# Patient Record
Sex: FEMALE | Race: BLACK OR AFRICAN AMERICAN | Employment: OTHER | URBAN - METROPOLITAN AREA
[De-identification: names, ages, dates, MRNs, and addresses within clinical notes are randomized per-mention and may not be internally consistent; named-entity substitution may affect disease eponyms.]

---

## 2019-04-19 ENCOUNTER — APPOINTMENT (OUTPATIENT)
Dept: GENERAL RADIOLOGY | Age: 69
DRG: 385 | End: 2019-04-19
Payer: MEDICARE

## 2019-04-19 ENCOUNTER — HOSPITAL ENCOUNTER (INPATIENT)
Age: 69
LOS: 6 days | Discharge: HOME | DRG: 385 | End: 2019-04-25
Attending: EMERGENCY MEDICINE | Admitting: FAMILY MEDICINE
Payer: MEDICARE

## 2019-04-19 ENCOUNTER — APPOINTMENT (OUTPATIENT)
Dept: CT IMAGING | Facility: CLINIC | Age: 69
DRG: 385 | End: 2019-04-19
Payer: MEDICARE

## 2019-04-19 DIAGNOSIS — R10.84 GENERALIZED ABDOMINAL PAIN: ICD-10-CM

## 2019-04-19 DIAGNOSIS — K56.609 SBO (SMALL BOWEL OBSTRUCTION) (HCC): Primary | ICD-10-CM

## 2019-04-19 LAB
-: ABNORMAL
-: NORMAL
ABSOLUTE EOS #: 0.4 K/UL (ref 0–0.4)
ABSOLUTE IMMATURE GRANULOCYTE: ABNORMAL K/UL (ref 0–0.3)
ABSOLUTE LYMPH #: 1.7 K/UL (ref 1–4.8)
ABSOLUTE MONO #: 0.5 K/UL (ref 0.1–1.2)
ALBUMIN SERPL-MCNC: 3.5 G/DL (ref 3.5–5.2)
ALBUMIN SERPL-MCNC: 3.9 G/DL (ref 3.5–5.2)
ALBUMIN/GLOBULIN RATIO: 0.9 (ref 1–2.5)
ALBUMIN/GLOBULIN RATIO: ABNORMAL (ref 1–2.5)
ALP BLD-CCNC: 150 U/L (ref 35–104)
ALP BLD-CCNC: 159 U/L (ref 35–104)
ALT SERPL-CCNC: 10 U/L (ref 5–33)
ALT SERPL-CCNC: 9 U/L (ref 5–33)
AMORPHOUS: ABNORMAL
ANION GAP SERPL CALCULATED.3IONS-SCNC: 11 MMOL/L (ref 9–17)
AST SERPL-CCNC: 13 U/L
AST SERPL-CCNC: 15 U/L
BACTERIA: ABNORMAL
BASOPHILS # BLD: 1 % (ref 0–2)
BASOPHILS ABSOLUTE: 0.1 K/UL (ref 0–0.2)
BILIRUB SERPL-MCNC: 0.2 MG/DL (ref 0.3–1.2)
BILIRUB SERPL-MCNC: 0.22 MG/DL (ref 0.3–1.2)
BILIRUBIN DIRECT: <0.08 MG/DL
BILIRUBIN URINE: NEGATIVE
BILIRUBIN, INDIRECT: ABNORMAL MG/DL (ref 0–1)
BUN BLDV-MCNC: 38 MG/DL (ref 8–23)
BUN/CREAT BLD: ABNORMAL (ref 9–20)
CALCIUM SERPL-MCNC: 10.1 MG/DL (ref 8.6–10.4)
CASTS UA: ABNORMAL /LPF (ref 0–2)
CHLORIDE BLD-SCNC: 103 MMOL/L (ref 98–107)
CO2: 21 MMOL/L (ref 20–31)
COLOR: YELLOW
COMMENT UA: ABNORMAL
CREAT SERPL-MCNC: 2.1 MG/DL (ref 0.5–0.9)
CRYSTALS, UA: ABNORMAL /HPF
DIFFERENTIAL TYPE: ABNORMAL
EOSINOPHILS RELATIVE PERCENT: 6 % (ref 1–4)
EPITHELIAL CELLS UA: ABNORMAL /HPF (ref 0–5)
GFR AFRICAN AMERICAN: 28 ML/MIN
GFR NON-AFRICAN AMERICAN: 23 ML/MIN
GFR SERPL CREATININE-BSD FRML MDRD: ABNORMAL ML/MIN/{1.73_M2}
GFR SERPL CREATININE-BSD FRML MDRD: ABNORMAL ML/MIN/{1.73_M2}
GLOBULIN: ABNORMAL G/DL (ref 1.5–3.8)
GLUCOSE BLD-MCNC: 172 MG/DL (ref 70–99)
GLUCOSE URINE: NEGATIVE
HCT VFR BLD CALC: 35.4 % (ref 36–46)
HEMOGLOBIN: 11 G/DL (ref 12–16)
IMMATURE GRANULOCYTES: ABNORMAL %
KETONES, URINE: NEGATIVE
LACTIC ACID: 0.6 MMOL/L (ref 0.5–2.2)
LEUKOCYTE ESTERASE, URINE: ABNORMAL
LIPASE: 9 U/L (ref 13–60)
LYMPHOCYTES # BLD: 26 % (ref 24–44)
MCH RBC QN AUTO: 22.5 PG (ref 26–34)
MCHC RBC AUTO-ENTMCNC: 31 G/DL (ref 31–37)
MCV RBC AUTO: 72.5 FL (ref 80–100)
MONOCYTES # BLD: 8 % (ref 2–11)
MUCUS: ABNORMAL
MYOGLOBIN: 68 NG/ML (ref 25–58)
NITRITE, URINE: NEGATIVE
NRBC AUTOMATED: ABNORMAL PER 100 WBC
OTHER OBSERVATIONS UA: ABNORMAL
PDW BLD-RTO: 15.7 % (ref 12.5–15.4)
PH UA: 5 (ref 5–8)
PLATELET # BLD: 182 K/UL (ref 140–450)
PLATELET ESTIMATE: ABNORMAL
PMV BLD AUTO: 10.5 FL (ref 6–12)
POTASSIUM SERPL-SCNC: 4.9 MMOL/L (ref 3.7–5.3)
PROTEIN UA: ABNORMAL
RBC # BLD: 4.88 M/UL (ref 4–5.2)
RBC # BLD: ABNORMAL 10*6/UL
RBC UA: ABNORMAL /HPF (ref 0–2)
REASON FOR REJECTION: NORMAL
RENAL EPITHELIAL, UA: ABNORMAL /HPF
SEG NEUTROPHILS: 59 % (ref 36–66)
SEGMENTED NEUTROPHILS ABSOLUTE COUNT: 3.9 K/UL (ref 1.8–7.7)
SODIUM BLD-SCNC: 135 MMOL/L (ref 135–144)
SPECIFIC GRAVITY UA: 1.02 (ref 1–1.03)
TOTAL PROTEIN: 7.7 G/DL (ref 6.4–8.3)
TOTAL PROTEIN: 8.1 G/DL (ref 6.4–8.3)
TRICHOMONAS: ABNORMAL
TROPONIN INTERP: ABNORMAL
TROPONIN T: ABNORMAL NG/ML
TROPONIN, HIGH SENSITIVITY: 10 NG/L (ref 0–14)
TURBIDITY: CLEAR
URINE HGB: NEGATIVE
UROBILINOGEN, URINE: NORMAL
WBC # BLD: 6.6 K/UL (ref 3.5–11)
WBC # BLD: ABNORMAL 10*3/UL
WBC UA: ABNORMAL /HPF (ref 0–5)
YEAST: ABNORMAL
ZZ NTE CLEAN UP: ORDERED TEST: NORMAL
ZZ NTE WITH NAME CLEAN UP: SPECIMEN SOURCE: NORMAL

## 2019-04-19 PROCEDURE — 36415 COLL VENOUS BLD VENIPUNCTURE: CPT

## 2019-04-19 PROCEDURE — 80053 COMPREHEN METABOLIC PANEL: CPT

## 2019-04-19 PROCEDURE — 83874 ASSAY OF MYOGLOBIN: CPT

## 2019-04-19 PROCEDURE — 6360000002 HC RX W HCPCS: Performed by: EMERGENCY MEDICINE

## 2019-04-19 PROCEDURE — 83690 ASSAY OF LIPASE: CPT

## 2019-04-19 PROCEDURE — 96374 THER/PROPH/DIAG INJ IV PUSH: CPT

## 2019-04-19 PROCEDURE — 74176 CT ABD & PELVIS W/O CONTRAST: CPT

## 2019-04-19 PROCEDURE — 93005 ELECTROCARDIOGRAM TRACING: CPT

## 2019-04-19 PROCEDURE — 1200000000 HC SEMI PRIVATE

## 2019-04-19 PROCEDURE — 99285 EMERGENCY DEPT VISIT HI MDM: CPT

## 2019-04-19 PROCEDURE — 83605 ASSAY OF LACTIC ACID: CPT

## 2019-04-19 PROCEDURE — 74018 RADEX ABDOMEN 1 VIEW: CPT

## 2019-04-19 PROCEDURE — 2580000003 HC RX 258: Performed by: FAMILY MEDICINE

## 2019-04-19 PROCEDURE — 81001 URINALYSIS AUTO W/SCOPE: CPT

## 2019-04-19 PROCEDURE — 96375 TX/PRO/DX INJ NEW DRUG ADDON: CPT

## 2019-04-19 PROCEDURE — 84484 ASSAY OF TROPONIN QUANT: CPT

## 2019-04-19 PROCEDURE — 85025 COMPLETE CBC W/AUTO DIFF WBC: CPT

## 2019-04-19 PROCEDURE — 80076 HEPATIC FUNCTION PANEL: CPT

## 2019-04-19 PROCEDURE — 6360000002 HC RX W HCPCS: Performed by: FAMILY MEDICINE

## 2019-04-19 PROCEDURE — 96376 TX/PRO/DX INJ SAME DRUG ADON: CPT

## 2019-04-19 RX ORDER — FENTANYL CITRATE 50 UG/ML
25 INJECTION, SOLUTION INTRAMUSCULAR; INTRAVENOUS ONCE
Status: COMPLETED | OUTPATIENT
Start: 2019-04-19 | End: 2019-04-19

## 2019-04-19 RX ORDER — HEPARIN SODIUM 5000 [USP'U]/ML
5000 INJECTION, SOLUTION INTRAVENOUS; SUBCUTANEOUS 2 TIMES DAILY
Status: DISCONTINUED | OUTPATIENT
Start: 2019-04-20 | End: 2019-04-26 | Stop reason: HOSPADM

## 2019-04-19 RX ORDER — TACROLIMUS 1 MG/1
2 CAPSULE ORAL 2 TIMES DAILY
Status: ON HOLD | COMMUNITY
End: 2019-04-25 | Stop reason: HOSPADM

## 2019-04-19 RX ORDER — CONJUGATED ESTROGENS 0.62 MG/G
0.5 CREAM VAGINAL PRN
COMMUNITY

## 2019-04-19 RX ORDER — DOCUSATE SODIUM 100 MG/1
100 CAPSULE, LIQUID FILLED ORAL PRN
Status: ON HOLD | COMMUNITY
End: 2019-08-01 | Stop reason: ALTCHOICE

## 2019-04-19 RX ORDER — ONDANSETRON 2 MG/ML
4 INJECTION INTRAMUSCULAR; INTRAVENOUS ONCE
Status: DISCONTINUED | OUTPATIENT
Start: 2019-04-19 | End: 2019-04-26 | Stop reason: HOSPADM

## 2019-04-19 RX ORDER — NICOTINE 21 MG/24HR
1 PATCH, TRANSDERMAL 24 HOURS TRANSDERMAL DAILY PRN
Status: DISCONTINUED | OUTPATIENT
Start: 2019-04-19 | End: 2019-04-19

## 2019-04-19 RX ORDER — ACETAMINOPHEN 500 MG
1000 TABLET ORAL NIGHTLY PRN
Status: ON HOLD | COMMUNITY
End: 2019-04-25 | Stop reason: HOSPADM

## 2019-04-19 RX ORDER — ASPIRIN 81 MG/1
81 TABLET, CHEWABLE ORAL DAILY
Status: ON HOLD | COMMUNITY
End: 2019-04-25 | Stop reason: HOSPADM

## 2019-04-19 RX ORDER — ONDANSETRON 2 MG/ML
4 INJECTION INTRAMUSCULAR; INTRAVENOUS ONCE
Status: COMPLETED | OUTPATIENT
Start: 2019-04-19 | End: 2019-04-19

## 2019-04-19 RX ORDER — MAGNESIUM SULFATE 1 G/100ML
1 INJECTION INTRAVENOUS PRN
Status: DISCONTINUED | OUTPATIENT
Start: 2019-04-19 | End: 2019-04-19

## 2019-04-19 RX ORDER — SODIUM CHLORIDE 0.9 % (FLUSH) 0.9 %
10 SYRINGE (ML) INJECTION PRN
Status: DISCONTINUED | OUTPATIENT
Start: 2019-04-19 | End: 2019-04-26 | Stop reason: HOSPADM

## 2019-04-19 RX ORDER — SODIUM CHLORIDE 0.9 % (FLUSH) 0.9 %
10 SYRINGE (ML) INJECTION EVERY 12 HOURS SCHEDULED
Status: DISCONTINUED | OUTPATIENT
Start: 2019-04-19 | End: 2019-04-26 | Stop reason: HOSPADM

## 2019-04-19 RX ORDER — ONDANSETRON 2 MG/ML
4 INJECTION INTRAMUSCULAR; INTRAVENOUS EVERY 6 HOURS PRN
Status: DISCONTINUED | OUTPATIENT
Start: 2019-04-19 | End: 2019-04-19

## 2019-04-19 RX ORDER — METOPROLOL SUCCINATE 25 MG/1
25 TABLET, EXTENDED RELEASE ORAL NIGHTLY
COMMUNITY
Start: 2018-08-30

## 2019-04-19 RX ORDER — URSODIOL 300 MG/1
600 CAPSULE ORAL 2 TIMES DAILY
COMMUNITY

## 2019-04-19 RX ORDER — ACETAMINOPHEN 325 MG/1
650 TABLET ORAL EVERY 4 HOURS PRN
Status: DISCONTINUED | OUTPATIENT
Start: 2019-04-19 | End: 2019-04-26 | Stop reason: HOSPADM

## 2019-04-19 RX ORDER — TACROLIMUS 1 MG/1
1 CAPSULE ORAL 2 TIMES DAILY
Status: DISCONTINUED | OUTPATIENT
Start: 2019-04-19 | End: 2019-04-22

## 2019-04-19 RX ORDER — SODIUM CHLORIDE 9 MG/ML
INJECTION, SOLUTION INTRAVENOUS CONTINUOUS
Status: DISCONTINUED | OUTPATIENT
Start: 2019-04-19 | End: 2019-04-21

## 2019-04-19 RX ORDER — ONDANSETRON 4 MG/1
4 TABLET, ORALLY DISINTEGRATING ORAL EVERY 6 HOURS PRN
Status: DISCONTINUED | OUTPATIENT
Start: 2019-04-19 | End: 2019-04-26 | Stop reason: HOSPADM

## 2019-04-19 RX ORDER — FERROUS SULFATE 325(65) MG
325 TABLET ORAL 2 TIMES DAILY
Status: ON HOLD | COMMUNITY
End: 2019-08-01 | Stop reason: ALTCHOICE

## 2019-04-19 RX ORDER — ONDANSETRON 2 MG/ML
4 INJECTION INTRAMUSCULAR; INTRAVENOUS EVERY 6 HOURS PRN
Status: DISCONTINUED | OUTPATIENT
Start: 2019-04-19 | End: 2019-04-26 | Stop reason: HOSPADM

## 2019-04-19 RX ADMIN — FENTANYL CITRATE 25 MCG: 50 INJECTION, SOLUTION INTRAMUSCULAR; INTRAVENOUS at 08:36

## 2019-04-19 RX ADMIN — ONDANSETRON 4 MG: 2 INJECTION INTRAMUSCULAR; INTRAVENOUS at 06:36

## 2019-04-19 RX ADMIN — SODIUM CHLORIDE: 9 INJECTION, SOLUTION INTRAVENOUS at 19:19

## 2019-04-19 RX ADMIN — FENTANYL CITRATE 25 MCG: 50 INJECTION, SOLUTION INTRAMUSCULAR; INTRAVENOUS at 06:36

## 2019-04-19 RX ADMIN — FENTANYL CITRATE 25 MCG: 50 INJECTION, SOLUTION INTRAMUSCULAR; INTRAVENOUS at 07:19

## 2019-04-19 RX ADMIN — TACROLIMUS 1 MG: 1 CAPSULE ORAL at 20:12

## 2019-04-19 RX ADMIN — SODIUM CHLORIDE: 9 INJECTION, SOLUTION INTRAVENOUS at 12:26

## 2019-04-19 RX ADMIN — HYDROMORPHONE HYDROCHLORIDE 0.5 MG: 1 INJECTION, SOLUTION INTRAMUSCULAR; INTRAVENOUS; SUBCUTANEOUS at 12:26

## 2019-04-19 RX ADMIN — HYDROMORPHONE HYDROCHLORIDE 0.5 MG: 1 INJECTION, SOLUTION INTRAMUSCULAR; INTRAVENOUS; SUBCUTANEOUS at 17:54

## 2019-04-19 RX ADMIN — ONDANSETRON 4 MG: 2 INJECTION INTRAMUSCULAR; INTRAVENOUS at 15:57

## 2019-04-19 SDOH — HEALTH STABILITY: MENTAL HEALTH: HOW OFTEN DO YOU HAVE A DRINK CONTAINING ALCOHOL?: NEVER

## 2019-04-19 ASSESSMENT — PAIN DESCRIPTION - PAIN TYPE
TYPE: ACUTE PAIN

## 2019-04-19 ASSESSMENT — PAIN DESCRIPTION - FREQUENCY
FREQUENCY: INTERMITTENT
FREQUENCY: INTERMITTENT
FREQUENCY: CONTINUOUS
FREQUENCY: CONTINUOUS

## 2019-04-19 ASSESSMENT — PAIN SCALES - GENERAL
PAINLEVEL_OUTOF10: 10
PAINLEVEL_OUTOF10: 6
PAINLEVEL_OUTOF10: 10
PAINLEVEL_OUTOF10: 6
PAINLEVEL_OUTOF10: 8
PAINLEVEL_OUTOF10: 8
PAINLEVEL_OUTOF10: 3
PAINLEVEL_OUTOF10: 8
PAINLEVEL_OUTOF10: 10

## 2019-04-19 ASSESSMENT — PAIN DESCRIPTION - PROGRESSION
CLINICAL_PROGRESSION: NOT CHANGED
CLINICAL_PROGRESSION: RAPIDLY IMPROVING
CLINICAL_PROGRESSION: GRADUALLY IMPROVING
CLINICAL_PROGRESSION: NOT CHANGED
CLINICAL_PROGRESSION: NOT CHANGED

## 2019-04-19 ASSESSMENT — PAIN DESCRIPTION - ORIENTATION
ORIENTATION: LEFT;LOWER

## 2019-04-19 ASSESSMENT — PAIN - FUNCTIONAL ASSESSMENT
PAIN_FUNCTIONAL_ASSESSMENT: ACTIVITIES ARE NOT PREVENTED

## 2019-04-19 ASSESSMENT — PAIN DESCRIPTION - ONSET
ONSET: ON-GOING

## 2019-04-19 ASSESSMENT — PAIN DESCRIPTION - LOCATION
LOCATION: ABDOMEN

## 2019-04-19 ASSESSMENT — PAIN DESCRIPTION - DESCRIPTORS
DESCRIPTORS: CRAMPING
DESCRIPTORS: TIGHTNESS;CRAMPING
DESCRIPTORS: CRAMPING
DESCRIPTORS: CRAMPING;TIGHTNESS

## 2019-04-19 NOTE — PROGRESS NOTES
Pt admitted to room 2011 from [address] . Oriented to room, call light and bed mechanics. Side rails up x2. Call light within reach. Dr. Andres Blake to be contacted for further orders.

## 2019-04-19 NOTE — CARE COORDINATION
Case Management Initial Discharge Plan  Mercedes [place],         Readmission Risk              Risk of Unplanned Readmission:        12             Met with:patient to discuss discharge plans. Information verified: address, contacts, phone number, , insurance Yes  PCP: Luis Dan MD  Date of last visit: 2019    Insurance Provider: Medicare/ JOURDAN    Discharge Planning  Current Residence:  Private home  Living Arrangements:  Spouse/Significant Other, Family Members(three grandchildren )   Home has 2 stories/bedroom and bathroom on second floor and half bath on first floor. 1 outside step  Support Systems:  Family Members  Current Services PTA:  none Agency: none  Patient able to perform ADL's:Independent  DME in home:  Benja Vladislav, cane  DME used to aid ambulation prior to admission:   Occasional use of cane  DME used during admission:  TBD    Potential Assistance Needed:  N/A    Pharmacy: [facility] on [address] Medications:  No  Does patient want to participate in local refill/ meds to beds program?  No    Patient agreeable to home care: No  [place] of choice provided:  n/a      Type of Home Care Services:  None  Patient expects to be discharged to:  home     Prior SNF/Rehab Placement and Facility: none  Agreeable to SNF/Rehab: No  [place] of choice provided: n/a   Evaluation: n/a    Expected Discharge date:  19  Follow Up Appointment: Best Day/ Time: Monday AM    Transportation provider:   Transportation arrangements needed for discharge: No    Discharge Plan:   Met with patient to review plan of care. Pt lives with her  and 3 grand children ages 10, 6 and 15; they grandparents have custody of the grandchildren. Pt has history of ulcerative colitis and her GI DrDeann Retired and she needs recommendation for a new GI and would appreciate help setting up an appt. Pt hopes to go home with no services.     Plan  F/U with surgical consult and

## 2019-04-19 NOTE — H&P
Matty Chen MD, [facility]  History and Physical    Patient:  Carol Tran  MRN: [id number]    CHIEF COMPLAINT:  ' I had severe pain in my lower abdomen '    History Obtained From:  patient, non-family caregiver - ER physician, nursing, electronic medical record  PCP: Nazario Reza MD    HISTORY OF PRESENT ILLNESS:   The patient is a 77 y.o. female who presents with progressive lower abdominal pain for the last day. Pt says became severe and unbearable for her. Pt says pain was initially cramping but later became severe. C/o vomiting since last night about 5 to 6 times. C/o nausea that has been ongoing    Pt says she has had diarrhea for the last 3 weeks or so. Says was having 3-4 BMs most days. She was havig small amounts of BMs each time. There was cramping associated w that in addition. Pt says in addition she had fresh blood at times. Pt says she had been started on iron in the past and noted her BMs to be dark and black. Pt has undergone transplant of her liver. 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 Says she needed a liver transplant again in 2011. CA liver was found at that time and she underwent chemo for 8 months after that. Pt denies any fever, rash, joint swelling, orthopnea or dyspnea recently    Other ROS neg      Past Medical History:        Diagnosis Date    Liver transplant recipient [facility]) 2004 and 2010    x2     [facility]     Tachycardia     Ulcerative colitis ([facility]        Past Surgical History:        Procedure Laterality Date    CHOLECYSTECTOMY  1987    HERNIA REPAIR      Hiatal and Incisional     LIVER TRANSPLANT  2004,2010    x2    TOTAL KNEE ARTHROPLASTY Left 2018       Medications Prior to Admission:    Prior to Admission medications    Medication Sig Start Date End Date Taking?  Authorizing Provider   aspirin 81 MG chewable tablet Take 81 mg by mouth daily   Yes Historical Provider, MD   docusate sodium (COLACE) 100 MG capsule Take 100 mg by mouth as needed   Yes Historical Provider, MD   ursodiol (ACTIGALL) 300 MG capsule Take 600 mg by mouth 2 times daily   Yes Historical Provider, MD   metoprolol succinate (TOPROL XL) 25 MG extended release tablet Take 25 mg by mouth daily 8/30/18  Yes Historical Provider, MD   tacrolimus (PROGRAF) 1 MG capsule Take 2 mg by mouth 2 times daily    Yes Historical Provider, MD   conjugated estrogens (PREMARIN) 0.625 MG/GM vaginal cream Place 0.5 g vaginally as needed   Yes Historical Provider, MD   acetaminophen (TYLENOL) 500 MG tablet Take 500 mg by mouth as needed   Yes Historical Provider, MD   Triprolidine-Pseudoephedrine (ACTANOL PO) Take 600 mg by mouth 2 times daily   Yes Historical Provider, MD   Cholecalciferol-Vitamin C (VITAMIN D3-VITAMIN C) 1000-500 UNIT-MG CAPS Take 1 tablet by mouth 2 times daily   Yes Historical Provider, MD   ferrous sulfate 325 (65 Fe) MG tablet Take 325 mg by mouth daily (with breakfast)   Yes Historical Provider, MD   Multiple Vitamins-Iron (ONE DAILY MULTIVITAMIN/IRON PO) Take 1 tablet by mouth daily   Yes Historical Provider, MD       Allergies:  Codeine; Compazine [prochlorperazine]; Morphine; and Moxifloxacin    Social History:   TOBACCO:   reports that she has never smoked. She has never used smokeless tobacco.  ETOH:   reports that she does not drink alcohol. OCCUPATION:  Retired from [address] [place] [address] [employer] History:   History reviewed. No pertinent family history. REVIEW OF SYSTEMS:  Negative except for as above. Physical Exam:    Vitals: /86   Pulse 91   Temp 97.3 °F (36.3 °C) (Oral)   Resp 16   Ht 5' 7\" (1.702 m)   Wt 183 lb (83 kg)   SpO2 98%   BMI 28.66 kg/m²   General appearance: alert, appears stated age and cooperative. NG tube  Skin: Skin color, texture, turgor normal. No rashes or lesions  HEENT: Head: Normocephalic, no lesions, without obvious abnormality.   Neck: no adenopathy, no carotid bruit, no JVD, supple, symmetrical, trachea midline and thyroid not tube tip and side hole terminate in the body of the stomach. CT ABDOMEN PELVIS WO CONTRAST [[id number]] Collected: 04/19/19 0656   Updated: 04/19/19 1025    Narrative:     EXAMINATION:  CT OF THE ABDOMEN AND PELVIS WITHOUT CONTRAST 4/19/2019 6:38 am    TECHNIQUE:  CT of the abdomen and pelvis was performed without the administration of  intravenous contrast. Multiplanar reformatted images are provided for review. Dose modulation, iterative reconstruction, and/or weight based adjustment of  the mA/kV was utilized to reduce the radiation dose to as low as reasonably  achievable. COMPARISON:  None    HISTORY:  ORDERING SYSTEM PROVIDED HISTORY: ABDOMINAL PAIN  TECHNOLOGIST PROVIDED HISTORY:  Ordering Physician Provided Reason for Exam: Atypical Epigastric pain this  early morning  Acuity: Acute  Type of Exam: Initial    FINDINGS:  Lower Chest: Dependent lung changes. Organs: Evaluation of the solid organs is limited without intravenous  contrast. Postsurgical changes within the upper abdomen from history of liver  transplant.  No liver lesion.  Mild pneumobilia.  Cholecystectomy. Pancreatic atrophy.  No definite pancreatic lesion.  No splenomegaly.  No  adrenal lesion.  No hydronephrosis.  Bilateral renal atrophy.  No renal  calculus.  Multiple left renal cysts. GI/Bowel: Small bowel anastomosis.  Mildly dilated loop of small bowel  proximal to the anastomosis.  No acute inflammatory process. Pelvis: No free fluid.  No bladder calculus. Peritoneum/Retroperitoneum: Atherosclerotic calcification of the abdominal  aorta without aneurysmal dilatation.  No adenopathy. Bones/Soft Tissues: Postsurgical changes along the lower anterior abdominal  wall.  No acute abnormality.  Diffuse osteopenia.  Lumbar spine degenerative  changes. Impression:     Mildly dilated loop of small bowel proximal to the anastomosis.  Findings may  be on the basis of delayed transit versus mild partial small bowel  obstruction.  No high-grade obstruction. Microscopic Urinalysis [[id number]] (Abnormal) Collected: 04/19/19 0739   Updated: 04/19/19 0804     -         WBC, UA 2 TO 5 /HPF    RBC, UA 0 TO 2 /HPF    Casts UA NOT REPORTED /LPF    Crystals UA NOT REPORTED /HPF    Epithelial Cells UA 10 TO 20 /HPF    Renal Epithelial, Urine NOT REPORTED /HPF    Bacteria, UA FEWAbnormal     Mucus, UA NOT REPORTED    Trichomonas, UA NOT REPORTED    Amorphous, UA NOT REPORTED    Other Observations UA NOT REPORTED    Yeast, UA NOT REPORTED   Urinalysis [[id number]] (Abnormal) Collected: 04/19/19 0739   Updated: 04/19/19 0804    Specimen Type: Urine (20)     Color, UA YELLOW    Turbidity UA CLEAR    Glucose, Ur NEGATIVE    Bilirubin Urine NEGATIVE    Ketones, Urine NEGATIVE    Specific [place], UA 1.025    Urine Hgb NEGATIVE    pH, UA 5.0    Protein, UA 1+Abnormal     Urobilinogen, Urine Normal    Nitrite, Urine NEGATIVE    Leukocyte Esterase, Urine TRACEAbnormal     Urinalysis Comments NOT REPORTED   Comprehensive Metabolic Panel [[id number]] (Abnormal) Collected: 04/19/19 0710   Updated: 04/19/19 0731     Glucose 172High  mg/dL    BUN 38High  mg/dL    CREATININE 2. 10High  mg/dL    Bun/Cre Ratio NOT REPORTED    Calcium 10.1 mg/dL    Sodium 135 mmol/L    Potassium 4.9 mmol/L    Chloride 103 mmol/L    CO2 21 mmol/L    Anion Gap 11 mmol/L    Alkaline Phosphatase 159High  U/L    ALT 9 U/L    AST 13 U/L    Total Bilirubin 0.20Low  mg/dL    Total Protein 8.1 g/dL    Alb 3.9 g/dL    Albumin/Globulin Ratio 0.9Low     GFR Non- 23Low  mL/min    GFR  28Low  mL/min    GFR Comment         Comment: Average GFR for 79or more years old:    65 mL/min/1.73sq m   Chronic Kidney Disease:    <60 mL/min/1.73sq m   Kidney failure:    <15 mL/min/1.73sq m               eGFR calculated using average adult body mass.  Additional eGFR calculator available at:         [email or web address]              GFR Staging NOT REPORTED   Lipase [[id number]] (Abnormal) Collected: 04/19/19 0710   Updated: 04/19/19 0731     Lipase 9Low  U/L   PREVIOUS SPECIMEN [[id number]] Collected: 04/19/19 0710   Updated: 04/19/19 0715    SPECIMEN REJECTION [[id number]] Collected: 04/19/19 0521   Updated: 04/19/19 0708     Specimen Source . BLOOD    Ordered Test CP LIP    Reason for Rejection Unable to perform testing: Specimen hemolyzed. - NOT REPORTED   TROP/MYOGLOBIN [[id number]] (Abnormal) Collected: 04/19/19 0621   Updated: 04/19/19 5920    Specimen Source: Blood     Troponin, High Sensitivity 10 ng/L    Comment:        High Sensitivity Troponin values cannot be compared with other Troponin methodologies.         Patients with high levels of Biotin oral intake (i.e >5mg/day) may have falsely decreased   Troponin levels. Samples collected within 8 hours of biotin intake may require additional   information for diagnosis. Troponin T NOT REPORTED ng/mL    Troponin Interp NOT REPORTED    Myoglobin 68High  ng/mL   CBC Auto Differential [[id number]] (Abnormal) Collected: 04/19/19 0621   Updated: 04/19/19 0645    Specimen Source: Blood     WBC 6.6 k/uL    RBC 4.88 m/uL    Hemoglobin 11.0Low  g/dL    Hematocrit 35.4Low  %    MCV 72.5Low  fL    MCH 22.5Low  pg    MCHC 31.0 g/dL    RDW 15.7High  %    Platelets 909 k/uL         Assessment and Plan   1. Ac SBO  2. Prior multiple abd surgeries  3. Likely adhesions  4. NG  5. NPO  6. IV fluids  7. Is/ Os  8. ?Ischemic colitis w recent diarrhea, hematochezia and now obstruction  9. Check Lactic acid  10. If has BM, will check FOBT, WBCs, Stool Cx  11. PPI   12. Surgery consulted already  13. H/o Liver transplant x2  14. On Tacrolimus only  15. Consulted pharmacy for IV dosing  [facility]. [facility]  17. Transplant dept recommends SQ half dose and recheck levels  18. Incentive Spirometry  19. HTN  20. Pain control  21. Dilaudid prn  22. Anti emetics prn  23. Heparin  24. CKD3  25.  Consult Nephrology also  26. ?GI consult  27.  Cont close monitoring    Patient Active Problem List   Diagnosis Code    [facility] K56.609       Electronically signed by Natacha Sanford MD on 4/19/2019 at 2:47 PM

## 2019-04-19 NOTE — CONSULTS
General Surgery Consult      Pt Name: Jennifer Means  MRN: [id number]  [place]: 1950  Date of evaluation: 4/19/2019  Primary Care Physician: Camryn Houston MD   Patient evaluated at the request of  Dr. Jen Randall  Reason for evaluation: Abdominal pain. Possible small bowel obstruction    SUBJECTIVE:   History of Chief Complaint:    Jennifer Means is a 77 y.o. female who presents with left-sided abdominal pain for the past couple days. The patient has a long-standing history of ulcerative colitis. She was followed by Dr. Cris Garcia and had a colonoscopy about 2 years ago. Her history is also significant for liver transplant back in 2004 for ascending cholangitis. The transplanted liver was found to have hepatocellular carcinoma for which the patient underwent chemotherapy and had subsequent re-transplant back in 2010. The patient has had multiple hernia surgeries. He denies having colon surgery. She complains of pain but denies nausea or vomiting    Past Medical History   has a past medical history of Liver transplant recipient [facility]), Sickle cell trait ([facility] Tachycardia, and Ulcerative colitis ([facility] Past Surgical History   has a past surgical history that includes Liver transplant ([id number]); hernia repair; Cholecystectomy (1987); and Total knee arthroplasty (Left, 2018). Medications  Prior to Admission medications    Medication Sig Start Date End Date Taking?  Authorizing Provider   aspirin 81 MG chewable tablet Take 81 mg by mouth daily   Yes Historical Provider, MD   docusate sodium (COLACE) 100 MG capsule Take 100 mg by mouth as needed   Yes Historical Provider, MD   ursodiol (ACTIGALL) 300 MG capsule Take 600 mg by mouth 2 times daily   Yes Historical Provider, MD   metoprolol succinate (TOPROL XL) 25 MG extended release tablet Take 25 mg by mouth daily 8/30/18  Yes Historical Provider, MD   tacrolimus (PROGRAF) 1 MG capsule Take 2 mg by mouth 2 times daily    Yes Historical Provider, MD inguinal nodes  HEENT: Head is normocephalic, atraumatic. EOMI, PERRLA  NECK: Supple, symmetrical, trachea midline, no adenopathy, thyroid symmetric, not enlarged and no tenderness, skin normal  CHEST/LUNGS: chest symmetric with normal A/P diameter, normal respiratory rate and rhythm, lungs clear to auscultation without wheezes, rales or rhonchi. No accessory muscle use. CARDIOVASCULAR: Heart regular rate and rhythm   ABDOMEN: Normal shape. Normal bowel sounds. No bruits. Soft, nondistended, no masses or organomegaly. no evidence of hernia. Percussion: Normal without hepatosplenomegally. Tenderness: Left abdomen, without rebound, rigidity, or guarding  RECTAL: Deferred at this time  NEUROLOGIC: There are no focalizing motor or sensory deficits. CN II-XII are grossly intact.   EXTREMITIES: no cyanosis, no clubbing and no edema    LABS:     CBC with Differential:    Lab Results   Component Value Date    WBC 6.6 04/19/2019    RBC 4.88 04/19/2019    HGB 11.0 04/19/2019    HCT 35.4 04/19/2019     04/19/2019    MCV 72.5 04/19/2019    MCH 22.5 04/19/2019    MCHC 31.0 04/19/2019    RDW 15.7 04/19/2019    LYMPHOPCT 26 04/19/2019    MONOPCT 8 04/19/2019    BASOPCT 1 04/19/2019    MONOSABS 0.50 04/19/2019    LYMPHSABS 1.70 04/19/2019    EOSABS 0.40 04/19/2019    BASOSABS 0.10 04/19/2019    DIFFTYPE NOT REPORTED 04/19/2019     CMP:    Lab Results   Component Value Date     04/19/2019    K 4.9 04/19/2019     04/19/2019    CO2 21 04/19/2019    BUN 38 04/19/2019    CREATININE 2.10 04/19/2019    GFRAA 28 04/19/2019    LABGLOM 23 04/19/2019    GLUCOSE 172 04/19/2019    PROT 7.7 04/19/2019    LABALBU 3.5 04/19/2019    CALCIUM 10.1 04/19/2019    BILITOT 0.22 04/19/2019    ALKPHOS 150 04/19/2019    AST 15 04/19/2019    ALT 10 04/19/2019     Magnesium:  No results found for: MG  Phosphorus:  No results found for: PHOS  PT/INR:  No results found for: PROTIME, INR  U/A:    Lab Results   Component Value Date COLORU YELLOW 04/19/2019    PROTEINU 1+ 04/19/2019    PHUR 5.0 04/19/2019    WBCUA 2 TO 5 04/19/2019    RBCUA 0 TO 2 04/19/2019    MUCUS NOT REPORTED 04/19/2019    TRICHOMONAS NOT REPORTED 04/19/2019    YEAST NOT REPORTED 04/19/2019    BACTERIA FEW 04/19/2019    SPECGRAV 1.025 04/19/2019    LEUKOCYTESUR TRACE 04/19/2019    UROBILINOGEN Normal 04/19/2019    BILIRUBINUR NEGATIVE 04/19/2019    GLUCOSEU NEGATIVE 04/19/2019    AMORPHOUS NOT REPORTED 04/19/2019     LIPASE:    Lab Results   Component Value Date    LIPASE 9 04/19/2019         RADIOLOGY:   I have personally reviewed the following films:  CT scan abd/pelvis:   Lower Chest: Dependent lung changes. Organs: Evaluation of the solid organs is limited without intravenous  contrast. Postsurgical changes within the upper abdomen from history of liver  transplant. No liver lesion. Mild pneumobilia. Cholecystectomy. Pancreatic atrophy. No definite pancreatic lesion. No splenomegaly. No  adrenal lesion. No hydronephrosis. Bilateral renal atrophy. No renal  calculus. Multiple left renal cysts. GI/Bowel: Small bowel anastomosis. Mildly dilated loop of small bowel  proximal to the anastomosis. No acute inflammatory process. Pelvis: No free fluid. No bladder calculus. Peritoneum/Retroperitoneum: Atherosclerotic calcification of the abdominal  aorta without aneurysmal dilatation. No adenopathy. Bones/Soft Tissues: Postsurgical changes along the lower anterior abdominal  wall. No acute abnormality. Diffuse osteopenia. Lumbar spine degenerative  changes. Impression:      Mildly dilated loop of small bowel proximal to the anastomosis. Findings may  be on the basis of delayed transit versus mild partial small bowel  obstruction. No high-grade obstruction. IMPRESSION:   1. Possible partial small bowel obstruction versus delayed emptying. 2. Doubt complete small bowel obstruction  3.  Patient's pain appears to be related more to flareup of ulcerative colitis    Principal Problem:    SBO (small bowel obstruction) ([facility])  Resolved Problems:    * No resolved hospital problems. *      PLAN:   1. Will follow closely  2. Consult GI regarding ulcerative colitis. The patient will need follow-up since her gastroenterologist has retired  3. Check a.m. KUB  4. Should the patient require surgical intervention I would recommend transfer to [facility] where the patient had her transplant as she would be a very high risk surgical candidate  5. Above was discussed at length with the patient  6. Further recommendations to follow      Thank you for this interesting consult and for allowing us to participate in the care of your patient. Please feel free to contact me with any questions or concerns.

## 2019-04-19 NOTE — PROGRESS NOTES
Call was placed to [facility] ([phone]) to discuss with patients transplant nurse/pharmacist regarding their recommendations for IV tacrolimus due to patient being NPO. Per Vivian Doty , Pharm D, transplantation pharmacist at [facility], they try to avoid IV tacrolimus due to an increased risk of Nephrotoxicity. There is data to support the use of SUBLINGUAL tacrolimus in this situation. Her recommendation was to give 50 % of patients usual PO dose. Patients most recent PO dose was Prograf 2 MG PO BID. Therefore, we will order PROGRAF 1 MG SUBLINGUAL BID. Further recommendations include that the nurses be protected during administration by using gown, mask and double gloves to reduced their exposure to the powder 3Er \[facility] [place] [facility]\""o [facility]. The powder should be administered under the tongue, the patient should allow at least 10  minutes for absorption by this route. Also recommended was a repeat tacrolimus () trough level with our goal being 8 ug/ml. Aron Moseley recommeds this level on Sunday if patient is still using the Prograf sublingual at that point. We will order this and follow patient with you.

## 2019-04-19 NOTE — FLOWSHEET NOTE
Patient sleeping and no family present. Writer left note and offered silent prayer. Patient did not respond.        04/19/19 1216   Encounter Summary   Services provided to: Patient   Referral/Consult From: Nasrin Yusuf Visiting   (4/19/19 Pt Sleeping)   Complexity of Encounter Low   Length of Encounter 15 minutes   Routine   Type Initial   Assessment Sleeping   Intervention Prayer;Sustaining presence/ Ministry of presence   Outcome Did not respond

## 2019-04-19 NOTE — ED NOTES
Pt states she began to experience left lower abdominal pain described as cramping since yesterday.  Pt also c/o nausea and diarrhea attributed to her ulcerative colitis       Jammie Cuellar RN  04/19/19 [address] Kolleen Brittle RN  04/19/19 1997

## 2019-04-19 NOTE — ED NOTES
Dr. Field Better returns page and speaks with dr Olivia Little regarding admission to [address], RN  04/19/19 3359

## 2019-04-19 NOTE — ED NOTES
Dr. Radha Gresham on phone with [facility] Access to bennett hospitalist at [address], RN  04/19/19 2164

## 2019-04-19 NOTE — PROGRESS NOTES
Spoke with Dr. Trini Bolden and updated physician on patients current status. Physician to enter orders. Patient updated.

## 2019-04-19 NOTE — ED PROVIDER NOTES
eMERGENCY dEPARTMENT eNCOUnter      Pt Name: Ester Jenkins  MRN: [id number]  [place] 1950  Date of evaluation: 4/19/2019      CHIEF COMPLAINT       Chief Complaint   Patient presents with    Abdominal Pain         HISTORY OF PRESENT ILLNESS    Ester Jenkins is a 77 y.o. female who presents with abdominal pain. Patient states she has been having left quadrant abdominal pain since yesterday. She has a history of ulcerative colitis and she states has been flaring up on her for last few days, usually means that she has some pain i, and then has to go to the bathroom right away. She states yesterday she has tried multiple times over the bathroom with his pain but has not helped. Rates the pain as severe in nature, associated with some nausea and vomiting. No chest pain or shortness of breath. No fever or chills. No exacerbating or relieving factors. She has a history of liver transplant first one, because of a sending: Cholangitis secondary because of underlying cancer to liver this when she has had for the last 9 years        REVIEW OF SYSTEMS       Review of systems are reviewed and negative except stated above in HPI     [address]    has a past medical history of Tachycardia. SURGICAL HISTORY      has a past surgical history that includes Liver transplant; hernia repair; knee surgery; and Cholecystectomy.     CURRENT MEDICATIONS       Previous Medications    ACETAMINOPHEN (TYLENOL) 500 MG TABLET    Take 500 mg by mouth as needed    AMINO ACIDS (DAILY AMINO 6000 PO)    Take 1 tablet by mouth    AMINO ACIDS (DAILY AMINO 6000 PO)    Take 3,000 mcg by mouth    AMINO ACIDS (DAILY AMINO 6000 PO)    Take 1 tablet by mouth    AMINO ACIDS (DAILY AMINO 6000 PO)    Take 20 mg by mouth daily    ASPIRIN 81 MG CHEWABLE TABLET    Take 81 mg by mouth daily    CONJUGATED ESTROGENS (PREMARIN) 0.625 MG/GM VAGINAL CREAM    Place 0.5 g vaginally as needed    DOCUSATE SODIUM (COLACE) 100 MG CAPSULE    Take 100 mg by mouth as needed    METOPROLOL SUCCINATE (TOPROL XL) 25 MG EXTENDED RELEASE TABLET    Take 25 mg by mouth daily    TACROLIMUS (PROGRAF) 1 MG CAPSULE    Take 4 mg by mouth 2 times daily    URSODIOL (ACTIGALL) 300 MG CAPSULE    Take 600 mg by mouth 2 times daily       ALLERGIES     is allergic to codeine; compazine [prochlorperazine]; morphine; and moxifloxacin. FAMILY HISTORY     has no family status information on file. family history is not on file. SOCIAL HISTORY      reports that she has never smoked. She has never used smokeless tobacco. She reports that she does not drink alcohol or use drugs. PHYSICAL EXAM     INITIAL VITALS:  height is 5' 7\" (1.702 m) and weight is 83 kg (183 lb). Her oral temperature is 98.2 °F (36.8 °C). Her blood pressure is 151/93 (abnormal) and her pulse is 76. Her respiration is 16 and oxygen saturation is 97%. Gen.: Patient is very pleasant, elderly female who appears uncomfortable, second to pain. HEENT: Head is atraumatic. Mouth shows moist mucous membranes. Conjunctiva is clear. Neck: Supple. No meningismus. No lymphadenopathy. Respiratory: Lung sounds are clear bilateral.  Cardiac: Heart is regular rate and rhythm. GI: Abdomen soft, diffusely tender throughout, with increasing tenderness to left side of her abdomen. No rebound or guarding. No pulsatile masses or bruits. Bowel sounds are present, but slightly high-pitched. Peripheral exam no cyanosis or edema.   Neuro: Patient is no gross focal neurological deficits    DIFFERENTIAL DIAGNOSIS/ MDM:     , Diverticulitis, bowel obstruction    DIAGNOSTIC RESULTS     EKG: All EKG's are interpreted by the Emergency Department Physician who either signs or Co-signs this chart in the absence of a cardiologist.    .  EKG interpreted by me, reveals a sinus rhythm at a rate of 88 with no acute, so elevations or depressions, poor R wave at C3 before, with no old one to compare to, and some ST inversion and 12 and aVL    RADIOLOGY:   I directly visualized the following  images and reviewed the radiologist interpretations:  CT ABDOMEN PELVIS WO CONTRAST    (Results Pending)         LABS:  Labs Reviewed   CBC WITH AUTO DIFFERENTIAL - Abnormal; Notable for the following components:       Result Value    Hemoglobin 11.0 (*)     Hematocrit 35.4 (*)     MCV 72.5 (*)     MCH 22.5 (*)     RDW 15.7 (*)     Eosinophils % 6 (*)     All other components within normal limits   TROP/MYOGLOBIN - Abnormal; Notable for the following components:    Myoglobin 68 (*)     All other components within normal limits   COMPREHENSIVE METABOLIC PANEL   LIPASE   URINALYSIS         EMERGENCY DEPARTMENT COURSE:   Vitals:    Vitals:    04/19/19 0610   BP: (!) 151/93   Pulse: 76   Resp: 16   Temp: 98.2 °F (36.8 °C)   TempSrc: Oral   SpO2: 97%   Weight: 83 kg (183 lb)   Height: 5' 7\" (1.702 m)     -------------------------  BP: (!) 151/93, Temp: 98.2 °F (36.8 °C), Pulse: 76, Resp: 16    Orders Placed This Encounter   Medications    ondansetron (ZOFRAN) injection 4 mg    fentaNYL (SUBLIMAZE) injection 25 mcg           Re-evaluation Notes    At this time. Care will be turned over to oncoming physician, Dr. Jasmyn Bond for final disposition, diagnosis, secondary to the end of my shift    CRITICAL CARE:   None      CONSULTS:      PROCEDURES:  None    FINAL IMPRESSION    No diagnosis found. DISPOSITION/PLAN   DISPOSITION        Condition on Disposition        PATIENT REFERRED TO:  No follow-up provider specified. DISCHARGE MEDICATIONS:  New Prescriptions    No medications on file       (Please note that portions of this note were completed with a voice recognition program.  Efforts were made to edit the dictations but occasionally words are mis-transcribed.)    Christianson MD, F.A.C.E.P.   Attending Emergency Physician        Porter Correia MD  04/19/19 9862

## 2019-04-19 NOTE — ED PROVIDER NOTES
NEGATIVE    Bilirubin Urine NEGATIVE NEGATIVE    Ketones, Urine NEGATIVE NEGATIVE    Specific Gravity, UA 1.025 1.005 - 1.030    Urine Hgb NEGATIVE NEGATIVE    pH, UA 5.0 5.0 - 8.0    Protein, UA 1+ (A) NEGATIVE    Urobilinogen, Urine Normal Normal    Nitrite, Urine NEGATIVE NEGATIVE    Leukocyte Esterase, Urine TRACE (A) NEGATIVE    Urinalysis Comments NOT REPORTED    SPECIMEN REJECTION   Result Value Ref Range    Specimen Source . BLOOD     Ordered Test CP LIP     Reason for Rejection Unable to perform testing: Specimen hemolyzed. - NOT REPORTED    Comprehensive Metabolic Panel   Result Value Ref Range    Glucose 172 (H) 70 - 99 mg/dL    BUN 38 (H) 8 - 23 mg/dL    CREATININE 2.10 (H) 0.50 - 0.90 mg/dL    Bun/Cre Ratio NOT REPORTED 9 - 20    Calcium 10.1 8.6 - 10.4 mg/dL    Sodium 135 135 - 144 mmol/L    Potassium 4.9 3.7 - 5.3 mmol/L    Chloride 103 98 - 107 mmol/L    CO2 21 20 - 31 mmol/L    Anion Gap 11 9 - 17 mmol/L    Alkaline Phosphatase 159 (H) 35 - 104 U/L    ALT 9 5 - 33 U/L    AST 13 <32 U/L    Total Bilirubin 0.20 (L) 0.3 - 1.2 mg/dL    Total Protein 8.1 6.4 - 8.3 g/dL    Alb 3.9 3.5 - 5.2 g/dL    Albumin/Globulin Ratio 0.9 (L) 1.0 - 2.5    GFR Non-African American 23 (L) >60 mL/min    GFR  28 (L) >60 mL/min    GFR Comment          GFR Staging NOT REPORTED    Lipase   Result Value Ref Range    Lipase 9 (L) 13 - 60 U/L   Microscopic Urinalysis   Result Value Ref Range    -          WBC, UA 2 TO 5 0 - 5 /HPF    RBC, UA 0 TO 2 0 - 2 /HPF    Casts UA NOT REPORTED 0 - 2 /LPF    Crystals UA NOT REPORTED None /HPF    Epithelial Cells UA 10 TO 20 0 - 5 /HPF    Renal Epithelial, Urine NOT REPORTED 0 /HPF    Bacteria, UA FEW (A) None    Mucus, UA NOT REPORTED None    Trichomonas, UA NOT REPORTED None    Amorphous, UA NOT REPORTED None    Other Observations UA NOT REPORTED NOT REQ.     Yeast, UA NOT REPORTED None     8:25 AM  Patient reports minimal transient improvement of her pain but pain and now nausea returned. With recurrent uncontrollable pain am more concerned of a bowel obstruction with this patient and do feel she requires admission. At this point have a page out to the admitting physician at [address] [place] [address] for this patient. 8:39 AM  I have discussed this patient with Dr. Juan Alba who is kind enough to accept this patient. He agrees with NG tube placement. If gone ahead and had the nursing staff in certain and NG tube. Patient will be admitted. Impression:  1. SBO (small bowel obstruction) ([address]    2.  Generalized abdominal pain        Condition: Good    Marshall Joyce MD, Sofya Dotson  Emergency Medicine Attending        Marshall Joyce MD  04/19/19 5291

## 2019-04-20 ENCOUNTER — APPOINTMENT (OUTPATIENT)
Dept: GENERAL RADIOLOGY | Age: 69
DRG: 385 | End: 2019-04-20
Payer: MEDICARE

## 2019-04-20 PROBLEM — R79.89 ELEVATED LFTS: Status: ACTIVE | Noted: 2019-04-20

## 2019-04-20 PROBLEM — K51.919 EXACERBATION OF ULCERATIVE COLITIS WITH COMPLICATION (HCC): Status: ACTIVE | Noted: 2019-04-20

## 2019-04-20 PROBLEM — D57.3 SICKLE CELL TRAIT (HCC): Status: ACTIVE | Noted: 2019-04-20

## 2019-04-20 PROBLEM — N18.30 STAGE 3 CHRONIC KIDNEY DISEASE (HCC): Status: ACTIVE | Noted: 2019-04-20

## 2019-04-20 PROBLEM — K51.90 ULCERATIVE COLITIS (HCC): Status: ACTIVE | Noted: 2019-04-20

## 2019-04-20 PROBLEM — N17.9 AKI (ACUTE KIDNEY INJURY) (HCC): Status: ACTIVE | Noted: 2019-04-20

## 2019-04-20 PROBLEM — Z94.4 LIVER TRANSPLANT RECIPIENT (HCC): Status: ACTIVE | Noted: 2019-04-20

## 2019-04-20 PROBLEM — I10 BENIGN ESSENTIAL HTN: Status: ACTIVE | Noted: 2019-04-20

## 2019-04-20 LAB
ALBUMIN SERPL-MCNC: 3.4 G/DL (ref 3.5–5.2)
ALBUMIN/GLOBULIN RATIO: ABNORMAL (ref 1–2.5)
ALP BLD-CCNC: 147 U/L (ref 35–104)
ALT SERPL-CCNC: 8 U/L (ref 5–33)
ANION GAP SERPL CALCULATED.3IONS-SCNC: 14 MMOL/L (ref 9–17)
AST SERPL-CCNC: 13 U/L
BILIRUB SERPL-MCNC: 0.28 MG/DL (ref 0.3–1.2)
BILIRUBIN DIRECT: <0.08 MG/DL
BILIRUBIN, INDIRECT: ABNORMAL MG/DL (ref 0–1)
BUN BLDV-MCNC: 26 MG/DL (ref 8–23)
BUN/CREAT BLD: 19 (ref 9–20)
CALCIUM SERPL-MCNC: 8.5 MG/DL (ref 8.6–10.4)
CHLORIDE BLD-SCNC: 106 MMOL/L (ref 98–107)
CO2: 19 MMOL/L (ref 20–31)
CREAT SERPL-MCNC: 1.37 MG/DL (ref 0.5–0.9)
GFR AFRICAN AMERICAN: 46 ML/MIN
GFR NON-AFRICAN AMERICAN: 38 ML/MIN
GFR SERPL CREATININE-BSD FRML MDRD: ABNORMAL ML/MIN/{1.73_M2}
GFR SERPL CREATININE-BSD FRML MDRD: ABNORMAL ML/MIN/{1.73_M2}
GLOBULIN: ABNORMAL G/DL (ref 1.5–3.8)
GLUCOSE BLD-MCNC: 133 MG/DL (ref 70–99)
HCT VFR BLD CALC: 33.1 % (ref 36–46)
HEMOGLOBIN: 10.5 G/DL (ref 12–16)
INR BLD: 1.1
LACTIC ACID: 1 MMOL/L (ref 0.5–2.2)
MCH RBC QN AUTO: 22.9 PG (ref 26–34)
MCHC RBC AUTO-ENTMCNC: 31.7 G/DL (ref 31–37)
MCV RBC AUTO: 72.2 FL (ref 80–100)
NRBC AUTOMATED: ABNORMAL PER 100 WBC
PDW BLD-RTO: 16.1 % (ref 11.5–14.5)
PLATELET # BLD: 155 K/UL (ref 130–400)
PMV BLD AUTO: 10.5 FL (ref 6–12)
POTASSIUM SERPL-SCNC: 4.2 MMOL/L (ref 3.7–5.3)
PROTHROMBIN TIME: 11.3 SEC (ref 9.7–11.6)
RBC # BLD: 4.58 M/UL (ref 4–5.2)
SODIUM BLD-SCNC: 139 MMOL/L (ref 135–144)
TOTAL PROTEIN: 7.4 G/DL (ref 6.4–8.3)
WBC # BLD: 4.6 K/UL (ref 3.5–11)

## 2019-04-20 PROCEDURE — 36415 COLL VENOUS BLD VENIPUNCTURE: CPT

## 2019-04-20 PROCEDURE — 93005 ELECTROCARDIOGRAM TRACING: CPT

## 2019-04-20 PROCEDURE — 85610 PROTHROMBIN TIME: CPT

## 2019-04-20 PROCEDURE — 2580000003 HC RX 258: Performed by: FAMILY MEDICINE

## 2019-04-20 PROCEDURE — 97166 OT EVAL MOD COMPLEX 45 MIN: CPT

## 2019-04-20 PROCEDURE — 6360000002 HC RX W HCPCS: Performed by: FAMILY MEDICINE

## 2019-04-20 PROCEDURE — 85027 COMPLETE CBC AUTOMATED: CPT

## 2019-04-20 PROCEDURE — 6370000000 HC RX 637 (ALT 250 FOR IP): Performed by: FAMILY MEDICINE

## 2019-04-20 PROCEDURE — 97161 PT EVAL LOW COMPLEX 20 MIN: CPT

## 2019-04-20 PROCEDURE — 74018 RADEX ABDOMEN 1 VIEW: CPT

## 2019-04-20 PROCEDURE — 99222 1ST HOSP IP/OBS MODERATE 55: CPT | Performed by: INTERNAL MEDICINE

## 2019-04-20 PROCEDURE — 1200000000 HC SEMI PRIVATE

## 2019-04-20 PROCEDURE — 2500000003 HC RX 250 WO HCPCS: Performed by: FAMILY MEDICINE

## 2019-04-20 PROCEDURE — 97116 GAIT TRAINING THERAPY: CPT

## 2019-04-20 PROCEDURE — 83605 ASSAY OF LACTIC ACID: CPT

## 2019-04-20 PROCEDURE — 80048 BASIC METABOLIC PNL TOTAL CA: CPT

## 2019-04-20 PROCEDURE — 97535 SELF CARE MNGMENT TRAINING: CPT

## 2019-04-20 PROCEDURE — 80076 HEPATIC FUNCTION PANEL: CPT

## 2019-04-20 RX ORDER — 0.9 % SODIUM CHLORIDE 0.9 %
10 VIAL (ML) INJECTION ONCE
Status: COMPLETED | OUTPATIENT
Start: 2019-04-20 | End: 2019-04-20

## 2019-04-20 RX ADMIN — SODIUM CHLORIDE: 9 INJECTION, SOLUTION INTRAVENOUS at 12:04

## 2019-04-20 RX ADMIN — HEPARIN SODIUM 5000 UNITS: 5000 INJECTION, SOLUTION INTRAVENOUS; SUBCUTANEOUS at 08:10

## 2019-04-20 RX ADMIN — CHLORASEPTIC 1 SPRAY: 1.5 LIQUID ORAL at 20:17

## 2019-04-20 RX ADMIN — Medication 10 ML: at 16:33

## 2019-04-20 RX ADMIN — ONDANSETRON 4 MG: 2 INJECTION INTRAMUSCULAR; INTRAVENOUS at 08:04

## 2019-04-20 RX ADMIN — TACROLIMUS 1 MG: 1 CAPSULE ORAL at 10:42

## 2019-04-20 RX ADMIN — HYDROCORTISONE SODIUM SUCCINATE 100 MG: 100 INJECTION, POWDER, FOR SOLUTION INTRAMUSCULAR; INTRAVENOUS at 16:33

## 2019-04-20 RX ADMIN — SODIUM CHLORIDE: 9 INJECTION, SOLUTION INTRAVENOUS at 03:32

## 2019-04-20 RX ADMIN — HEPARIN SODIUM 5000 UNITS: 5000 INJECTION, SOLUTION INTRAVENOUS; SUBCUTANEOUS at 20:17

## 2019-04-20 RX ADMIN — HYDROMORPHONE HYDROCHLORIDE 0.5 MG: 1 INJECTION, SOLUTION INTRAMUSCULAR; INTRAVENOUS; SUBCUTANEOUS at 08:10

## 2019-04-20 RX ADMIN — CHLORASEPTIC 1 SPRAY: 1.5 LIQUID ORAL at 03:30

## 2019-04-20 RX ADMIN — TACROLIMUS 1 MG: 1 CAPSULE ORAL at 20:17

## 2019-04-20 RX ADMIN — FAMOTIDINE 20 MG: 10 INJECTION, SOLUTION INTRAVENOUS at 16:32

## 2019-04-20 ASSESSMENT — PAIN SCALES - GENERAL
PAINLEVEL_OUTOF10: 4
PAINLEVEL_OUTOF10: 4
PAINLEVEL_OUTOF10: 3
PAINLEVEL_OUTOF10: 9
PAINLEVEL_OUTOF10: 4
PAINLEVEL_OUTOF10: 0

## 2019-04-20 ASSESSMENT — PAIN DESCRIPTION - PROGRESSION
CLINICAL_PROGRESSION: GRADUALLY IMPROVING

## 2019-04-20 ASSESSMENT — PAIN - FUNCTIONAL ASSESSMENT: PAIN_FUNCTIONAL_ASSESSMENT: PREVENTS OR INTERFERES SOME ACTIVE ACTIVITIES AND ADLS

## 2019-04-20 ASSESSMENT — ENCOUNTER SYMPTOMS
SHORTNESS OF BREATH: 0
COUGH: 0
DIARRHEA: 0

## 2019-04-20 ASSESSMENT — PAIN DESCRIPTION - LOCATION
LOCATION: ABDOMEN;THROAT
LOCATION: ABDOMEN

## 2019-04-20 ASSESSMENT — PAIN DESCRIPTION - FREQUENCY
FREQUENCY: INTERMITTENT
FREQUENCY: INTERMITTENT

## 2019-04-20 ASSESSMENT — PAIN DESCRIPTION - PAIN TYPE
TYPE: ACUTE PAIN

## 2019-04-20 ASSESSMENT — PAIN DESCRIPTION - ORIENTATION
ORIENTATION: LEFT
ORIENTATION: LEFT;LOWER

## 2019-04-20 ASSESSMENT — PAIN DESCRIPTION - ONSET: ONSET: ON-GOING

## 2019-04-20 ASSESSMENT — PAIN DESCRIPTION - DESCRIPTORS: DESCRIPTORS: CRAMPING

## 2019-04-20 NOTE — PROGRESS NOTES
General Surgery Progress Note            PATIENT NAME: Padmini Germain     TODAY'S DATE: 4/20/2019, 1:05 PM    SUBJECTIVE:    Pt  Seen and examined. OBJECTIVE:   VITALS:  /71   Pulse 94   Temp 98.4 °F (36.9 °C) (Oral)   Resp 18   Ht 5' 7\" (1.702 m)   Wt 182 lb 4.8 oz (82.7 kg)   SpO2 98%   BMI 28.55 kg/m²      INTAKE/OUTPUT:      Intake/Output Summary (Last 24 hours) at 4/20/2019 1305  Last data filed at 4/20/2019 0730  Gross per 24 hour   Intake 2373 ml   Output 1000 ml   Net 1373 ml                 CONSTITUTIONAL:  awake and alert. No acute distress  HEART:   Regular   LUNGS:   Clear   ABDOMEN:   Abdomen soft, minimally tender left abdomen, without rebound or guarding , non-distended. Hypoactive BS. Passing flatus. NGT output minimal  EXTREMITIES:   No edema    Data:  CBC with Differential:    Lab Results   Component Value Date    WBC 4.6 04/20/2019    RBC 4.58 04/20/2019    HGB 10.5 04/20/2019    HCT 33.1 04/20/2019     04/20/2019    MCV 72.2 04/20/2019    MCH 22.9 04/20/2019    MCHC 31.7 04/20/2019    RDW 16.1 04/20/2019    LYMPHOPCT 26 04/19/2019    MONOPCT 8 04/19/2019    BASOPCT 1 04/19/2019    MONOSABS 0.50 04/19/2019    LYMPHSABS 1.70 04/19/2019    EOSABS 0.40 04/19/2019    BASOSABS 0.10 04/19/2019    DIFFTYPE NOT REPORTED 04/19/2019     BMP:    Lab Results   Component Value Date     04/20/2019    K 4.2 04/20/2019     04/20/2019    CO2 19 04/20/2019    BUN 26 04/20/2019    LABALBU 3.4 04/20/2019    CREATININE 1.37 04/20/2019    CALCIUM 8.5 04/20/2019    GFRAA 46 04/20/2019    LABGLOM 38 04/20/2019    GLUCOSE 133 04/20/2019       Radiology Review:    KUB reviewed      ASSESSMENT     Principal Problem:    SBO (small bowel obstruction) ([address]  Resolved Problems:    * No resolved hospital problems. *  Abdominal pain, likely UC flare up. Doubt SBO    Plan  1. Await GI input  2. Continue NGT to LIS for now  3.  Up as tolerated

## 2019-04-20 NOTE — PROGRESS NOTES
temperature source Oral, resp. rate 18, height 5' 7\" (1.702 m), weight 182 lb 4.8 oz (82.7 kg), SpO2 98 %. Subjective:  Symptoms:  Improved. She reports weakness and anorexia. No shortness of breath, malaise, cough, chest pain, headache, chest pressure, diarrhea or anxiety. Diet:  NPO. Activity level: Impaired due to weakness. Pain:  She complains of pain that is moderate. She reports pain is improving. Pain is well controlled. Objective:  General Appearance:  Comfortable, ill-appearing, in no acute distress and not in pain. Vital signs: (most recent): Blood pressure 130/71, pulse 94, temperature 98.4 °F (36.9 °C), temperature source Oral, resp. rate 18, height 5' 7\" (1.702 m), weight 182 lb 4.8 oz (82.7 kg), SpO2 98 %. Output: Producing urine and no stool output. HEENT: Normal HEENT exam.    Lungs:  Normal effort and normal respiratory rate. Breath sounds clear to auscultation. She is not in respiratory distress. No stridor. There are decreased breath sounds. No rales. Heart: Normal rate. Regular rhythm. S1 normal and S2 normal.    Chest: No chest wall tenderness. Abdomen: Abdomen is soft and non-distended. Hypoactive bowel sounds. There is right lower quadrant and left lower quadrant tenderness. There is no rebound tenderness. There is no guarding. There is no splenomegaly. There is no hepatomegaly. Extremities: Decreased range of motion. There is no local swelling. Neurological: Patient is alert and oriented to person, place and time. Pupils:  Pupils are equal, round, and reactive to light. Skin:  Warm and dry. Assessment:    Condition: In serious condition. Improving. (Patient Active Problem List:     SBO (small bowel obstruction) (HCC)     Exacerbation of ulcerative colitis with complication ([facility]     Liver transplant recipient [facility])     Benign essential HTN     Sickle cell trait ([facility]    ).      Plan:   (Pt feels better  Pain control reasonable  Nausea better  No more vomiting  SBO resolved  Surgery input appreciated  Suspect Ac exacerbation UC  Plan remove NG if OK w SUrgery and GI  Will start steroids  Solucortef 100 q6  Abx? Per GI if recommended  IS  PPI  KAYLA improved  CKD3  Disease modifying agents per GI  Will check CRP daily  IS  PPI  D/w RN and 3C).        Aniceto Burnette MD  4/20/2019

## 2019-04-20 NOTE — PLAN OF CARE
Fall  precautions in place Using call light appropriately Remains alert and oriented Gait slightly unsteady without walker  Had dilaudid once for pain earlier in am Verbalizes pain has decreased since admission  No BM but passing flatus   Problem: Falls - Risk of:  Goal: Will remain free from falls  Description  Will remain free from falls  Outcome: Ongoing     Problem: Pain:  Goal: Pain level will decrease  Description  Pain level will decrease  Outcome: Ongoing     Problem:  Bowel/Gastric:  Goal: Ability to achieve a regular elimination pattern will improve  Description  Ability to achieve a regular elimination pattern will improve  Outcome: Ongoing

## 2019-04-20 NOTE — CONSULTS
KNEE ARTHROPLASTY Left 2018       Prior to Admission medications    Medication Sig Start Date End Date Taking?  Authorizing Provider   aspirin 81 MG chewable tablet Take 81 mg by mouth daily   Yes Historical Provider, MD   docusate sodium (COLACE) 100 MG capsule Take 100 mg by mouth as needed   Yes Historical Provider, MD   ursodiol (ACTIGALL) 300 MG capsule Take 600 mg by mouth 2 times daily   Yes Historical Provider, MD   metoprolol succinate (TOPROL XL) 25 MG extended release tablet Take 25 mg by mouth daily 8/30/18  Yes Historical Provider, MD   tacrolimus (PROGRAF) 1 MG capsule Take 2 mg by mouth 2 times daily    Yes Historical Provider, MD   conjugated estrogens (PREMARIN) 0.625 MG/GM vaginal cream Place 0.5 g vaginally as needed   Yes Historical Provider, MD   acetaminophen (TYLENOL) 500 MG tablet Take 500 mg by mouth as needed   Yes Historical Provider, MD   Triprolidine-Pseudoephedrine (ACTANOL PO) Take 600 mg by mouth 2 times daily   Yes Historical Provider, MD   Cholecalciferol-Vitamin C (VITAMIN D3-VITAMIN C) 1000-500 UNIT-MG CAPS Take 1 tablet by mouth 2 times daily   Yes Historical Provider, MD   ferrous sulfate 325 (65 Fe) MG tablet Take 325 mg by mouth daily (with breakfast)   Yes Historical Provider, MD   Multiple Vitamins-Iron (ONE DAILY MULTIVITAMIN/IRON PO) Take 1 tablet by mouth daily   Yes Historical Provider, MD       Scheduled Meds:   ondansetron  4 mg Intravenous Once    sodium chloride flush  10 mL Intravenous 2 times per day    tacrolimus  1 mg Sublingual BID    heparin (porcine)  5,000 Units Subcutaneous BID     Continuous Infusions:   sodium chloride 125 mL/hr at 04/20/19 0332     PRN Meds:sodium chloride flush, magnesium hydroxide, acetaminophen, ondansetron **OR** ondansetron, HYDROmorphone, phenol    Allergies   Allergen Reactions    Codeine Other (See Comments)     Patient becomes very lethargic and BP drops     Compazine [Prochlorperazine] Other (See Comments)     Lock jaw Other:50]    General:  Awake, alert, not in distress. Appears to be stated age. HEENT: Atraumatic, normocephalic. Anicteric sclera. Pink and moist oral mucosa. Neck supple. No JVD. NG in place. Chest: Bilateral air entry, clear to auscultation, no wheezing, rhonchi or rales. Cardiovascular: RRR, S1S2, no murmur, rub or gallop. No lower extremity edema. Abdomen: Soft, non tender to palpation. Hypoactive bowel sounds. Musculoskeletal: Active ROM x 4 extremities. No cyanosis or clubbing. Integumentary: Pink, warm and dry. Free from rash or lesions. Skin turgor normal.  CNS: Oriented to person, place and time. Cranial nerves grossly intact. Speech clear. Face symmetrical. No tremor.      Data:    CBC:   Lab Results   Component Value Date    WBC 4.6 04/20/2019    HGB 10.5 (L) 04/20/2019    HCT 33.1 (L) 04/20/2019    MCV 72.2 (L) 04/20/2019     04/20/2019     BMP:    Lab Results   Component Value Date     04/20/2019     04/19/2019    K 4.2 04/20/2019    K 4.9 04/19/2019     04/20/2019     04/19/2019    CO2 19 (L) 04/20/2019    CO2 21 04/19/2019    BUN 26 (H) 04/20/2019    BUN 38 (H) 04/19/2019    CREATININE 1.37 (H) 04/20/2019    CREATININE 2.10 (H) 04/19/2019    GLUCOSE 133 (H) 04/20/2019    GLUCOSE 172 (H) 04/19/2019     CMP:   Lab Results   Component Value Date     04/20/2019    K 4.2 04/20/2019     04/20/2019    CO2 19 04/20/2019    BUN 26 04/20/2019    CREATININE 1.37 04/20/2019    GLUCOSE 133 04/20/2019    CALCIUM 8.5 04/20/2019    PROT 7.4 04/20/2019    LABALBU 3.4 04/20/2019    BILITOT 0.28 04/20/2019    ALKPHOS 147 04/20/2019    AST 13 04/20/2019    ALT 8 04/20/2019      Hepatic:   Lab Results   Component Value Date    AST 13 04/20/2019    AST 15 04/19/2019    AST 13 04/19/2019    ALT 8 04/20/2019    ALT 10 04/19/2019    ALT 9 04/19/2019    BILITOT 0.28 (L) 04/20/2019    BILITOT 0.22 (L) 04/19/2019    BILITOT 0.20 (L) 04/19/2019    ALKPHOS 147 (H) 04/20/2019 ALKPHOS 150 (H) 04/19/2019    ALKPHOS 159 (H) 04/19/2019     BNP: No results found for: BNP  Lipids: No results found for: CHOL, HDL  INR:   Lab Results   Component Value Date    INR 1.1 04/20/2019     PTH: No results found for: PTH  Phosphorus:  No results found for: PHOS  Ionized Calcium: No results found for: IONCA  Magnesium: No results found for: MG  Albumin:   Lab Results   Component Value Date    LABALBU 3.4 04/20/2019     Last 3 CK, CKMB, Troponin: @LABRCNT(CKTOTAL:3,CKMB:3,TROPONINI:3)       URINE:)No results found for: Junious Azalia    Radiology:   Reviewed. Assessment:  1. Acute Kidney Injury-prerenal. Cr is improving. 2. CKD 3 baseline  3. Recent hyponatremia-improving  4. Metabolic Acidosis  5. Liver txp on prograf  6. SBO vs delayed emptying. 7. Ulcerative colitis    Plan:  1. Creatinine improving with hydration. Continue IVF. 2. CT showed bilateral renal atrophy with multiple left renal cysts. 3. BP stable. 4. Monitor bicarb level for now. 5. DC MOM. Avoid fleet's enemas. 6. On sublingual prograf dosing per Marielena Star recommendations. 7. AM labs. Avoid nephrotoxic drugs, fleet's enemas, and IV contrast exposure. Thank you for the consultation. Please do not hesitate to contact us for any further questions/concerns. We will continue to follow along with you. Pt seen in collaboration with Dr. Thu Gonzales. Electronically signed by MILAGRO Gonzalez - CNP  on 4/20/2019 at 9:32 AM     Patient seen and examined. Agree with above note. Above note was modified. We will continue to follow up with you. Electronically signed by Morro Cosme MD on 4/20/2019 at 6:30 PM  [facility].   Ph: [phone]

## 2019-04-20 NOTE — CONSULTS
0.5 mg, Intravenous, Q4H PRN, Christina Chambers MD, 0.5 mg at 04/20/19 0810    tacrolimus (PROGRAF) capsule 1 mg, 1 mg, Sublingual, BID, Christina Chambers MD, 1 mg at 04/20/19 1042    phenol 1.4 % mouth spray 1 spray, 1 spray, Mouth/Throat, Q2H PRN, Oneil Kam MD, 1 spray at 04/20/19 0330    heparin (porcine) injection 5,000 Units, 5,000 Units, Subcutaneous, BID, Christina Chambers MD, 5,000 Units at 04/20/19 0810       ALLERGIES:      Allergies   Allergen Reactions    Codeine Other (See Comments)     Patient becomes very lethargic and BP drops     Compazine [Prochlorperazine] Other (See Comments)     Lock jaw     Morphine Other (See Comments)     Patient becomes very lethargic and BP drops     Moxifloxacin Rash          FAMILY HISTORY: The patient's family history was reviewed.         SOCIAL HISTORY:   Social History     Socioeconomic History    Marital status:      Spouse name: Not on file    Number of children: Not on file    Years of education: Not on file    Highest education level: Not on file   Occupational History    Not on file   Social Needs    Financial resource strain: Not on file    Food insecurity:     Worry: Not on file     Inability: Not on file    Transportation needs:     Medical: Not on file     Non-medical: Not on file   Tobacco Use    Smoking status: Never Smoker    Smokeless tobacco: Never Used   Substance and Sexual Activity    Alcohol use: Never     Frequency: Never    Drug use: Never    Sexual activity: Not on file   Lifestyle    Physical activity:     Days per week: Not on file     Minutes per session: Not on file    Stress: Not on file   Relationships    Social connections:     Talks on phone: Not on file     Gets together: Not on file     Attends [employer] service: Not on file     Active member of club or organization: Not on file     Attends meetings of clubs or organizations: Not on file     Relationship status: Not on file    Intimate partner violence: Fear of current or ex partner: Not on file     Emotionally abused: Not on file     Physically abused: Not on file     Forced sexual activity: Not on file   Other Topics Concern    Not on file   Social History Narrative    Not on file         REVIEW OF SYSTEMS: A 12-point review of systems was obtained and pertinent positives andnegatives were enumerated above in the history of present illness. All other reviewed systems / symptoms were negative. Review of Systems      PHYSICAL EXAMINATION: Vital signs reviewed per the nursing documentation. /71   Pulse 94   Temp 98.4 °F (36.9 °C) (Oral)   Resp 18   Ht 5' 7\" (1.702 m)   Wt 182 lb 4.8 oz (82.7 kg)   SpO2 98%   BMI 28.55 kg/m²    [unfilled]   Body mass index is 28.55 kg/m². General:  A O x 3 in NAD   Psych: . Normal affect. Mentation normal   HEENT: PERRLA. Clear conjunctivae and sclerae. Moist oral mucosae, no lesions orulcers. The neck is supple, without lymphadenopathy or jugular venous distension. No masses. Normal thyroid. Cardiovascular: S1 S2 RRR no rubs or murmurs. Pulmonary: clear BL. No accessory muscle usage. Abdominal Exam: Soft, mild scattered tenderness ND, no hepato or spleno megaly, +BS, no ascites. No groin masses or lymphadenopathy. Extremities: No edema. Skin: Warm skin. No skin rash. No spider nevi palmar erythema naildystrophy. Joint: No joint swelling or deformity. Neurological: intact sensory. DTR+.  No asterixis     LABORATORY DATA: Reviewed   Lab Results   Component Value Date    WBC 4.6 04/20/2019    HGB 10.5 (L) 04/20/2019    HCT 33.1 (L) 04/20/2019    MCV 72.2 (L) 04/20/2019     04/20/2019     04/20/2019    K 4.2 04/20/2019     04/20/2019    CO2 19 (L) 04/20/2019    BUN 26 (H) 04/20/2019    CREATININE 1.37 (H) 04/20/2019    LABALBU 3.4 (L) 04/20/2019    BILITOT 0.28 (L) 04/20/2019    ALKPHOS 147 (H) 04/20/2019    AST 13 04/20/2019    ALT 8 04/20/2019    INR 1.1 04/20/2019 Lab Results   Component Value Date    RBC 4.58 04/20/2019    HGB 10.5 (L) 04/20/2019    MCV 72.2 (L) 04/20/2019    MCH 22.9 (L) 04/20/2019    MCHC 31.7 04/20/2019    RDW 16.1 (H) 04/20/2019    MPV 10.5 04/20/2019    BASOPCT 1 04/19/2019    LYMPHSABS 1.70 04/19/2019    MONOSABS 0.50 04/19/2019    NEUTROABS 3.90 04/19/2019    EOSABS 0.40 04/19/2019    BASOSABS 0.10 04/19/2019          DIAGNOSTIC TESTING:   Ct Abdomen Pelvis Wo Contrast    Result Date: 4/19/2019  EXAMINATION: CT OF THE ABDOMEN AND PELVIS WITHOUT CONTRAST 4/19/2019 6:38 am TECHNIQUE: CT of the abdomen and pelvis was performed without the administration of intravenous contrast. Multiplanar reformatted images are provided for review. Dose modulation, iterative reconstruction, and/or weight based adjustment of the mA/kV was utilized to reduce the radiation dose to as low as reasonably achievable. COMPARISON: None HISTORY: ORDERING SYSTEM PROVIDED HISTORY: ABDOMINAL PAIN TECHNOLOGIST PROVIDED HISTORY: Ordering Physician Provided Reason for Exam: Atypical Epigastric pain this early morning Acuity: Acute Type of Exam: Initial FINDINGS: Lower Chest: Dependent lung changes. Organs: Evaluation of the solid organs is limited without intravenous contrast. Postsurgical changes within the upper abdomen from history of liver transplant. No liver lesion. Mild pneumobilia. Cholecystectomy. Pancreatic atrophy. No definite pancreatic lesion. No splenomegaly. No adrenal lesion. No hydronephrosis. Bilateral renal atrophy. No renal calculus. Multiple left renal cysts. GI/Bowel: Small bowel anastomosis. Mildly dilated loop of small bowel proximal to the anastomosis. No acute inflammatory process. Pelvis: No free fluid. No bladder calculus. Peritoneum/Retroperitoneum: Atherosclerotic calcification of the abdominal aorta without aneurysmal dilatation. No adenopathy. Bones/Soft Tissues: Postsurgical changes along the lower anterior abdominal wall.   No acute abnormality. Diffuse osteopenia. Lumbar spine degenerative changes. Mildly dilated loop of small bowel proximal to the anastomosis. Findings may be on the basis of delayed transit versus mild partial small bowel obstruction. No high-grade obstruction. Xr Abdomen (kub) (single Ap View)    Result Date: 4/20/2019  EXAMINATION: SINGLE SUPINE XRAY VIEW(S) OF THE ABDOMEN 4/20/2019 7:32 am COMPARISON: April 19, 2019 HISTORY: ORDERING SYSTEM PROVIDED HISTORY: SBO TECHNOLOGIST PROVIDED HISTORY: SBO Acuity: Acute Type of Exam: Unknown FINDINGS: Enteric tube with the tip within the stomach. Moderate stool volume. No abnormally dilated loops of small bowel. No abnormally dilated loops of small bowel. Xr Abdomen For Ng/og/ne Tube Placement    Result Date: 4/19/2019  EXAMINATION: SINGLE SUPINE XRAY VIEW(S) OF THE ABDOMEN 4/19/2019 12:42 pm COMPARISON: None. HISTORY: ORDERING SYSTEM PROVIDED HISTORY: Confirmation of course of NG/OG/NE tube and location of tip of tube TECHNOLOGIST PROVIDED HISTORY: Confirmation of course of NG/OG/NE tube and location of tip of tube Portable? ->Yes Ordering Physician Provided Reason for Exam: NG Placement Acuity: Acute Type of Exam: Unknown Additional signs and symptoms: NG Placement FINDINGS: The enteric tube tip and side hole terminating in the body of the stomach. Nondilated small and large bowel gas pattern. Mild stool burden in the distal colon. Surgical clips project in the mid abdomen. The lung bases reveal no acute findings. Enteric tube tip and side hole terminate in the body of the stomach. IMPRESSION: Ms. Roman Pallas is a 77 y.o. female with small bowel obstruction. NG tube to suction. Possible ulcerative colitis flare. Solumedrol 20 mg per day. Monitor closely. Thank you for allowing me to participate in the care of Ms. Roman Pallas. For any further questions please do not hesitate to contact me.        Tee Lawson MD

## 2019-04-20 NOTE — PROGRESS NOTES
Physical Therapy    Facility/Department: [facility]  Initial Assessment    NAME: Didi Red  : 1950  MRN: [id number]    Date of Service: 2019    Discharge Recommendations:  Home with assist PRN   PT Equipment Recommendations  Equipment Needed: No    Assessment   Body structures, Functions, Activity limitations: Decreased functional mobility ; Decreased balance  Assessment: Pt able to ambulate safely in gonzalez, stiffness due to decreased activity  Prognosis: Excellent  Decision Making: Low Complexity  Patient Education: PT POC, safety  REQUIRES PT FOLLOW UP: Yes  Activity Tolerance  Activity Tolerance: Patient Tolerated treatment well       Patient Diagnosis(es): The primary encounter diagnosis was SBO (small bowel obstruction) ([facility] [address] A diagnosis of Generalized abdominal pain was also pertinent to this visit. has a past medical history of Liver transplant recipient [facility]), [facility] Tachycardia, and Ulcerative colitis ([facility] has a past surgical history that includes Liver transplant ([id number]); hernia repair; Cholecystectomy (); and Total knee arthroplasty (Left, 2018).     Restrictions  Restrictions/Precautions  Restrictions/Precautions: General Precautions  Required Braces or Orthoses?: No  Position Activity Restriction  Other position/activity restrictions: NG tube to suction  Vision/Hearing  Vision: Within Functional Limits  Hearing: Within functional limits     Subjective  General  Chart Reviewed: Yes  Patient assessed for rehabilitation services?: Yes  Family / Caregiver Present: No  Follows Commands: Within Functional Limits  Subjective  Subjective: Pt willing to get up and walking  Pain Screening  Patient Currently in Pain: Yes  Pain Assessment  Pain Assessment: 0-10  Pain Level: 4  Pain Type: Acute pain  Pain Location: Abdomen  Non-Pharmaceutical Pain Intervention(s): Ambulation/Increased Activity  Vital Signs  Patient Currently in Pain: Yes Orientation  Orientation  Overall Orientation Status: Within Functional Limits  Social/Functional History  Social/Functional History  Lives With: Spouse(and grandchildren 5,5, 15 (they have custody))  Type of Home: House  Home Layout: 1/2 bath on main level, Two level, Bed/Bath upstairs(12 stairs with full bathroom, bedroom, 1 rail)  Home Access: Stairs to enter without rails  Entrance Stairs - Number of Steps: 1  Bathroom Shower/Tub: Tub/Shower unit  Bathroom Toilet: Standard  Bathroom Equipment: Shower chair, Toilet raiser  Home Equipment: Cane, Rolling walker  ADL Assistance: Independent  Homemaking Assistance: Independent  Homemaking Responsibilities: Yes  Ambulation Assistance: Independent(cane outside of home)  Transfer Assistance: Independent  Active : Yes  Mode of Transportation: Car  Additional Comments: L knee replacement August 2018, pt raising grandchildren  Cognition   Cognition  Overall Cognitive Status: WFL    Objective          AROM RLE (degrees)  RLE AROM: WFL  AROM LLE (degrees)  LLE AROM : WFL  AROM RUE (degrees)  RUE AROM : WFL  AROM LUE (degrees)  LUE AROM : WFL  Strength RLE  Comment: 4/5 grossly  Strength LLE  Comment: 4/5 grossly  Strength RUE  Strength RUE: WFL  Strength LUE  Strength LUE: WFL     Sensation  Overall Sensation Status: WFL  Bed mobility  Rolling to Left: Supervision  Supine to Sit: Supervision  Scooting: Supervision  Transfers  Sit to Stand: Stand by assistance  Stand to sit: Stand by assistance  Ambulation  Ambulation?: Yes  More Ambulation?: No  Ambulation 1  Surface: level tile  Device: No Device(pushed IV pole)  Assistance: Stand by assistance  Quality of Gait: slow gait, mildly antalgic  Distance: 150 ft  Comments: Pt reports stiffness at home as well with initial ambulation     Balance  Posture: Good  Sitting - Static: Good  Sitting - Dynamic: Good  Standing - Static: Fair;+  Standing - Dynamic: Fair  Comments: standing balance with single UE support    Seated LE exercise program: Long Arc Quads, hip abduction/adduction, heel/toe raises, and marches. Reps: 10  Pt encouraged to use call light and continue AROm while up to chair to prevent stiffness    Plan   Plan  Times per week: 1-2x/day, 5-6 days a week  Current Treatment Recommendations: Strengthening, Transfer Training, Endurance Training, Balance Training, Gait Training, Stair training, Functional Mobility Training  Safety Devices  Type of devices: Call light within reach, Left in chair, Nurse notified, Gait belt  Restraints  Initially in place: No      AM-PAC Score  AM-PAC Inpatient Mobility Raw Score : 21  AM-PAC Inpatient T-Scale Score : 50.25  Mobility Inpatient CMS 0-100% Score: 28.97  Mobility Inpatient CMS G-Code Modifier : CJ          Goals  Short term goals  Time Frame for Short term goals: 10 visits  Short term goal 1: Pt mod indep with all transfers and bed mobility  Short term goal 2: Pt amb 200 ft with cane mod indep  Short term goal 3: Pt negotiate 12 stairs with 1 rail and supervision  Short term goal 4: Pt improve standing balance to good  Patient Goals   Patient goals :  To feel better       Therapy Time   Individual Concurrent Group Co-treatment   Time In 0915         Time Out 5193         Minutes 23         Timed Code Treatment Minutes: Karyn, PT

## 2019-04-20 NOTE — PLAN OF CARE
Problem: Falls - Risk of:  Goal: Will remain free from falls  Description  Will remain free from falls  4/20/2019 0259 by Tiffani Sepulveda RN  Outcome: Ongoing  Note:   Siderails up x 2  Hourly rounding  Call light in reach  Instructed to call for assist before attempting out of bed. Remains free from falls and accidental injury at this time   Floor free from obstacles  Bed is locked and in lowest position  Adequate lighting provided  Bed alarm on, Red Falling star and Stay with Me signs posted      4/20/2019 0255 by Tiffani Sepulveda RN  Note:   Siderails up x 2  Hourly rounding  Call light in reach  Instructed to call for assist before attempting out of bed. Remains free from falls and accidental injury at this time   Floor free from obstacles  Bed is locked and in lowest position  Adequate lighting provided  Bed alarm on, Red Falling star and Stay with Me signs posted      Goal: Absence of physical injury  Description  Absence of physical injury  Outcome: Ongoing     Problem: Pain:  Goal: Pain level will decrease  Description  Pain level will decrease  4/20/2019 0259 by Tiffani Sepulveda RN  Outcome: Ongoing  Note:   Pain level assessment complete. Patient educated on pain scale and control interventions  PRN pain medication given per patient request  Patient instructed to call out with new onset of pain or unrelieved pain    4/20/2019 0255 by Tiffani Sepulveda RN  Note:   Pain level assessment complete.    Patient educated on pain scale and control interventions  PRN pain medication given per patient request  Patient instructed to call out with new onset of pain or unrelieved pain    4/19/2019 1601 by Lluvia Nazario RN  Outcome: Ongoing  Goal: Control of acute pain  Description  Control of acute pain  4/20/2019 0259 by Tiffani Sepulveda RN  Outcome: Ongoing  4/19/2019 1601 by Lluvia Nazario RN  Outcome: Ongoing  Goal: Control of chronic pain  Description  Control of chronic pain  Outcome: Ongoing Problem: Bowel/Gastric:  Goal: Control of bowel function will improve  Description  Control of bowel function will improve  4/20/2019 0259 by Bradley Mendez RN  Outcome: Ongoing  Note:   Pt states last bowel movement on 04-18-19. Pt has hypoactive bowel sounds and reports minimal flatus. Will continue to assess bowel sounds. IV fluids infusing and pt is NPO.   4/20/2019 0255 by Bradley Mendez RN  Note:   Pt last bowel movement reported as 04/18/19. Pt has hypoactive bowel sounds and reports passing minimal flatus. IV fluids infusing. Will continue to monitor bowel sounds and signs and symptoms of constipation.    Goal: Ability to achieve a regular elimination pattern will improve  Description  Ability to achieve a regular elimination pattern will improve  Outcome: Ongoing

## 2019-04-20 NOTE — PLAN OF CARE
Problem: Pain:  Goal: Pain level will decrease  Description  Pain level will decrease  4/20/2019 0255 by Alba Larson RN  Note:   Pain level assessment complete.    Patient educated on pain scale and control interventions  PRN pain medication given per patient request  Patient instructed to call out with new onset of pain or unrelieved pain    4/19/2019 1601 by Royal Bridges RN  Outcome: Ongoing  Goal: Control of acute pain  Description  Control of acute pain  4/19/2019 1601 by Royal Bridges RN  Outcome: Ongoing

## 2019-04-21 LAB
ABSOLUTE EOS #: 0.18 K/UL (ref 0–0.4)
ABSOLUTE IMMATURE GRANULOCYTE: ABNORMAL K/UL (ref 0–0.3)
ABSOLUTE LYMPH #: 1.77 K/UL (ref 1–4.8)
ABSOLUTE MONO #: 0.32 K/UL (ref 0.2–0.8)
ALBUMIN SERPL-MCNC: 2.9 G/DL (ref 3.5–5.2)
ALBUMIN/GLOBULIN RATIO: ABNORMAL (ref 1–2.5)
ALP BLD-CCNC: 123 U/L (ref 35–104)
ALT SERPL-CCNC: 7 U/L (ref 5–33)
ANION GAP SERPL CALCULATED.3IONS-SCNC: 15 MMOL/L (ref 9–17)
AST SERPL-CCNC: 10 U/L
BASOPHILS # BLD: 0 %
BASOPHILS ABSOLUTE: 0 K/UL (ref 0–0.2)
BILIRUB SERPL-MCNC: 0.21 MG/DL (ref 0.3–1.2)
BILIRUBIN DIRECT: 0.08 MG/DL
BILIRUBIN, INDIRECT: 0.13 MG/DL (ref 0–1)
BUN BLDV-MCNC: 20 MG/DL (ref 8–23)
BUN/CREAT BLD: 19 (ref 9–20)
C-REACTIVE PROTEIN: 44.5 MG/L (ref 0–5)
CALCIUM SERPL-MCNC: 7.9 MG/DL (ref 8.6–10.4)
CHLORIDE BLD-SCNC: 111 MMOL/L (ref 98–107)
CO2: 16 MMOL/L (ref 20–31)
CREAT SERPL-MCNC: 1.06 MG/DL (ref 0.5–0.9)
DIFFERENTIAL TYPE: ABNORMAL
EKG ATRIAL RATE: 88 BPM
EKG P-R INTERVAL: 166 MS
EKG Q-T INTERVAL: 382 MS
EKG QRS DURATION: 84 MS
EKG QTC CALCULATION (BAZETT): 462 MS
EKG R AXIS: -154 DEGREES
EKG T AXIS: -174 DEGREES
EKG VENTRICULAR RATE: 88 BPM
EOSINOPHILS RELATIVE PERCENT: 5 % (ref 1–4)
GFR AFRICAN AMERICAN: >60 ML/MIN
GFR NON-AFRICAN AMERICAN: 51 ML/MIN
GFR SERPL CREATININE-BSD FRML MDRD: ABNORMAL ML/MIN/{1.73_M2}
GFR SERPL CREATININE-BSD FRML MDRD: ABNORMAL ML/MIN/{1.73_M2}
GLOBULIN: ABNORMAL G/DL (ref 1.5–3.8)
GLUCOSE BLD-MCNC: 84 MG/DL (ref 70–99)
HCT VFR BLD CALC: 28.5 % (ref 36–46)
HEMOGLOBIN: 9 G/DL (ref 12–16)
IMMATURE GRANULOCYTES: ABNORMAL %
LACTIC ACID: 0.6 MMOL/L (ref 0.5–2.2)
LYMPHOCYTES # BLD: 51 % (ref 24–44)
MAGNESIUM: 1.3 MG/DL (ref 1.6–2.6)
MCH RBC QN AUTO: 22.7 PG (ref 26–34)
MCHC RBC AUTO-ENTMCNC: 31.7 G/DL (ref 31–37)
MCV RBC AUTO: 71.8 FL (ref 80–100)
MONOCYTES # BLD: 9 % (ref 1–7)
MORPHOLOGY: ABNORMAL
NRBC AUTOMATED: ABNORMAL PER 100 WBC
PDW BLD-RTO: 15.8 % (ref 11.5–14.5)
PHOSPHORUS: 3.8 MG/DL (ref 2.6–4.5)
PLATELET # BLD: 153 K/UL (ref 130–400)
PLATELET ESTIMATE: ABNORMAL
PMV BLD AUTO: 10.3 FL (ref 6–12)
POTASSIUM SERPL-SCNC: 3.9 MMOL/L (ref 3.7–5.3)
RBC # BLD: 3.96 M/UL (ref 4–5.2)
RBC # BLD: ABNORMAL 10*6/UL
SEDIMENTATION RATE, ERYTHROCYTE: 30 MM (ref 0–20)
SEG NEUTROPHILS: 35 % (ref 36–66)
SEGMENTED NEUTROPHILS ABSOLUTE COUNT: 1.23 K/UL (ref 1.8–7.7)
SODIUM BLD-SCNC: 142 MMOL/L (ref 135–144)
TOTAL PROTEIN: 6.6 G/DL (ref 6.4–8.3)
WBC # BLD: 3.5 K/UL (ref 3.5–11)
WBC # BLD: ABNORMAL 10*3/UL

## 2019-04-21 PROCEDURE — 86140 C-REACTIVE PROTEIN: CPT

## 2019-04-21 PROCEDURE — 2580000003 HC RX 258: Performed by: INTERNAL MEDICINE

## 2019-04-21 PROCEDURE — 80048 BASIC METABOLIC PNL TOTAL CA: CPT

## 2019-04-21 PROCEDURE — 93005 ELECTROCARDIOGRAM TRACING: CPT

## 2019-04-21 PROCEDURE — 2500000003 HC RX 250 WO HCPCS: Performed by: FAMILY MEDICINE

## 2019-04-21 PROCEDURE — 6370000000 HC RX 637 (ALT 250 FOR IP): Performed by: FAMILY MEDICINE

## 2019-04-21 PROCEDURE — 83735 ASSAY OF MAGNESIUM: CPT

## 2019-04-21 PROCEDURE — 84100 ASSAY OF PHOSPHORUS: CPT

## 2019-04-21 PROCEDURE — 6360000002 HC RX W HCPCS: Performed by: FAMILY MEDICINE

## 2019-04-21 PROCEDURE — 36415 COLL VENOUS BLD VENIPUNCTURE: CPT

## 2019-04-21 PROCEDURE — 80197 ASSAY OF TACROLIMUS: CPT

## 2019-04-21 PROCEDURE — 1200000000 HC SEMI PRIVATE

## 2019-04-21 PROCEDURE — 85651 RBC SED RATE NONAUTOMATED: CPT

## 2019-04-21 PROCEDURE — 80076 HEPATIC FUNCTION PANEL: CPT

## 2019-04-21 PROCEDURE — 83605 ASSAY OF LACTIC ACID: CPT

## 2019-04-21 PROCEDURE — 85025 COMPLETE CBC W/AUTO DIFF WBC: CPT

## 2019-04-21 PROCEDURE — 6360000002 HC RX W HCPCS: Performed by: NURSE PRACTITIONER

## 2019-04-21 PROCEDURE — APPNB30 APP NON BILLABLE TIME 0-30 MINS: Performed by: NURSE PRACTITIONER

## 2019-04-21 PROCEDURE — 99232 SBSQ HOSP IP/OBS MODERATE 35: CPT | Performed by: INTERNAL MEDICINE

## 2019-04-21 RX ORDER — METOPROLOL TARTRATE 5 MG/5ML
5 INJECTION INTRAVENOUS EVERY 6 HOURS
Status: DISCONTINUED | OUTPATIENT
Start: 2019-04-21 | End: 2019-04-26 | Stop reason: HOSPADM

## 2019-04-21 RX ORDER — MAGNESIUM SULFATE 1 G/100ML
1 INJECTION INTRAVENOUS
Status: COMPLETED | OUTPATIENT
Start: 2019-04-21 | End: 2019-04-21

## 2019-04-21 RX ORDER — SODIUM CHLORIDE 450 MG/100ML
INJECTION, SOLUTION INTRAVENOUS CONTINUOUS
Status: DISCONTINUED | OUTPATIENT
Start: 2019-04-21 | End: 2019-04-23

## 2019-04-21 RX ADMIN — HEPARIN SODIUM 5000 UNITS: 5000 INJECTION, SOLUTION INTRAVENOUS; SUBCUTANEOUS at 21:12

## 2019-04-21 RX ADMIN — HYDROCORTISONE SODIUM SUCCINATE 20 MG: 100 INJECTION, POWDER, FOR SOLUTION INTRAMUSCULAR; INTRAVENOUS at 08:29

## 2019-04-21 RX ADMIN — TACROLIMUS 1 MG: 1 CAPSULE ORAL at 21:13

## 2019-04-21 RX ADMIN — BENZOCAINE AND MENTHOL 1 LOZENGE: 15; 3.6 LOZENGE ORAL at 23:23

## 2019-04-21 RX ADMIN — ONDANSETRON 4 MG: 2 INJECTION INTRAMUSCULAR; INTRAVENOUS at 17:38

## 2019-04-21 RX ADMIN — SODIUM CHLORIDE: 4.5 INJECTION, SOLUTION INTRAVENOUS at 15:27

## 2019-04-21 RX ADMIN — CHLORASEPTIC 1 SPRAY: 1.5 LIQUID ORAL at 20:47

## 2019-04-21 RX ADMIN — METOPROLOL TARTRATE 5 MG: 5 INJECTION INTRAVENOUS at 21:12

## 2019-04-21 RX ADMIN — MAGNESIUM SULFATE HEPTAHYDRATE 1 G: 1 INJECTION, SOLUTION INTRAVENOUS at 11:40

## 2019-04-21 RX ADMIN — SODIUM CHLORIDE: 9 INJECTION, SOLUTION INTRAVENOUS at 05:37

## 2019-04-21 RX ADMIN — HEPARIN SODIUM 5000 UNITS: 5000 INJECTION, SOLUTION INTRAVENOUS; SUBCUTANEOUS at 08:29

## 2019-04-21 RX ADMIN — METOPROLOL TARTRATE 5 MG: 5 INJECTION INTRAVENOUS at 08:29

## 2019-04-21 RX ADMIN — METOPROLOL TARTRATE 5 MG: 5 INJECTION INTRAVENOUS at 16:04

## 2019-04-21 RX ADMIN — CHLORASEPTIC 1 SPRAY: 1.5 LIQUID ORAL at 00:07

## 2019-04-21 RX ADMIN — FAMOTIDINE 20 MG: 10 INJECTION, SOLUTION INTRAVENOUS at 08:30

## 2019-04-21 RX ADMIN — ONDANSETRON 4 MG: 2 INJECTION INTRAMUSCULAR; INTRAVENOUS at 00:07

## 2019-04-21 RX ADMIN — MAGNESIUM SULFATE HEPTAHYDRATE 1 G: 1 INJECTION, SOLUTION INTRAVENOUS at 08:29

## 2019-04-21 RX ADMIN — BENZOCAINE AND MENTHOL 1 LOZENGE: 15; 3.6 LOZENGE ORAL at 19:39

## 2019-04-21 RX ADMIN — TACROLIMUS 1 MG: 1 CAPSULE ORAL at 08:30

## 2019-04-21 RX ADMIN — SODIUM CHLORIDE: 4.5 INJECTION, SOLUTION INTRAVENOUS at 23:23

## 2019-04-21 ASSESSMENT — PAIN SCALES - GENERAL
PAINLEVEL_OUTOF10: 5
PAINLEVEL_OUTOF10: 4
PAINLEVEL_OUTOF10: 5
PAINLEVEL_OUTOF10: 4

## 2019-04-21 ASSESSMENT — PAIN DESCRIPTION - PROGRESSION

## 2019-04-21 ASSESSMENT — PAIN DESCRIPTION - ONSET: ONSET: ON-GOING

## 2019-04-21 ASSESSMENT — PAIN DESCRIPTION - FREQUENCY: FREQUENCY: INTERMITTENT

## 2019-04-21 ASSESSMENT — ENCOUNTER SYMPTOMS
COUGH: 0
DIARRHEA: 0
SHORTNESS OF BREATH: 0

## 2019-04-21 ASSESSMENT — PAIN DESCRIPTION - LOCATION: LOCATION: ABDOMEN

## 2019-04-21 ASSESSMENT — PAIN DESCRIPTION - ORIENTATION: ORIENTATION: RIGHT;UPPER

## 2019-04-21 ASSESSMENT — PAIN DESCRIPTION - PAIN TYPE: TYPE: ACUTE PAIN

## 2019-04-21 ASSESSMENT — PAIN - FUNCTIONAL ASSESSMENT: PAIN_FUNCTIONAL_ASSESSMENT: PREVENTS OR INTERFERES SOME ACTIVE ACTIVITIES AND ADLS

## 2019-04-21 ASSESSMENT — PAIN DESCRIPTION - DESCRIPTORS: DESCRIPTORS: CRAMPING

## 2019-04-21 NOTE — PROGRESS NOTES
PROT 7.7 7.4 6.6   BILITOT 0.22* 0.28* 0.21*   BILIDIR <0.08 <0.08 0.08   LABALBU 3.5 3.4* 2.9*       AMYLASE/LIPASE/AMMONIA  Recent Labs     04/19/19  0710   LIPASE 9*       PT/INR  Recent Labs     04/20/19  0457   PROTIME 11.3   INR 1.1     FINDINGS:ct abd 4/19/19   Lower Chest: Dependent lung changes.       Organs: Evaluation of the solid organs is limited without intravenous   contrast. Postsurgical changes within the upper abdomen from history of liver   transplant.  No liver lesion.  Mild pneumobilia.  Cholecystectomy. Pancreatic atrophy.  No definite pancreatic lesion.  No splenomegaly.  No   adrenal lesion.  No hydronephrosis.  Bilateral renal atrophy.  No renal   calculus.  Multiple left renal cysts.       GI/Bowel: Small bowel anastomosis.  Mildly dilated loop of small bowel   proximal to the anastomosis.  No acute inflammatory process.       Pelvis: No free fluid.  No bladder calculus.       Peritoneum/Retroperitoneum: Atherosclerotic calcification of the abdominal   aorta without aneurysmal dilatation.  No adenopathy.       Bones/Soft Tissues: Postsurgical changes along the lower anterior abdominal   wall.  No acute abnormality.  Diffuse osteopenia.  Lumbar spine degenerative   changes.           Impression   Mildly dilated loop of small bowel proximal to the anastomosis.  Findings may   be on the basis of delayed transit versus mild partial small bowel   obstruction.  No high-grade obstruction. Xr Abdomen (kub) (single Ap View)     Result Date: 4/20/2019  EXAMINATION: SINGLE SUPINE XRAY VIEW(S) OF THE ABDOMEN 4/20/2019 7:32 am COMPARISON: April 19, 2019 HISTORY: ORDERING SYSTEM PROVIDED HISTORY: SBO TECHNOLOGIST PROVIDED HISTORY: SBO Acuity: Acute Type of Exam: Unknown FINDINGS: Enteric tube with the tip within the stomach. Moderate stool volume. No abnormally dilated loops of small bowel.      No abnormally dilated loops of small bowel.            ASSESSMENT/PLAN:  1.  Abdominal pain with SBO with hx of UC  -will give one 500ml tap water enema today  -continue the solumedrol daily  -if she is improved after the enema the NG tube may be removed from a GI standpoint  -pain management per primary service  -recheck KUB in am      This plan was formulated in collaboration with Dr. Stanislav Matias.     Electronically signed by: MILAGRO Trujillo CNP on 4/21/2019 at 4:25 PM

## 2019-04-21 NOTE — PROGRESS NOTES
bowel sounds x 4 quadrants. Musculoskeletal: Active ROM x 4 extremities. No cyanosis or clubbing. Integumentary: Pink, warm and dry. Free from rash or lesions. Skin turgor normal.  CNS: Oriented to person, place and time. Speech clear. Face symmetrical. No tremor. Data:  CBC:   Lab Results   Component Value Date    WBC 3.5 04/21/2019    HGB 9.0 (L) 04/21/2019    HCT 28.5 (L) 04/21/2019    MCV 71.8 (L) 04/21/2019     04/21/2019     BMP:    Lab Results   Component Value Date     04/21/2019    K 3.9 04/21/2019     (H) 04/21/2019    CO2 16 (L) 04/21/2019    BUN 20 04/21/2019    CREATININE 1.06 (H) 04/21/2019    GLUCOSE 84 04/21/2019     CMP:   Lab Results   Component Value Date     04/21/2019    K 3.9 04/21/2019     (H) 04/21/2019    CO2 16 (L) 04/21/2019    BUN 20 04/21/2019    CREATININE 1.06 (H) 04/21/2019    GLUCOSE 84 04/21/2019    CALCIUM 7.9 (L) 04/21/2019    PROT 6.6 04/21/2019    LABALBU 2.9 (L) 04/21/2019    BILITOT 0.21 (L) 04/21/2019    ALKPHOS 123 (H) 04/21/2019    AST 10 04/21/2019    ALT 7 04/21/2019      Hepatic:   Lab Results   Component Value Date    AST 10 04/21/2019    ALT 7 04/21/2019    BILITOT 0.21 (L) 04/21/2019    ALKPHOS 123 (H) 04/21/2019     BNP: No results found for: BNP  Lipids: No results found for: CHOL, HDL  INR:   Lab Results   Component Value Date    INR 1.1 04/20/2019     PTH: No results found for: PTH  Phosphorus:    Lab Results   Component Value Date    PHOS 3.8 04/21/2019     Ionized Calcium: No results found for: IONCA  Magnesium:   Lab Results   Component Value Date    MG 1.3 (L) 04/21/2019     Albumin:   Lab Results   Component Value Date    LABALBU 2.9 (L) 04/21/2019     Last 3 CK, CKMB, Troponin: @LABRCNT(CKTOTAL:3,CKMB:3,TROPONINI:3)       URINE:)No results found for: Stan Izaguirre    Radiology:   Reviewed. Assessment:  1. Acute Kidney Injury-prerenal. Cr is improving. 2. CKD stage 3  3. Hypomagnesemia  4. Metabolic acidosis  5.  Liver txp on prograf  6. SBO vs delayed emptying. 7. Ulcerative colitis  8. CT showed bilateral renal atrophy with multiple left renal cysts. Plan: Will change IVF to 1/2 NS at 100 ml/hr as Na level is rising. Monitor BP. On sublingual Prograf dosing per Samantha Olivia recommendations. Magnesium repleted. Avoid hypotension, nephrotoxic drugs, Lovenox, Fleets enema and IV contrast exposure. Follow up labs ordered for AM.     Please do not hesitate to call with questions. We will follow with you. Electronically signed by Niyah Jauregui MD  on 4/21/2019 at 9:20 AM  [facility].   Ph: [phone]

## 2019-04-21 NOTE — PLAN OF CARE
BALANCE EDUCATION  Description  Educate patients on maintaining dynamic/static standing/sitting balance, with/without upper extremity support. Outcome: Ongoing  Note:   Pt using front wheel walker while inpatient. Pt does not use assistive devices at home but is using in hospital for support when feeling weak and or unstable.

## 2019-04-21 NOTE — PROGRESS NOTES
General Surgery Progress Note            PATIENT NAME: Day Milton     TODAY'S DATE: 4/21/2019, 8:33 AM    SUBJECTIVE/OBJECTIVE:      VITALS:  BP (!) 150/73   Pulse 70   Temp 98.1 °F (36.7 °C) (Oral)   Resp 16   Ht 5' 7\" (1.702 m)   Wt 184 lb 1.6 oz (83.5 kg)   SpO2 100%   BMI 28.83 kg/m²      Seen and examined  Denies N/V  C/O sore throat  Awake. A&O X 3. On-toxic  RRR  LCTAB  Abdomen soft, ND, NT. Hypoactive BS. NGT output minimal      INTAKE/OUTPUT:      Intake/Output Summary (Last 24 hours) at 4/21/2019 0833  Last data filed at 4/21/2019 0539  Gross per 24 hour   Intake 4023 ml   Output 1850 ml   Net 2173 ml       CBC with Differential:    Lab Results   Component Value Date    WBC 3.5 04/21/2019    RBC 3.96 04/21/2019    HGB 9.0 04/21/2019    HCT 28.5 04/21/2019     04/21/2019    MCV 71.8 04/21/2019    MCH 22.7 04/21/2019    MCHC 31.7 04/21/2019    RDW 15.8 04/21/2019    LYMPHOPCT PENDING 04/21/2019    MONOPCT PENDING 04/21/2019    BASOPCT PENDING 04/21/2019    MONOSABS PENDING 04/21/2019    LYMPHSABS PENDING 04/21/2019    EOSABS PENDING 04/21/2019    BASOSABS PENDING 04/21/2019    DIFFTYPE NOT REPORTED 04/21/2019     BMP:    Lab Results   Component Value Date     04/21/2019    K 3.9 04/21/2019     04/21/2019    CO2 16 04/21/2019    BUN 20 04/21/2019    LABALBU 2.9 04/21/2019    CREATININE 1.06 04/21/2019    CALCIUM 7.9 04/21/2019    GFRAA >60 04/21/2019    LABGLOM 51 04/21/2019    GLUCOSE 84 04/21/2019           ASSESSMENT/PLAN:    Principal Problem:    Ulcerative colitis ([address]  Active Problems:    SBO (small bowel obstruction) ([facility] [address]    Liver transplant recipient [facility])    Benign essential HTN    Sickle cell trait (HCC)    KAYLA (acute kidney injury) ([facility] [address]    Stage 3 chronic kidney disease (HCC)    Elevated LFTs  Resolved Problems:    * No resolved hospital problems.  *    Partial SBO vs ileus  UC, possible flare up  Steroids started by GI  Will check KUB in AM

## 2019-04-21 NOTE — PROGRESS NOTES
 Morphine Other (See Comments)     Patient becomes very lethargic and BP drops     Moxifloxacin Rash     Principal Problem:    Ulcerative colitis ([facility] [address]  Active Problems:    SBO (small bowel obstruction) (HCC)    Liver transplant recipient [facility])    Benign essential HTN    Sickle cell trait (HCC)    [facility]AYLA ([facility]) [facility]    Stage 3 chronic kidney disease (HCC)    Elevated LFTs  Resolved Problems:    * No resolved hospital problems. *    Blood pressure 129/82, pulse 72, temperature 97.9 °F (36.6 °C), temperature source Oral, resp. rate 16, height 5' 7\" (1.702 m), weight 184 lb 1.6 oz (83.5 kg), SpO2 100 %. Subjective:  Symptoms:  Improved. She reports weakness and anorexia. No shortness of breath, malaise, cough, chest pain, headache, chest pressure, diarrhea or anxiety. Diet:  NPO. Activity level: Impaired due to weakness. Pain:  She complains of pain that is mild. She reports pain is improving. Pain is well controlled. Objective:  General Appearance:  Comfortable, ill-appearing, in no acute distress and not in pain. Vital signs: (most recent): Blood pressure 129/82, pulse 72, temperature 97.9 °F (36.6 °C), temperature source Oral, resp. rate 16, height 5' 7\" (1.702 m), weight 184 lb 1.6 oz (83.5 kg), SpO2 100 %. Output: Producing urine and no stool output. HEENT: Normal HEENT exam.    Lungs:  Normal effort and normal respiratory rate. Breath sounds clear to auscultation. She is not in respiratory distress. No stridor. There are decreased breath sounds. No rales. Heart: Normal rate. Regular rhythm. S1 normal and S2 normal.    Chest: No chest wall tenderness. Abdomen: Abdomen is soft and non-distended. Hypoactive bowel sounds. There is right lower quadrant and left lower quadrant tenderness. There is no rebound tenderness. There is no guarding. There is no splenomegaly. There is no hepatomegaly. Extremities: Decreased range of motion.   There is no local swelling. Neurological: Patient is alert and oriented to person, place and time. Pupils:  Pupils are equal, round, and reactive to light. Skin:  Warm and dry. C-Reactive Protein [[id number]] (Abnormal) Collected: 04/21/19 0520   Updated: 04/21/19 1157    Specimen Source: Blood     CRP 44. 5High  mg/L   CBC Auto Differential [[id number]] (Abnormal) Collected: 04/21/19 0520   Updated: 04/21/19 0851    Specimen Source: Blood     WBC 3.5 k/uL    RBC 3.96Low  m/uL    Hemoglobin 9.0Low  g/dL    Hematocrit 28.5Low  %    MCV 71.8Low  fL    MCH 22.7Low  pg    MCHC 31.7 g/dL    RDW 15.8High  %    Platelets 216 k/uL    MPV 10.3 fL    NRBC Automated NOT REPORTED per 100 WBC    Differential Type NOT REPORTED    Immature Granulocytes NOT REPORTED %    Absolute Immature Granulocyte NOT REPORTED k/uL    WBC Morphology NOT REPORTED    RBC Morphology NOT REPORTED    Platelet Estimate NOT REPORTED    Seg Neutrophils 35Low  %    Lymphocytes 51High  %    Monocytes 9High  %    Eosinophils % 5High  %    Basophils 0 %    Segs Absolute 1.23Low  k/uL    Absolute Lymph # 1.77 k/uL    Absolute Mono # 0.32 k/uL    Absolute Eos # 0.18 k/uL    Basophils # 0.00 k/uL    Morphology HYPOCHROMIA PRESENT   Sedimentation Rate [[id number]] (Abnormal) Collected: 04/21/19 0520   Updated: 04/21/19 0732    Specimen Type: Blood     Sed Rate 30High  mm   Lactic Acid [[id number]] Collected: 04/21/19 0520   Updated: 04/21/19 0632     Lactic Acid 0.6 mmol/L   Liver Profile [[id number]] (Abnormal) Collected: 04/21/19 0520   Updated: 04/21/19 0629    Specimen Source: Blood     Alb 2.9Low  g/dL    Alkaline Phosphatase 123High  U/L    ALT 7 U/L    AST 10 U/L    Total Bilirubin 0.21Low  mg/dL    Bilirubin, Direct 0.08 mg/dL    Bilirubin, Indirect 0.13 mg/dL    Total Protein 6.6 g/dL    Globulin NOT REPORTED g/dL    Albumin/Globulin Ratio NOT REPORTED   Magnesium [[id number]] (Abnormal) Collected: 04/21/19 0520   Updated: 04/21/19 4848     Magnesium 1.3Low mg/dL   Phosphorus [[id number]] Collected: 04/21/19 0520   Updated: 04/21/19 0629    Specimen Source: Blood     Phosphorus 3.8 mg/dL   Basic Metabolic Panel [[id number]] (Abnormal) Collected: 04/21/19 0520   Updated: 04/21/19 0879    Specimen Source: Blood     Glucose 84 mg/dL    BUN 20 mg/dL    CREATININE 1. 06High  mg/dL    Bun/Cre Ratio 19    Calcium 7.9Low  mg/dL    Sodium 142 mmol/L    Potassium 3.9 mmol/L    Chloride 111High  mmol/L    CO2 16Low  mmol/L    Anion Gap 15 mmol/L    GFR Non-African American 51Low  mL/min    GFR African American >60 mL/min       Assessment:    Condition: In serious condition. Unchanged. (Patient Active Problem List:     SBO (small bowel obstruction) (HCC)     Ulcerative colitis ([facility] [address]     Liver transplant recipient [facility])     Benign essential HTN     Sickle cell trait (HCC)     KAYLA (acute kidney injury) ([facility] [address]     Stage 3 chronic kidney disease (HCC)     Elevated LFTs    ). Plan:   (Ac SBO improved  However no BM  No flatus todayeither  stil has NG  GI rec DC only if enema works which It hasn't  Ac Exac UC  On steroids  KAYLA improved sig  HTN stbale  Pain control adequate  C/o sore throat  Start Cepacol prn  DC LFTs, Sed rate  Low Mag  Replaced IV today).        Lucy Seaman MD  4/21/2019

## 2019-04-22 ENCOUNTER — APPOINTMENT (OUTPATIENT)
Dept: GENERAL RADIOLOGY | Age: 69
DRG: 385 | End: 2019-04-22
Payer: MEDICARE

## 2019-04-22 PROBLEM — E43 SEVERE MALNUTRITION (HCC): Chronic | Status: ACTIVE | Noted: 2019-04-22

## 2019-04-22 LAB
ABSOLUTE EOS #: 0.6 K/UL (ref 0–0.4)
ABSOLUTE IMMATURE GRANULOCYTE: ABNORMAL K/UL (ref 0–0.3)
ABSOLUTE LYMPH #: 1.4 K/UL (ref 1–4.8)
ABSOLUTE MONO #: 0.3 K/UL (ref 0.2–0.8)
ANION GAP SERPL CALCULATED.3IONS-SCNC: 16 MMOL/L (ref 9–17)
BASOPHILS # BLD: 0 % (ref 0–2)
BASOPHILS ABSOLUTE: 0 K/UL (ref 0–0.2)
BUN BLDV-MCNC: 15 MG/DL (ref 8–23)
BUN/CREAT BLD: 16 (ref 9–20)
C-REACTIVE PROTEIN: 30.4 MG/L (ref 0–5)
CALCIUM IONIZED: 1.24 MMOL/L (ref 1.13–1.33)
CALCIUM SERPL-MCNC: 8.4 MG/DL (ref 8.6–10.4)
CHLORIDE BLD-SCNC: 105 MMOL/L (ref 98–107)
CO2: 17 MMOL/L (ref 20–31)
CREAT SERPL-MCNC: 0.96 MG/DL (ref 0.5–0.9)
DIFFERENTIAL TYPE: ABNORMAL
EKG ATRIAL RATE: 73 BPM
EKG P AXIS: 32 DEGREES
EKG P-R INTERVAL: 160 MS
EKG Q-T INTERVAL: 414 MS
EKG QRS DURATION: 86 MS
EKG QTC CALCULATION (BAZETT): 456 MS
EKG R AXIS: -25 DEGREES
EKG T AXIS: -10 DEGREES
EKG VENTRICULAR RATE: 73 BPM
EOSINOPHILS RELATIVE PERCENT: 14 % (ref 1–4)
GFR AFRICAN AMERICAN: >60 ML/MIN
GFR NON-AFRICAN AMERICAN: 57 ML/MIN
GFR SERPL CREATININE-BSD FRML MDRD: ABNORMAL ML/MIN/{1.73_M2}
GFR SERPL CREATININE-BSD FRML MDRD: ABNORMAL ML/MIN/{1.73_M2}
GLUCOSE BLD-MCNC: 74 MG/DL (ref 70–99)
HCT VFR BLD CALC: 29.3 % (ref 36–46)
HEMOGLOBIN: 9.5 G/DL (ref 12–16)
IMMATURE GRANULOCYTES: ABNORMAL %
LYMPHOCYTES # BLD: 30 % (ref 24–44)
MAGNESIUM: 1.5 MG/DL (ref 1.6–2.6)
MAGNESIUM: 1.5 MG/DL (ref 1.6–2.6)
MCH RBC QN AUTO: 23 PG (ref 26–34)
MCHC RBC AUTO-ENTMCNC: 32.4 G/DL (ref 31–37)
MCV RBC AUTO: 71.1 FL (ref 80–100)
MONOCYTES # BLD: 8 % (ref 1–7)
NRBC AUTOMATED: ABNORMAL PER 100 WBC
PDW BLD-RTO: 15.8 % (ref 11.5–14.5)
PHOSPHORUS: 2.6 MG/DL (ref 2.6–4.5)
PLATELET # BLD: 173 K/UL (ref 130–400)
PLATELET ESTIMATE: ABNORMAL
PMV BLD AUTO: 9.8 FL (ref 6–12)
POTASSIUM SERPL-SCNC: 3.5 MMOL/L (ref 3.7–5.3)
PROGRAF: 2.3 NG/ML (ref 5–20)
RBC # BLD: 4.12 M/UL (ref 4–5.2)
RBC # BLD: ABNORMAL 10*6/UL
SEG NEUTROPHILS: 48 % (ref 36–66)
SEGMENTED NEUTROPHILS ABSOLUTE COUNT: 2.1 K/UL (ref 1.8–7.7)
SODIUM BLD-SCNC: 138 MMOL/L (ref 135–144)
T4 TOTAL: 7.5 UG/DL (ref 4.5–12)
TSH SERPL DL<=0.05 MIU/L-ACNC: 0.72 MIU/L (ref 0.3–5)
WBC # BLD: 4.5 K/UL (ref 3.5–11)
WBC # BLD: ABNORMAL 10*3/UL

## 2019-04-22 PROCEDURE — 2580000003 HC RX 258: Performed by: FAMILY MEDICINE

## 2019-04-22 PROCEDURE — 97116 GAIT TRAINING THERAPY: CPT

## 2019-04-22 PROCEDURE — 74018 RADEX ABDOMEN 1 VIEW: CPT

## 2019-04-22 PROCEDURE — 84436 ASSAY OF TOTAL THYROXINE: CPT

## 2019-04-22 PROCEDURE — 94640 AIRWAY INHALATION TREATMENT: CPT

## 2019-04-22 PROCEDURE — 97530 THERAPEUTIC ACTIVITIES: CPT | Performed by: NURSE PRACTITIONER

## 2019-04-22 PROCEDURE — 2500000003 HC RX 250 WO HCPCS: Performed by: FAMILY MEDICINE

## 2019-04-22 PROCEDURE — 1200000000 HC SEMI PRIVATE

## 2019-04-22 PROCEDURE — 36415 COLL VENOUS BLD VENIPUNCTURE: CPT

## 2019-04-22 PROCEDURE — 97530 THERAPEUTIC ACTIVITIES: CPT

## 2019-04-22 PROCEDURE — 6360000002 HC RX W HCPCS: Performed by: INTERNAL MEDICINE

## 2019-04-22 PROCEDURE — 6370000000 HC RX 637 (ALT 250 FOR IP): Performed by: FAMILY MEDICINE

## 2019-04-22 PROCEDURE — 6360000002 HC RX W HCPCS: Performed by: FAMILY MEDICINE

## 2019-04-22 PROCEDURE — 86140 C-REACTIVE PROTEIN: CPT

## 2019-04-22 PROCEDURE — 84443 ASSAY THYROID STIM HORMONE: CPT

## 2019-04-22 PROCEDURE — 84100 ASSAY OF PHOSPHORUS: CPT

## 2019-04-22 PROCEDURE — 85025 COMPLETE CBC W/AUTO DIFF WBC: CPT

## 2019-04-22 PROCEDURE — 80048 BASIC METABOLIC PNL TOTAL CA: CPT

## 2019-04-22 PROCEDURE — 99232 SBSQ HOSP IP/OBS MODERATE 35: CPT | Performed by: INTERNAL MEDICINE

## 2019-04-22 PROCEDURE — 0CJS8ZZ INSPECTION OF LARYNX, VIA NATURAL OR ARTIFICIAL OPENING ENDOSCOPIC: ICD-10-PCS | Performed by: OTOLARYNGOLOGY

## 2019-04-22 PROCEDURE — 2580000003 HC RX 258: Performed by: INTERNAL MEDICINE

## 2019-04-22 PROCEDURE — 97110 THERAPEUTIC EXERCISES: CPT | Performed by: NURSE PRACTITIONER

## 2019-04-22 PROCEDURE — 83735 ASSAY OF MAGNESIUM: CPT

## 2019-04-22 PROCEDURE — 82330 ASSAY OF CALCIUM: CPT

## 2019-04-22 PROCEDURE — 6360000002 HC RX W HCPCS: Performed by: NURSE PRACTITIONER

## 2019-04-22 RX ORDER — IPRATROPIUM BROMIDE AND ALBUTEROL SULFATE 2.5; .5 MG/3ML; MG/3ML
1 SOLUTION RESPIRATORY (INHALATION)
Status: DISCONTINUED | OUTPATIENT
Start: 2019-04-22 | End: 2019-04-24

## 2019-04-22 RX ORDER — MAGNESIUM SULFATE 1 G/100ML
1 INJECTION INTRAVENOUS
Status: COMPLETED | OUTPATIENT
Start: 2019-04-22 | End: 2019-04-22

## 2019-04-22 RX ORDER — TACROLIMUS 1 MG/1
1 CAPSULE ORAL EVERY MORNING
Status: DISCONTINUED | OUTPATIENT
Start: 2019-04-23 | End: 2019-04-24

## 2019-04-22 RX ORDER — TACROLIMUS 1 MG/1
2 CAPSULE ORAL NIGHTLY
Status: DISCONTINUED | OUTPATIENT
Start: 2019-04-22 | End: 2019-04-26 | Stop reason: HOSPADM

## 2019-04-22 RX ADMIN — BENZOCAINE AND MENTHOL 1 LOZENGE: 15; 3.6 LOZENGE ORAL at 05:34

## 2019-04-22 RX ADMIN — MAGNESIUM SULFATE HEPTAHYDRATE 1 G: 1 INJECTION, SOLUTION INTRAVENOUS at 21:47

## 2019-04-22 RX ADMIN — HEPARIN SODIUM 5000 UNITS: 5000 INJECTION, SOLUTION INTRAVENOUS; SUBCUTANEOUS at 09:52

## 2019-04-22 RX ADMIN — CHLORASEPTIC 1 SPRAY: 1.5 LIQUID ORAL at 00:45

## 2019-04-22 RX ADMIN — FAMOTIDINE 20 MG: 10 INJECTION, SOLUTION INTRAVENOUS at 09:53

## 2019-04-22 RX ADMIN — METOPROLOL TARTRATE 5 MG: 5 INJECTION INTRAVENOUS at 03:27

## 2019-04-22 RX ADMIN — MAGNESIUM SULFATE HEPTAHYDRATE 1 G: 1 INJECTION, SOLUTION INTRAVENOUS at 22:53

## 2019-04-22 RX ADMIN — IPRATROPIUM BROMIDE AND ALBUTEROL SULFATE 1 AMPULE: .5; 3 SOLUTION RESPIRATORY (INHALATION) at 12:04

## 2019-04-22 RX ADMIN — BENZOCAINE AND MENTHOL 1 LOZENGE: 15; 3.6 LOZENGE ORAL at 10:24

## 2019-04-22 RX ADMIN — HYDROCORTISONE SODIUM SUCCINATE 20 MG: 100 INJECTION, POWDER, FOR SOLUTION INTRAMUSCULAR; INTRAVENOUS at 09:52

## 2019-04-22 RX ADMIN — HEPARIN SODIUM 5000 UNITS: 5000 INJECTION, SOLUTION INTRAVENOUS; SUBCUTANEOUS at 21:45

## 2019-04-22 RX ADMIN — SODIUM CHLORIDE: 4.5 INJECTION, SOLUTION INTRAVENOUS at 13:32

## 2019-04-22 RX ADMIN — IPRATROPIUM BROMIDE AND ALBUTEROL SULFATE 1 AMPULE: .5; 3 SOLUTION RESPIRATORY (INHALATION) at 20:54

## 2019-04-22 RX ADMIN — CEFTRIAXONE SODIUM 1 G: 1 INJECTION, POWDER, FOR SOLUTION INTRAMUSCULAR; INTRAVENOUS at 13:33

## 2019-04-22 RX ADMIN — TACROLIMUS 1 MG: 1 CAPSULE ORAL at 09:46

## 2019-04-22 RX ADMIN — HYDROCORTISONE SODIUM SUCCINATE 50 MG: 100 INJECTION, POWDER, FOR SOLUTION INTRAMUSCULAR; INTRAVENOUS at 18:51

## 2019-04-22 RX ADMIN — TACROLIMUS 2 MG: 1 CAPSULE ORAL at 21:46

## 2019-04-22 RX ADMIN — BENZOCAINE AND MENTHOL 1 LOZENGE: 15; 3.6 LOZENGE ORAL at 03:33

## 2019-04-22 RX ADMIN — METOPROLOL TARTRATE 5 MG: 5 INJECTION INTRAVENOUS at 09:52

## 2019-04-22 RX ADMIN — BENZOCAINE AND MENTHOL 1 LOZENGE: 15; 3.6 LOZENGE ORAL at 22:59

## 2019-04-22 RX ADMIN — METOPROLOL TARTRATE 5 MG: 5 INJECTION INTRAVENOUS at 21:45

## 2019-04-22 RX ADMIN — IPRATROPIUM BROMIDE AND ALBUTEROL SULFATE 1 AMPULE: .5; 3 SOLUTION RESPIRATORY (INHALATION) at 15:18

## 2019-04-22 RX ADMIN — METOPROLOL TARTRATE 5 MG: 5 INJECTION INTRAVENOUS at 16:12

## 2019-04-22 ASSESSMENT — ENCOUNTER SYMPTOMS
COUGH: 0
SHORTNESS OF BREATH: 0
DIARRHEA: 0

## 2019-04-22 ASSESSMENT — PAIN SCALES - GENERAL
PAINLEVEL_OUTOF10: 0

## 2019-04-22 NOTE — PROGRESS NOTES
HYDROmorphone, phenol    Allergies   Allergen Reactions    Codeine Other (See Comments)     Patient becomes very lethargic and BP drops     Compazine [Prochlorperazine] Other (See Comments)     Lock jaw     Morphine Other (See Comments)     Patient becomes very lethargic and BP drops     Moxifloxacin Rash       Physical Exam:  Blood pressure (!) 147/68, pulse 79, temperature 98.8 °F (37.1 °C), resp. rate 18, height 5' 7\" (1.702 m), weight 178 lb 14.4 oz (81.1 kg), SpO2 96 %. In: 800.6 [I.V.:800.6]  Out: 1125   In: 800.6   Out: 1125 [Urine:800]  CONSTITUTIONAL:  awake, alert, cooperative, no apparent distress, and appears stated age  General:  Awake, alert, not in distress. Appears to be stated age. HEENT: Atraumatic, normocephalic. Anicteric sclera. Pink and moist oral mucosa. No carotid bruit. No JVD. Chest: Bilateral air entry, clear to auscultation, no wheezing, rhonchi or rales. Cardiovascular: RRR, S1S2, no murmur, rub or gallop. No lower extremity edema. Abdomen: Soft, non tender to palpation. Active bowel sounds x 4 quadrants. Musculoskeletal: Active ROM x 4 extremities. No cyanosis or clubbing. Integumentary: Pink, warm and dry. Free from rash or lesions. Skin turgor normal.  CNS: Oriented to person, place and time. Speech clear. Face symmetrical. No tremor.      Data:  CBC:   Lab Results   Component Value Date    WBC 4.5 04/22/2019    HGB 9.5 (L) 04/22/2019    HCT 29.3 (L) 04/22/2019    MCV 71.1 (L) 04/22/2019     04/22/2019     BMP:    Lab Results   Component Value Date     04/22/2019    K 3.5 (L) 04/22/2019     04/22/2019    CO2 17 (L) 04/22/2019    BUN 15 04/22/2019    CREATININE 0.96 (H) 04/22/2019    GLUCOSE 74 04/22/2019     CMP:   Lab Results   Component Value Date     04/22/2019    K 3.5 (L) 04/22/2019     04/22/2019    CO2 17 (L) 04/22/2019    BUN 15 04/22/2019    CREATININE 0.96 (H) 04/22/2019    GLUCOSE 74 04/22/2019    CALCIUM 8.4 (L) 04/22/2019    PROT 6.6 04/21/2019    LABALBU 2.9 (L) 04/21/2019    BILITOT 0.21 (L) 04/21/2019    ALKPHOS 123 (H) 04/21/2019    AST 10 04/21/2019    ALT 7 04/21/2019      Hepatic:   Lab Results   Component Value Date    AST 10 04/21/2019    ALT 7 04/21/2019    BILITOT 0.21 (L) 04/21/2019    ALKPHOS 123 (H) 04/21/2019     BNP: No results found for: BNP  Lipids: No results found for: CHOL, HDL  INR:   Lab Results   Component Value Date    INR 1.1 04/20/2019     PTH: No results found for: PTH  Phosphorus:    Lab Results   Component Value Date    PHOS 2.6 04/22/2019     Ionized Calcium: No results found for: IONCA  Magnesium:   Lab Results   Component Value Date    MG 1.5 (L) 04/22/2019     Albumin:   Lab Results   Component Value Date    LABALBU 2.9 (L) 04/21/2019     Last 3 CK, CKMB, Troponin: @LABRCNT(CKTOTAL:3,CKMB:3,TROPONINI:3)       URINE:)No results found for: Charles Greer    Radiology:   Reviewed. Assessment:dg resolving  SBO  Liver xplant  Hypomagnesemia  Hypokalemia         Plan:replace iv kphos 10mmols  Avoid hypotension, nephrotoxic drugs, Lovenox, Fleets enema and IV contrast exposure. Follow up labs ordered for AM.     Please do not hesitate to call with questions. We will follow with you.       Electronically signed by Ariane Medellin MD  on 4/22/2019 at 3:05 PM

## 2019-04-22 NOTE — PROGRESS NOTES
[facility]    Gastroenterology Daily Progress Note      Patient:   Cortes Londono   :    1950   Facility:   [facility]   Date:     2019  Consultant:   Vaishnavi Tran, HUEY      SUBJECTIVE  77 y.o. female admitted 2019 with SBO (small bowel obstruction) ([facility] [address] [K56.609] and seen for SBO with hx of ulcerative colitis. The pt was seen and examined. She did not have a bowel movement after the enema yesterday and is now not passing flatus. She has had scant NG drainage. She denies nausea and abdominal pain. Her KUB today showed a non obstructive gas pattern. She also reported dysphagia and underwent a fiberoptic laryngoscopy today with ENT that showed hypopharyngeal edema. She was placed on Rocephin and her steroid was increased.          OBJECTIVE  Scheduled Meds:   ipratropium-albuterol  1 ampule Inhalation Q4H While awake    hydrocortisone sodium succinate PF  50 mg Intravenous Q8H    cefTRIAXone (ROCEPHIN) IV  1 g Intravenous Q24H    [START ON 2019] tacrolimus  1 mg Sublingual QAM    tacrolimus  2 mg Oral Nightly    metoprolol  5 mg Intravenous Q6H    famotidine (PEPCID) injection  20 mg Intravenous Daily    ondansetron  4 mg Intravenous Once    sodium chloride flush  10 mL Intravenous 2 times per day    heparin (porcine)  5,000 Units Subcutaneous BID       Vital Signs:  BP (!) 147/68   Pulse 79   Temp 98.8 °F (37.1 °C)   Resp 18   Ht 5' 7\" (1.702 m)   Wt 178 lb 14.4 oz (81.1 kg)   SpO2 96%   BMI 28.02 kg/m²      Physical Exam:   General appearance: Alert & oriented, NAD  Lungs: CTA bilaterally    Heart: S1S2 RRR  Abdomen: Soft, Nontender, Not distended, BS hypoactive x4 NG clamped  Skin: No jaundice, No clubbing, No cyanosis    Lab and Imaging Review     CBC  Recent Labs     19  0457 19  0520 19  0525   WBC 4.6 3.5 4.5   HGB 10.5* 9.0* 9.5*   HCT 33.1* 28.5* 29.3*   MCV 72.2* 71.8* 71.1*    153 173       BMP  Recent Labs changes.           Impression   Mildly dilated loop of small bowel proximal to the anastomosis.  Findings may   be on the basis of delayed transit versus mild partial small bowel   obstruction.  No high-grade obstruction.      Xr Abdomen (kub) (single Ap View)     Result Date: 4/20/2019  EXAMINATION: SINGLE SUPINE XRAY VIEW(S) OF THE ABDOMEN 4/20/2019 7:32 am COMPARISON: April 19, 2019 HISTORY: ORDERING SYSTEM PROVIDED HISTORY: SBO TECHNOLOGIST PROVIDED HISTORY: SBO Acuity: Acute Type of Exam: Unknown FINDINGS: Enteric tube with the tip within the stomach. Moderate stool volume.  No abnormally dilated loops of small bowel.      No abnormally dilated loops of small bowel.         ASSESSMENT/PLAN:  1. Abdominal pain with SBO with hx of UC  NG clamped per surgery;  discussed with Dr Leesa Dhillon, will start clear liquid diet and see how she does; if she does well will remove NG  -will keep steroids at the present dose      2. Dysphagia with hypopharyngeal edema per FOL  -abx per ENT  -will hold on EGD at this time          This plan was formulated in collaboration with  .     Electronically signed by: MILAGRO Thomas CNP on 4/22/2019 at 2:24 PM

## 2019-04-22 NOTE — PROGRESS NOTES
Occupational Therapy  Facility/Department: [facility] MED SURG  Daily Treatment Note  NAME: Abdulaziz Doherty  : 1950  MRN: [id number]    Date of Service: 2019    Discharge Recommendations:  Home with assist PRN       Assessment   Performance deficits / Impairments: Decreased functional mobility ; Decreased ADL status; Decreased balance;Decreased endurance  Prognosis: Good  Patient Education: Written HEP addressing BUE strengthening issued as well as theraband. Edu on adaptations of therex in order to safely complete. REQUIRES OT FOLLOW UP: Yes  Activity Tolerance  Activity Tolerance: Patient Tolerated treatment well  Safety Devices  Safety Devices in place: Yes  Type of devices: Call light within reach; Left in chair;Nurse notified;Gait belt; All fall risk precautions in place; Patient at risk for falls         Patient Diagnosis(es): The primary encounter diagnosis was SBO (small bowel obstruction) ([facility] [address] A diagnosis of Generalized abdominal pain was also pertinent to this visit. has a past medical history of Liver transplant recipient [facility]), Sickle cell trait ([facility] [address] Tachycardia, and [facility] has a past surgical history that includes Liver transplant [id number]); hernia repair; Cholecystectomy (); and Total knee arthroplasty (Left, 2018). Restrictions  Restrictions/Precautions  Restrictions/Precautions: General Precautions  Required Braces or Orthoses?: No  Position Activity Restriction  Other position/activity restrictions:  Up with assist, IV, NG  Subjective   General  Chart Reviewed: Yes  Patient assessed for rehabilitation services?: Yes  Response to previous treatment: Patient with no complaints from previous session  Family / Caregiver Present: No  Oxygen Therapy  O2 Device: None (Room air)   Orientation  Orientation  Overall Orientation Status: Within Functional Limits  Objective    ADL  LE Dressing: Stand by assistance(Adjusting socks while seated in bed) Balance  Sitting Balance: Supervision  Bed mobility  Supine to Sit: Supervision  Scooting: Supervision  Transfers  Stand Step Transfers: Minimal assistance  Transfer Comments: From bed stand step to chair                       Cognition  Overall Cognitive Status: Exceptions  Cognition Comment: Pt often repeating self     Perception  Overall Perceptual Status: [facility]        Additional Activities Comment  Additional Activities:   Upon writer exit, call light within reach, pt retired to chair. All lines intact and patient positioned comfortably. All patient needs addressed prior to ending therapy session. Chart reviewed prior to treatment and patient is agreeable for therapy. RN reports patient is medically stable for therapy treatment this date.      Type of ROM/Therapeutic Exercise  Comment: Yellow theraband x10  Exercises  Shoulder Elevation: x10  Shoulder Flexion: x10  Shoulder Extension: x10  Shoulder ABduction: x10  Shoulder ADduction: x10  Horizontal ABduction: x10  Horizontal ADduction: x10  Elbow Flexion: x10  Elbow Extension: x10                    Plan   Plan  Times per week: 4-5x/week  Current Treatment Recommendations: Strengthening, Balance Training, Functional Mobility Training, Endurance Training, Equipment Evaluation, Education, & procurement, Patient/Caregiver Education & Training, Self-Care / ADL, Safety Education & Training    AM-PAC Score        AM-[facility] Inpatient Daily Activity Raw Score: 20  AM-PAC Inpatient ADL T-Scale Score : 42.03  ADL Inpatient CMS 0-100% Score: 38.32  ADL Inpatient CMS G-Code Modifier : CJ    Goals  Short term goals  Time Frame for Short term goals: by discharge, pt will  Short term goal 1: demo S/I with ADL transfers and functional mob for safe ADL completion at home and in community  Short term goal 2: demo and verb good understanding of fall prevention techs, EC/WS techs, and possible equip needs for home  Short term goal 3: demo S/I with UB/LB ADLs with DME as needed and good safety  Short term goal 4: demo I with HEP for B UE's for inc. strength for ADLs and transfers  Patient Goals   Patient goals : to go home       Therapy Time   Individual Concurrent Group Co-treatment   Time In [facility] 137         Minutes [address] [place] NOMAN Mcdonnell

## 2019-04-22 NOTE — PROGRESS NOTES
Prograf trough level obtained this AM after 6 doses of Prograf 1mg SUBLINGUAL BID was 2.3 ng/ml. Call was placed to [facility]/transplantation unit ([phone]) to speak with Nicole Barraza, Pharm. D./Transplantation pharmacist. Recommendation for adjusting therapy based on trough of 2.3ng/ml was to increase Prograf from 1 mg sublingugal BID to 1mg sublingual in AM and 2mg sublingual at HS. Repeat trough EARLY (pre-steady/state) in AM of Wednesday 4/24 to make sure level is going up. Because patient is not a recent transplant (liver transplant approx 9 years ago), this coarse of action was recommended. Dose adjusted and new trough level ordered. Will continue to monitor. Also inquired if edema in the throat is something the transplant center has seen after sublingual tacrolimus administration. Was informed this is possible, but not something that they commonly see with this route of administration. Will continue to educate nursing regarding proper administration technique.

## 2019-04-22 NOTE — PLAN OF CARE
Patient is a fall risk during this admission. Fall risk assessment was performed. Patient is absent of falls. Bed is in the lowest position. Wheels on the bed are locked. Call light and bed side table are within reach. Clutter is removed. Patient was educated to call out when needing assistance or wanting to get out of bed. Patient offered toileting assistance during rounding. Hourly rounds have been performed.

## 2019-04-22 NOTE — PROGRESS NOTES
Physical Therapy  Facility/Department: [facility] MED SURG  Daily Treatment Note  NAME: Enrico Casillas  : 1950  MRN: [id number]    Date of Service: 2019    Discharge Recommendations:  Home with assist PRN   PT Equipment Recommendations  Equipment Needed: No    Patient Diagnosis(es): The primary encounter diagnosis was SBO (small bowel obstruction) ([facility] [address] A diagnosis of Generalized abdominal pain was also pertinent to this visit. has a past medical history of Liver transplant recipient [facility]), Sickle cell trait ([facility] [address] Tachycardia, and Ulcerative colitis ([facility] has a past surgical history that includes Liver transplant ([id number]); hernia repair; Cholecystectomy (); and Total knee arthroplasty (Left, 2018). Restrictions  Restrictions/Precautions  Restrictions/Precautions: General Precautions  Required Braces or Orthoses?: No  Position Activity Restriction  Other position/activity restrictions: NG tube  Subjective   General  Chart Reviewed: Yes  Family / Caregiver Present: No  Subjective  Subjective: Patient is agreeable for PT treatment. General Comment  Comments: RNKrysten reports patient is having excessive mucus build up and doctor would like her Ng tube hooked back up to suction, okay to ambulated prior.    Pain Screening  Patient Currently in Pain: Denies  Pain Assessment  Pain Assessment: 0-10  Pain Level: 0  Vital Signs  Patient Currently in Pain: Denies       Orientation  Orientation  Overall Orientation Status: Within Functional Limits  Cognition   Cognition  Overall Cognitive Status: WFL  Objective   Bed mobility  Rolling to Left: Supervision  Rolling to Right: Supervision  Sit to Supine: Supervision  Scooting: Supervision  Transfers  Sit to Stand: Stand by assistance  Stand to sit: Stand by assistance  Stand Pivot Transfers: Stand by assistance  Lateral Transfers: Stand by assistance  Ambulation  Ambulation?: Yes  More Ambulation?: No  Ambulation 1  Surface: level tile  Device: (IV pole)  Assistance: Stand by assistance  Quality of Gait: slow gait, mildly antalgic  Distance: 150 ft  Stairs/Curb  Stairs?: No(Patient hooked to IV and patient reports she is too fatigued. )   Activity: Patient performed toilet transfer SBA with good balance during donning/doffing LB and hand placement. Balance  Posture: Good  Sitting - Static: Good  Sitting - Dynamic: Good  Standing - Static: Fair;+  Standing - Dynamic: Fair  Comments: standing balance with single UE support  Exercises  Comments: Seated rachna LE AROM x 10 reps with several rest breaks post coughing fits x 2 mins. Assessment   Body structures, Functions, Activity limitations: Decreased functional mobility ; Decreased balance  Assessment: Patient is appropriate for home meghna PRN when medically stable for PT treatment. Prognosis: Excellent  REQUIRES PT FOLLOW UP: Yes  Activity Tolerance  Activity Tolerance: Patient Tolerated treatment well     Goals  Short term goals  Time Frame for Short term goals: 10 visits  Short term goal 1: Pt mod indep with all transfers and bed mobility  Short term goal 2: Pt amb 200 ft with cane mod indep  Short term goal 3: Pt negotiate 12 stairs with 1 rail and supervision  Short term goal 4: Pt improve standing balance to good  Patient Goals   Patient goals :  To feel better    Plan    Plan  Times per week: 1-2x/day, 5-6 days a week  Current Treatment Recommendations: Strengthening, Transfer Training, Endurance Training, Balance Training, Gait Training, Stair training, Functional Mobility Training  Safety Devices  Type of devices: Call light within reach, Left in chair, Nurse notified, Gait belt  Restraints  Initially in place: No     Therapy Time   Individual Concurrent Group Co-treatment   Time In 0849         Time Out 0912         Minutes [address] [place] [address]

## 2019-04-22 NOTE — PLAN OF CARE
Problem: Bowel/Gastric:  Goal: Control of bowel function will improve  Description  Control of bowel function will improve  Outcome: Ongoing  Note:   Remains with NG to LIWS, clear/mucousy output.   No bowel output after enema  Problem: Pain:  Goal: Pain level will decrease  Description  Pain level will decrease  Outcome: Ongoing  Note:   No c/o severe pain tonite     Problem: Falls - Risk of:  Goal: Will remain free from falls  Description  Will remain free from falls  Outcome: Ongoing  Note:   Call bell in reach, uses appropriately, pt up with sba prn, steady gait

## 2019-04-22 NOTE — PROCEDURES
13399    Anesthesia: none    Flexible scope was passed through the nasal passage. Examination of the oropharynx, hypopharnyx, and vocal cords were done.   Exam was normal except for the significant findings of :     Edema of the hypopharynx and esophageal inlet

## 2019-04-22 NOTE — CONSULTS
Department of Otolaryngology    Assesment/Plan:  Hypopharyngeal edema/pharyngitis- Cover with antibiotics and consider steroids. May benefit from a EGD. CHIEF COMPLAINT:  Trouble swallowing    HISTORY OF PRESENT ILLNESS:              Jocelyn Long  is a 77 y.o. female with trouble swallowing for the past few days. NO previous problem with swallow. Reported having some pain that started in the last 2 days. Feels that her neck is tender. Has hx of UC and is immunocompromised. Denies any shortness of breath.     Past Medical History:        Diagnosis Date    Liver transplant recipient [facility]) 2004 and 2010    x2     Sick[facility] trait ([facility] [address]     Tachycardia     Ulcerative colitis ([facility] [address]      Past Surgical History:        Procedure Laterality Date    CHOLECYSTECTOMY  1987    HERNIA REPAIR      Hiatal and Incisional     LIVER TRANSPLANT  2004,2010    x2    TOTAL KNEE ARTHROPLASTY Left 2018     Current Medications:   Current Facility-Administered Medications: ipratropium-albuterol (DUONEB) nebulizer solution 1 ampule, 1 ampule, Inhalation, Q4H While awake  hydrocortisone sodium succinate PF (SOLU-CORTEF) injection 50 mg, 50 mg, Intravenous, Q8H  cefTRIAXone (ROCEPHIN) 1 g IVPB in 50 mL D5W minibag, 1 g, Intravenous, Q24H  metoprolol (LOPRESSOR) injection 5 mg, 5 mg, Intravenous, Q6H  0.45 % sodium chloride infusion, , Intravenous, Continuous  benzocaine-menthol (CEPACOL SORE THROAT) lozenge 1 lozenge, 1 lozenge, Oral, Q2H PRN  famotidine (PEPCID) injection 20 mg, 20 mg, Intravenous, Daily  ondansetron (ZOFRAN) injection 4 mg, 4 mg, Intravenous, Once  sodium chloride flush 0.9 % injection 10 mL, 10 mL, Intravenous, 2 times per day  sodium chloride flush 0.9 % injection 10 mL, 10 mL, Intravenous, PRN  acetaminophen (TYLENOL) tablet 650 mg, 650 mg, Oral, Q4H PRN  ondansetron (ZOFRAN-ODT) disintegrating tablet 4 mg, 4 mg, Oral, Q6H PRN **OR** ondansetron (ZOFRAN) injection 4 mg, 4 mg, Intravenous, Q6H ENDOCRINE:  negative   MUSCULOSKELETAL:  negative   NEUROLOGICAL:  negative   BEHAVIOR/PSYCH:  negative     PHYSICAL EXAM:    VITALS:  BP (!) 147/68   Pulse 79   Temp 98.8 °F (37.1 °C)   Resp 18   Ht 5' 7\" (1.702 m)   Wt 178 lb 14.4 oz (81.1 kg)   SpO2 96%   BMI 28.02 kg/m²       CONSTITUTIONAL:  awake, alert, cooperative, no apparent distress, and appears stated age  EYES:  Lids and lashes normal, pupils equal, round and reactive to light, extra ocular muscles intact, sclera clear, conjunctiva normal  ENT:  Normocephalic, without obvious abnormality, atraumatic, sinuses nontender on palpation, external ears without lesions, oral pharynx with moist mucus membranes, tonsils without erythema or exudates, gums normal.  NECK:  Supple, symmetrical, trachea midline, no adenopathy, thyroid symmetric, not enlarged but does have tenderness in the midline and hyoid region, skin normal  MUSCULOSKELETAL:  There is no redness, warmth, or swelling of the joints. Full range of motion noted. Motor strength is 5 out of 5 all extremities bilaterally. Tone is normal.  NEUROLOGIC:  Awake, alert, oriented to name, place and time. Cranial nerves II-XII are grossly intact. Motor is 5 out of 5 bilaterally. Sensory is intact.      DATA:    Labs and Radiology reports/films reviewed

## 2019-04-22 NOTE — PROGRESS NOTES
Nutrition Assessment    Type and Reason for Visit: Positive Nutrition Screen(MST=2)    Nutrition Recommendations: 1. Suggest continuing with NPO effective now. 2. Consider adding oral nutrition supplement (Ensure Clear) 2x/d when clinically appropriate. Nutrition Assessment: Patient is severely malnourished as evidenced by reported 12lb (6%) weight loss in 2 weeks, poor PO intake (<50% of meals), difficulty swallowing x5 days, and mild subcutaneous fat loss (orbitals). Patient reported she hasnt had an appetite for 2 weeks due to her UC flare up. She is currently NPO, but states they may change her diet to clear liquids tonight. Consider adding oral nutrition supplement (Ensure Clear) once clinically appropriate. Malnutrition Assessment:  · Malnutrition Status: Meets the criteria for severe malnutrition  · Context: Acute illness or injury  · Findings of the 6 clinical characteristics of malnutrition (Minimum of 2 out of 6 clinical characteristics is required to make the diagnosis of moderate or severe Protein Calorie Malnutrition based on AND/ASPEN Guidelines):  1. Energy Intake-Less than or equal to 50% of estimated energy requirement, Greater than or equal to 7 days    2. Weight Loss-5% loss or greater, (2 weeks )  3. Fat Loss-Mild subcutaneous fat loss, Orbital  4. Muscle Loss-Unable to assess  5. Fluid Accumulation-Mild fluid accumulation, Extremities  6.   Strength-Not measured    Nutrition Risk Level: High    Nutrient Needs:  · Estimated Daily Total Kcal: 7568-6257 (25-26 kcal/kg)   · Estimated Daily Protein (g): 80-90 (1.0-1.1 g/kg)   · Estimated Daily Total Fluid (ml/day): 6738-7916 (1ml/kcal)     Nutrition Diagnosis:   · Problem: Severe malnutrition  · Etiology: related to Alteration in GI function, Acute injury/trauma, Difficulty swallowing     Signs and symptoms:  as evidenced by NPO status due to medical condition, Diarrhea, Nausea, Vomiting, Patient report of, Diet history of poor intake, Weight loss, Mild loss of subcutaneous fat    Objective Information:  · Nutrition-Focused Physical Findings: GI: rounded; soft; cramping; loss of appetite; nausea; bloating; constipation; tenderness; hypoactive bowel sounds  PV: RLE/LLE trace edema  Skin: WDL   · Wound Type: None  · Current Nutrition Therapies:  · Oral Diet Orders: NPO   · Oral Diet intake: NPO  · Oral Nutrition Supplement (ONS) Orders: None  · ONS intake: NPO  · Anthropometric Measures:  · Ht: 5' 7\" (170.2 cm)   · Current Body Wt: 178 lb 14.4 oz (81.1 kg)  · Admission Body Wt: 183 lb (83 kg)(Stated)  · Usual Body Wt: 190 lb (86.2 kg)  · % Weight Change:  ,  6% in 2 weeks   · Ideal Body Wt: 135 lb (61.2 kg), % Ideal Body 132%  · BMI Classification: BMI 25.0 - 29.9 Overweight    Nutrition Interventions:   Continue NPO  Continued Inpatient Monitoring, Coordination of Care    Nutrition Evaluation:   · Evaluation: Goals set   · Goals: PO intake to meet >75% of estimaed kcal/protein needs with good GI tolerance and renal indices     · Monitoring: Skin Integrity, I&O, Weight, Pertinent Labs, Nausea or Vomiting, Diarrhea, Constipation, Monitor Bowel Function      Violette Hickey, Dietetic Intern   Electronically signed by Jeremy Yen RD, LD on 4/22/19 at 4:23 PM    Contact Number: [phone]

## 2019-04-22 NOTE — PROGRESS NOTES
General Surgery Progress Note            PATIENT NAME: Susan Duarte     TODAY'S DATE: 4/22/2019, 6:45 AM    SUBJECTIVE/OBJECTIVE:      VITALS:  BP (!) 148/84   Pulse 79   Temp 98.6 °F (37 °C) (Oral)   Resp 16   Ht 5' 7\" (1.702 m)   Wt 178 lb 14.4 oz (81.1 kg)   SpO2 96%   BMI 28.02 kg/m²      Patient seen and examined  A&O X 3. Non-toxic  RRR  LCTAB  Abdomen soft, ND, mildly tender. +BS. NGT output scant    INTAKE/OUTPUT:      Intake/Output Summary (Last 24 hours) at 4/22/2019 0645  Last data filed at 4/22/2019 0530  Gross per 24 hour   Intake 2100.64 ml   Output 1825 ml   Net 275.64 ml       CBC with Differential:    Lab Results   Component Value Date    WBC 4.5 04/22/2019    RBC 4.12 04/22/2019    HGB 9.5 04/22/2019    HCT 29.3 04/22/2019     04/22/2019    MCV 71.1 04/22/2019    MCH 23.0 04/22/2019    MCHC 32.4 04/22/2019    RDW 15.8 04/22/2019    LYMPHOPCT 30 04/22/2019    MONOPCT 8 04/22/2019    BASOPCT 0 04/22/2019    MONOSABS 0.30 04/22/2019    LYMPHSABS 1.40 04/22/2019    EOSABS 0.60 04/22/2019    BASOSABS 0.00 04/22/2019    DIFFTYPE NOT REPORTED 04/22/2019     BMP:    Lab Results   Component Value Date     04/22/2019    K 3.5 04/22/2019     04/22/2019    CO2 17 04/22/2019    BUN 15 04/22/2019    LABALBU 2.9 04/21/2019    CREATININE 0.96 04/22/2019    CALCIUM 8.4 04/22/2019    GFRAA >60 04/22/2019    LABGLOM 57 04/22/2019    GLUCOSE 74 04/22/2019       KUB reviewed      ASSESSMENT/PLAN:    Principal Problem:    Ulcerative colitis ([facility] [address]  Active Problems:    SBO (small bowel obstruction) (HCC)    Liver transplant recipient [facility])    Benign essential HTN    Sickle cell trait (HCC)    KAYLA (acute kidney injury) ([facility] [address]    Stage 3 chronic kidney disease (HCC)    Elevated LFTs  Resolved Problems:    * No resolved hospital problems.  *    Ileus vs PSBO  Clamp NGT  Up as tolerated

## 2019-04-23 PROBLEM — J02.9 ACUTE PHARYNGITIS: Status: ACTIVE | Noted: 2019-04-23

## 2019-04-23 LAB
ABSOLUTE EOS #: 0 K/UL (ref 0–0.4)
ABSOLUTE IMMATURE GRANULOCYTE: ABNORMAL K/UL (ref 0–0.3)
ABSOLUTE LYMPH #: 0.8 K/UL (ref 1–4.8)
ABSOLUTE MONO #: 0.2 K/UL (ref 0.2–0.8)
ANION GAP SERPL CALCULATED.3IONS-SCNC: 17 MMOL/L (ref 9–17)
BASOPHILS # BLD: 0 % (ref 0–2)
BASOPHILS ABSOLUTE: 0 K/UL (ref 0–0.2)
BUN BLDV-MCNC: 16 MG/DL (ref 8–23)
BUN/CREAT BLD: 17 (ref 9–20)
C-REACTIVE PROTEIN: 30.3 MG/L (ref 0–5)
CALCIUM SERPL-MCNC: 8.2 MG/DL (ref 8.6–10.4)
CHLORIDE BLD-SCNC: 99 MMOL/L (ref 98–107)
CO2: 17 MMOL/L (ref 20–31)
CREAT SERPL-MCNC: 0.94 MG/DL (ref 0.5–0.9)
DIFFERENTIAL TYPE: ABNORMAL
EOSINOPHILS RELATIVE PERCENT: 1 % (ref 1–4)
FERRITIN: 413 UG/L (ref 13–150)
GFR AFRICAN AMERICAN: >60 ML/MIN
GFR NON-AFRICAN AMERICAN: 59 ML/MIN
GFR SERPL CREATININE-BSD FRML MDRD: ABNORMAL ML/MIN/{1.73_M2}
GFR SERPL CREATININE-BSD FRML MDRD: ABNORMAL ML/MIN/{1.73_M2}
GLUCOSE BLD-MCNC: 172 MG/DL (ref 70–99)
HCT VFR BLD CALC: 28.7 % (ref 36–46)
HEMOGLOBIN: 9.4 G/DL (ref 12–16)
IMMATURE GRANULOCYTES: ABNORMAL %
IRON SATURATION: 16 % (ref 20–55)
IRON: 23 UG/DL (ref 37–145)
LYMPHOCYTES # BLD: 27 % (ref 24–44)
MAGNESIUM: 1.8 MG/DL (ref 1.6–2.6)
MCH RBC QN AUTO: 23.7 PG (ref 26–34)
MCHC RBC AUTO-ENTMCNC: 32.9 G/DL (ref 31–37)
MCV RBC AUTO: 72 FL (ref 80–100)
MONOCYTES # BLD: 7 % (ref 1–7)
NRBC AUTOMATED: ABNORMAL PER 100 WBC
PDW BLD-RTO: 14.3 % (ref 11.5–14.5)
PHOSPHORUS: 3.9 MG/DL (ref 2.6–4.5)
PLATELET # BLD: 165 K/UL (ref 130–400)
PLATELET ESTIMATE: ABNORMAL
PMV BLD AUTO: ABNORMAL FL (ref 6–12)
POTASSIUM SERPL-SCNC: 3.9 MMOL/L (ref 3.7–5.3)
RBC # BLD: 3.98 M/UL (ref 4–5.2)
RBC # BLD: ABNORMAL 10*6/UL
SEG NEUTROPHILS: 65 % (ref 36–66)
SEGMENTED NEUTROPHILS ABSOLUTE COUNT: 2 K/UL (ref 1.8–7.7)
SODIUM BLD-SCNC: 133 MMOL/L (ref 135–144)
TOTAL IRON BINDING CAPACITY: 146 UG/DL (ref 250–450)
UNSATURATED IRON BINDING CAPACITY: 123 UG/DL (ref 112–347)
WBC # BLD: 3 K/UL (ref 3.5–11)
WBC # BLD: ABNORMAL 10*3/UL

## 2019-04-23 PROCEDURE — 83550 IRON BINDING TEST: CPT

## 2019-04-23 PROCEDURE — 6370000000 HC RX 637 (ALT 250 FOR IP): Performed by: FAMILY MEDICINE

## 2019-04-23 PROCEDURE — 2500000003 HC RX 250 WO HCPCS: Performed by: INTERNAL MEDICINE

## 2019-04-23 PROCEDURE — 97110 THERAPEUTIC EXERCISES: CPT

## 2019-04-23 PROCEDURE — 1200000000 HC SEMI PRIVATE

## 2019-04-23 PROCEDURE — 84100 ASSAY OF PHOSPHORUS: CPT

## 2019-04-23 PROCEDURE — 2580000003 HC RX 258: Performed by: INTERNAL MEDICINE

## 2019-04-23 PROCEDURE — 80048 BASIC METABOLIC PNL TOTAL CA: CPT

## 2019-04-23 PROCEDURE — 83735 ASSAY OF MAGNESIUM: CPT

## 2019-04-23 PROCEDURE — 97530 THERAPEUTIC ACTIVITIES: CPT

## 2019-04-23 PROCEDURE — 85025 COMPLETE CBC W/AUTO DIFF WBC: CPT

## 2019-04-23 PROCEDURE — 97530 THERAPEUTIC ACTIVITIES: CPT | Performed by: NURSE PRACTITIONER

## 2019-04-23 PROCEDURE — 6360000002 HC RX W HCPCS: Performed by: FAMILY MEDICINE

## 2019-04-23 PROCEDURE — 82728 ASSAY OF FERRITIN: CPT

## 2019-04-23 PROCEDURE — 36415 COLL VENOUS BLD VENIPUNCTURE: CPT

## 2019-04-23 PROCEDURE — 83540 ASSAY OF IRON: CPT

## 2019-04-23 PROCEDURE — 86140 C-REACTIVE PROTEIN: CPT

## 2019-04-23 PROCEDURE — 97116 GAIT TRAINING THERAPY: CPT

## 2019-04-23 PROCEDURE — 94760 N-INVAS EAR/PLS OXIMETRY 1: CPT

## 2019-04-23 PROCEDURE — APPNB30 APP NON BILLABLE TIME 0-30 MINS: Performed by: NURSE PRACTITIONER

## 2019-04-23 PROCEDURE — 99232 SBSQ HOSP IP/OBS MODERATE 35: CPT | Performed by: INTERNAL MEDICINE

## 2019-04-23 PROCEDURE — 2500000003 HC RX 250 WO HCPCS: Performed by: FAMILY MEDICINE

## 2019-04-23 PROCEDURE — 2580000003 HC RX 258: Performed by: FAMILY MEDICINE

## 2019-04-23 RX ORDER — SODIUM CHLORIDE 9 MG/ML
INJECTION, SOLUTION INTRAVENOUS CONTINUOUS
Status: DISCONTINUED | OUTPATIENT
Start: 2019-04-23 | End: 2019-04-24

## 2019-04-23 RX ORDER — MESALAMINE 800 MG/1
1600 TABLET, DELAYED RELEASE ORAL 3 TIMES DAILY
COMMUNITY

## 2019-04-23 RX ORDER — ATORVASTATIN CALCIUM 20 MG/1
20 TABLET, FILM COATED ORAL DAILY
COMMUNITY

## 2019-04-23 RX ADMIN — SODIUM CHLORIDE: 9 INJECTION, SOLUTION INTRAVENOUS at 07:59

## 2019-04-23 RX ADMIN — TACROLIMUS 2 MG: 1 CAPSULE ORAL at 21:46

## 2019-04-23 RX ADMIN — SODIUM CHLORIDE: 4.5 INJECTION, SOLUTION INTRAVENOUS at 03:47

## 2019-04-23 RX ADMIN — METOPROLOL TARTRATE 5 MG: 5 INJECTION INTRAVENOUS at 16:45

## 2019-04-23 RX ADMIN — HYDROCORTISONE SODIUM SUCCINATE 50 MG: 100 INJECTION, POWDER, FOR SOLUTION INTRAMUSCULAR; INTRAVENOUS at 09:47

## 2019-04-23 RX ADMIN — METOPROLOL TARTRATE 5 MG: 5 INJECTION INTRAVENOUS at 21:48

## 2019-04-23 RX ADMIN — METOPROLOL TARTRATE 5 MG: 5 INJECTION INTRAVENOUS at 03:41

## 2019-04-23 RX ADMIN — HYDROCORTISONE SODIUM SUCCINATE 50 MG: 100 INJECTION, POWDER, FOR SOLUTION INTRAMUSCULAR; INTRAVENOUS at 18:38

## 2019-04-23 RX ADMIN — HEPARIN SODIUM 5000 UNITS: 5000 INJECTION, SOLUTION INTRAVENOUS; SUBCUTANEOUS at 21:47

## 2019-04-23 RX ADMIN — HEPARIN SODIUM 5000 UNITS: 5000 INJECTION, SOLUTION INTRAVENOUS; SUBCUTANEOUS at 09:48

## 2019-04-23 RX ADMIN — CEFTRIAXONE SODIUM 1 G: 1 INJECTION, POWDER, FOR SOLUTION INTRAMUSCULAR; INTRAVENOUS at 13:42

## 2019-04-23 RX ADMIN — TACROLIMUS 1 MG: 1 CAPSULE ORAL at 09:47

## 2019-04-23 RX ADMIN — BENZOCAINE AND MENTHOL 1 LOZENGE: 15; 3.6 LOZENGE ORAL at 16:43

## 2019-04-23 RX ADMIN — FAMOTIDINE 20 MG: 10 INJECTION, SOLUTION INTRAVENOUS at 09:46

## 2019-04-23 RX ADMIN — POTASSIUM PHOSPHATE, MONOBASIC AND POTASSIUM PHOSPHATE, DIBASIC 10 MMOL: 224; 236 INJECTION, SOLUTION, CONCENTRATE INTRAVENOUS at 00:10

## 2019-04-23 RX ADMIN — METOPROLOL TARTRATE 5 MG: 5 INJECTION INTRAVENOUS at 09:48

## 2019-04-23 RX ADMIN — HYDROCORTISONE SODIUM SUCCINATE 50 MG: 100 INJECTION, POWDER, FOR SOLUTION INTRAMUSCULAR; INTRAVENOUS at 02:58

## 2019-04-23 ASSESSMENT — PAIN SCALES - GENERAL
PAINLEVEL_OUTOF10: 4
PAINLEVEL_OUTOF10: 0
PAINLEVEL_OUTOF10: 0

## 2019-04-23 ASSESSMENT — PAIN DESCRIPTION - PAIN TYPE: TYPE: ACUTE PAIN

## 2019-04-23 ASSESSMENT — PAIN DESCRIPTION - LOCATION: LOCATION: THROAT

## 2019-04-23 NOTE — PROGRESS NOTES
[place] [facility]    Gastroenterology Daily Progress Note      Patient:   Susan Duarte   :    1950   Facility:   [facility]  Date:     2019  Consultant:   Junaid Martinez CNP      SUBJECTIVE  77 y.o. female admitted 2019 with SBO (small bowel obstruction) ([facility] [address] [K56.609] and seen for SBO with a hx of ulcerative colitis. She currently denies pain and nausea and is tolerating liquids. She is passing flatus but has not had a bowel movement. She has minor right sided pharyngitis but denies difficulty swallowing and  shortness of breath.          OBJECTIVE  Scheduled Meds:   ipratropium-albuterol  1 ampule Inhalation Q4H While awake    hydrocortisone sodium succinate PF  50 mg Intravenous Q8H    cefTRIAXone (ROCEPHIN) IV  1 g Intravenous Q24H    tacrolimus  1 mg Sublingual QAM    tacrolimus  2 mg Oral Nightly    metoprolol  5 mg Intravenous Q6H    famotidine (PEPCID) injection  20 mg Intravenous Daily    ondansetron  4 mg Intravenous Once    sodium chloride flush  10 mL Intravenous 2 times per day    heparin (porcine)  5,000 Units Subcutaneous BID       Vital Signs:  BP (!) 160/88   Pulse 64   Temp 97.5 °F (36.4 °C) (Oral)   Resp 16   Ht 5' 7\" (1.702 m)   Wt 182 lb 9.6 oz (82.8 kg)   SpO2 100%   BMI 28.60 kg/m²      Physical Exam:   General appearance: Alert & oriented, NAD  Lungs: CTA bilaterally    Heart: S1S2 RRR  Abdomen: Soft, Nontender, Not distended, BS WNL NG is clamped  Skin: No jaundice, No clubbing, No cyanosis    Lab and Imaging Review     CBC  Recent Labs     19  0520 19  0525 19  0507   WBC 3.5 4.5 3.0*   HGB 9.0* 9.5* 9.4*   HCT 28.5* 29.3* 28.7*   MCV 71.8* 71.1* 72.0*    173 165       BMP  Recent Labs     19  0520 19  0525 19  0507    138 133*   K 3.9 3.5* 3.9   * 105 99   CO2 16* 17* 17*   BUN 20 15 16   CREATININE 1.06* 0.96* 0.94*   GLUCOSE 84 74 172*   CALCIUM 7.9* 8.4* 8.2* LFTS  Recent Labs     04/21/19  0520   ALKPHOS 123*   ALT 7   AST 10   PROT 6.6   BILITOT 0.21*   BILIDIR 0.08   LABALBU 2.9*       ANEMIA STUDIES  Recent Labs     04/23/19  0507   LABIRON 16*   TIBC 146*   FERRITIN 413*     FINDINGS:KUB 4/22/19   Nasogastric tube tip remains in the stomach.  Scattered gas within nondilated   bowel loops throughout the abdomen.  No gas-filled dilated small bowel loops   to suggest small bowel obstruction.  There are clips in the upper abdomen in   stable calcifications in the pelvis.  Bones are osteopenic with degenerative   changes lower lumbar spine.           Impression   Relatively stable exam with a nonspecific nonobstructive bowel gas pattern.          FINDINGS:ct abd 4/19/19   Lower Chest: Dependent lung changes.       Organs: Evaluation of the solid organs is limited without intravenous   contrast. Postsurgical changes within the upper abdomen from history of liver   transplant.  No liver lesion.  Mild pneumobilia.  Cholecystectomy.    Pancreatic atrophy.  No definite pancreatic lesion.  No splenomegaly.  No   adrenal lesion.  No hydronephrosis.  Bilateral renal atrophy.  No renal   calculus.  Multiple left renal cysts.       GI/Bowel: Small bowel anastomosis.  Mildly dilated loop of small bowel   proximal to the anastomosis.  No acute inflammatory process.       Pelvis: No free fluid.  No bladder calculus.       Peritoneum/Retroperitoneum: Atherosclerotic calcification of the abdominal   aorta without aneurysmal dilatation.  No adenopathy.       Bones/Soft Tissues: Postsurgical changes along the lower anterior abdominal   wall.  No acute abnormality.  Diffuse osteopenia.  Lumbar spine degenerative   changes.           Impression   Mildly dilated loop of small bowel proximal to the anastomosis.  Findings may   be on the basis of delayed transit versus mild partial small bowel   obstruction.  No high-grade obstruction.      Xr Abdomen (kub) (single Ap View)     Result Date: 4/20/2019  EXAMINATION: SINGLE SUPINE XRAY VIEW(S) OF THE ABDOMEN 4/20/2019 7:32 am COMPARISON: April 19, 2019 HISTORY: ORDERING SYSTEM PROVIDED HISTORY: SBO TECHNOLOGIST PROVIDED HISTORY: SBO Acuity: Acute Type of Exam: Unknown FINDINGS: Enteric tube with the tip within the stomach. Moderate stool volume.  No abnormally dilated loops of small bowel.      No abnormally dilated loops of small bowel.      ASSESSMENT/PLAN:  1. Abdominal pain with SBO with hx of UC  NG clamped per surgery;  discussed with Dr Leesa Dhillon; will discontinue the NG and advance her diet to full liquids  -recommend decreasing the steroids to 40mg daily    2.dysphagia; improved and the pt currently denies this  -abx per ENT            This plan was formulated in collaboration with Dr. Leesa Dhillon.     Electronically signed by: MILAGRO Thomas CNP on 4/23/2019 at 2:11 PM

## 2019-04-23 NOTE — PROGRESS NOTES
Nephrology Progress Note        Subjective: the patient is feeling better, no SOB, on clear liquid diet, poor po intake secondary to sore throat, on IVF, BP is stable, afebrile, good UOP    Objective:  CURRENT TEMPERATURE:  Temp: 98 °F (36.7 °C)  MAXIMUM TEMPERATURE OVER 24HRS:  Temp (24hrs), Av.2 °F (36.8 °C), Min:97.7 °F (36.5 °C), Max:98.8 °F (37.1 °C)    CURRENT RESPIRATORY RATE:  Resp: 16  CURRENT PULSE:  Pulse: 79  CURRENT BLOOD PRESSURE:  BP: (!) 150/90  24HR BLOOD PRESSURE RANGE:  Systolic (35NRO), ZLW:039 , Min:131 , EVH:558   ; Diastolic (42XKM), AQQ:84, Min:67, Max:90    24HR INTAKE/OUTPUT:      Intake/Output Summary (Last 24 hours) at 2019 0843  Last data filed at 2019 0601  Gross per 24 hour   Intake 2597 ml   Output 1100 ml   Net 1497 ml     Weight   Wt Readings from Last 3 Encounters:   19 182 lb 9.6 oz (82.8 kg)       Physical Exam:  Awake, alert, in no acute distress  Skin: warm and dry, no rash or erythema  Eyes: conjunctivae normal and sclera anicteric  Pulmonary: clear to auscultation bilaterally- no wheezes, rales or rhonchi, normal air movement, no respiratory distress  Cardiovascular: normal rate and normal S1 and S2  Abdomen: soft nontender, bowel sounds present, no organomegaly,  no ascites  Extremities: no cyanosis, clubbing or edema    Current Medications:      0.9 % sodium chloride infusion Continuous   ipratropium-albuterol (DUONEB) nebulizer solution 1 ampule Q4H While awake   hydrocortisone sodium succinate PF (SOLU-CORTEF) injection 50 mg Q8H   cefTRIAXone (ROCEPHIN) 1 g IVPB in 50 mL D5W minibag Q24H   tacrolimus (PROGRAF) capsule 1 mg QAM   tacrolimus (PROGRAF) capsule 2 mg Nightly   metoprolol (LOPRESSOR) injection 5 mg Q6H   benzocaine-menthol (CEPACOL SORE THROAT) lozenge 1 lozenge Q2H PRN   famotidine (PEPCID) injection 20 mg Daily   ondansetron (ZOFRAN) injection 4 mg Once   sodium chloride flush 0.9 % injection 10 mL 2 times per day   sodium chloride flush 0.9 % injection 10 mL PRN   acetaminophen (TYLENOL) tablet 650 mg Q4H PRN   ondansetron (ZOFRAN-ODT) disintegrating tablet 4 mg Q6H PRN   Or    ondansetron (ZOFRAN) injection 4 mg Q6H PRN   HYDROmorphone (DILAUDID) injection 0.5 mg Q4H PRN   phenol 1.4 % mouth spray 1 spray Q2H PRN   heparin (porcine) injection 5,000 Units BID       Labs:    CBC:  Recent Labs     04/21/19 0520 04/22/19  0525 04/23/19  0507   WBC 3.5 4.5 3.0*   RBC 3.96* 4.12 3.98*   HGB 9.0* 9.5* 9.4*   HCT 28.5* 29.3* 28.7*   MCV 71.8* 71.1* 72.0*   MCH 22.7* 23.0* 23.7*   MCHC 31.7 32.4 32.9   RDW 15.8* 15.8* 14.3    173 165   MPV 10.3 9.8 NOT REPORTED     Chemistry:  Recent Labs     04/21/19 0520 04/22/19  0525 04/22/19  0831 04/23/19  0507    138  --  133*   K 3.9 3.5*  --  3.9   * 105  --  99   CO2 16* 17*  --  17*   LABALBU 2.9*  --   --   --    GLUCOSE 84 74  --  172*   BUN 20 15  --  16   CREATININE 1.06* 0.96*  --  0.94*   MG 1.3* 1.5* 1.5* 1.8   ANIONGAP 15 16  --  17   LABGLOM 51* 57*  --  59*   GFRAA >60 >60  --  >60   CALCIUM 7.9* 8.4*  --  8.2*   CAION  --   --  1.24  --    PHOS 3.8 2.6  --  3.9     Recent Labs     04/21/19 0520 04/22/19  0831   PROT 6.6  --    LABALBU 2.9*  --    W6XELNS  --  7.5   TSH  --  0.72   AST 10  --    ALT 7  --    ALKPHOS 123*  --    BILITOT 0.21*  --    BILIDIR 0.08  --      Phosphorus:    Lab Results   Component Value Date    PHOS 3.9 04/23/2019     Magnesium:   Lab Results   Component Value Date    MG 1.8 04/23/2019     Albumin:   Lab Results   Component Value Date    LABALBU 2.9 04/21/2019     Urine Protein:    Lab Results   Component Value Date    PROTEINU 1+ 04/19/2019       Radiology:  Ct Abdomen Pelvis Wo Contrast    Result Date: 4/19/2019  EXAMINATION: CT OF THE ABDOMEN AND PELVIS WITHOUT CONTRAST 4/19/2019 6:38 am TECHNIQUE: CT of the abdomen and pelvis was performed without the administration of intravenous contrast. Multiplanar reformatted images are provided for review. Dose modulation, iterative reconstruction, and/or weight based adjustment of the mA/kV was utilized to reduce the radiation dose to as low as reasonably achievable. COMPARISON: None HISTORY: ORDERING SYSTEM PROVIDED HISTORY: ABDOMINAL PAIN TECHNOLOGIST PROVIDED HISTORY: Ordering Physician Provided Reason for Exam: Atypical Epigastric pain this early morning Acuity: Acute Type of Exam: Initial FINDINGS: Lower Chest: Dependent lung changes. Organs: Evaluation of the solid organs is limited without intravenous contrast. Postsurgical changes within the upper abdomen from history of liver transplant. No liver lesion. Mild pneumobilia. Cholecystectomy. Pancreatic atrophy. No definite pancreatic lesion. No splenomegaly. No adrenal lesion. No hydronephrosis. Bilateral renal atrophy. No renal calculus. Multiple left renal cysts. GI/Bowel: Small bowel anastomosis. Mildly dilated loop of small bowel proximal to the anastomosis. No acute inflammatory process. Pelvis: No free fluid. No bladder calculus. Peritoneum/Retroperitoneum: Atherosclerotic calcification of the abdominal aorta without aneurysmal dilatation. No adenopathy. Bones/Soft Tissues: Postsurgical changes along the lower anterior abdominal wall. No acute abnormality. Diffuse osteopenia. Lumbar spine degenerative changes. Mildly dilated loop of small bowel proximal to the anastomosis. Findings may be on the basis of delayed transit versus mild partial small bowel obstruction. No high-grade obstruction. Xr Abdomen (kub) (single Ap View)    Result Date: 4/20/2019  EXAMINATION: SINGLE SUPINE XRAY VIEW(S) OF THE ABDOMEN 4/20/2019 7:32 am COMPARISON: April 19, 2019 HISTORY: ORDERING SYSTEM PROVIDED HISTORY: SBO TECHNOLOGIST PROVIDED HISTORY: SBO Acuity: Acute Type of Exam: Unknown FINDINGS: Enteric tube with the tip within the stomach. Moderate stool volume. No abnormally dilated loops of small bowel.      No abnormally dilated loops of small bowel.     Xr Abdomen For Ng/og/ne Tube Placement    Result Date: 4/19/2019  EXAMINATION: SINGLE SUPINE XRAY VIEW(S) OF THE ABDOMEN 4/19/2019 12:42 pm COMPARISON: None. HISTORY: ORDERING SYSTEM PROVIDED HISTORY: Confirmation of course of NG/OG/NE tube and location of tip of tube TECHNOLOGIST PROVIDED HISTORY: Confirmation of course of NG/OG/NE tube and location of tip of tube Portable? ->Yes Ordering Physician Provided Reason for Exam: NG Placement Acuity: Acute Type of Exam: Unknown Additional signs and symptoms: NG Placement FINDINGS: The enteric tube tip and side hole terminating in the body of the stomach. Nondilated small and large bowel gas pattern. Mild stool burden in the distal colon. Surgical clips project in the mid abdomen. The lung bases reveal no acute findings. Enteric tube tip and side hole terminate in the body of the stomach. Assessment:  1. Acute kidney injury, likely prerenal, resolved  2. Mild hyponatremia  3. acidemia   4. Hypopharyngeal edema   5. SBO  6. H/o UC  7. Anemia   8. HTN       Plan:  1. Decrease IVF   2. IRON STUDIES      Please do not hesitate to call with questions.     Electronically signed by Tristan Hawkins MD on 4/23/2019 at 8:43 AM

## 2019-04-23 NOTE — PROGRESS NOTES
Occupational Therapy  Facility/Department: [facility]  Daily Treatment Note  NAME: Gonzalez Pleitez  : 1950  MRN: [id number]    Date of Service: 2019    Discharge Recommendations:  Home with assist PRN    Pt demo understanding of edu provided and has reached PLOF and safety in occupational performance. Discharge pt from OT services, will resume skilled OT services if needed. Assessment   Performance deficits / Impairments: Decreased functional mobility ; Decreased ADL status; Decreased balance;Decreased endurance  Prognosis: Good  Patient Education: Education provided on OT POC including goals, treatment interventions, and discharge plan/recommendations. Written and verbal edu provided on safe ADL completion techniques and tips during bathing/showering, and toileting; EC/WS techniques of pacing, posturing, body mechanics, planning and organizing tasks, avoiding fatigue, and task simplification in regards to ADLs of eating, grooming, bathing/showering, dressing, and IADLs of cooking, meal cleanup, marketing and meal planning, laundry, bed making, and housework. Written and verbal edu provided regarding fall prevention in the areas of community safety, transportation, proper footwear and clothing, reducing risk of falls, environmental modifications, importance of exercise, consequences of falling, plan if a fall occurs (\"rest and wait\" vs \"up and about\"). Patient verbalize and/or demonstrate good engagement and understanding of edu provided. REQUIRES OT FOLLOW UP: No  Activity Tolerance  Activity Tolerance: Patient Tolerated treatment well  Safety Devices  Safety Devices in place: Yes  Type of devices: Call light within reach;Nurse notified; All fall risk precautions in place; Patient at risk for falls; Left in bed         Patient Diagnosis(es): The primary encounter diagnosis was SBO (small bowel obstruction) ([facility] [address] A diagnosis of Generalized abdominal pain was also pertinent to this visit.       has a past medical history of Liver transplant recipient [facility]), Sickle cell trait ([facility] [address] Tachycardia, and Ulcerative colitis ([facility] [address] has a past surgical history that includes Liver transplant ([id number]); hernia repair; Cholecystectomy (1987); and Total knee arthroplasty (Left, 2018). Restrictions  Restrictions/Precautions  Restrictions/Precautions: General Precautions  Required Braces or Orthoses?: No  Position Activity Restriction  Other position/activity restrictions: Up with assist, IV, NG  Subjective   General  Chart Reviewed: Yes  Patient assessed for rehabilitation services?: Yes  Response to previous treatment: Patient with no complaints from previous session  Family / Caregiver Present: No      Orientation  Orientation  Overall Orientation Status: Within Functional Limits  Objective    ADL  Additional Comments: Patient and student nurse reports patient completed shower, dressing and grooming this AM with setup/sup. No SOB or difficulty. Balance  Sitting Balance: Independent  Bed mobility  Supine to Sit: Independent  Scooting: Independent                          Cognition  Overall Cognitive Status: WFL     Perception  Overall Perceptual Status: WFL        Additional Activities Comment  Additional Activities:   Upon writer exit, call light within reach, pt retired to bed. All lines intact and patient positioned comfortably. All patient needs addressed prior to ending therapy session. Chart reviewed prior to treatment and patient is agreeable for therapy. RN reports patient is medically stable for therapy treatment this date.      Type of ROM/Therapeutic Exercise  Comment: Verbalize good understanding of HEP provided and completed at previous tx                    Plan   Plan  Times per week: 4-5x/week  Current Treatment Recommendations: Strengthening, Balance Training, Functional Mobility Training, Endurance Training, Equipment Evaluation, Education, & procurement, Patient/Caregiver Education &

## 2019-04-23 NOTE — PROGRESS NOTES
Tiffanie Sams is a 77 y.o. female patient.     Current Facility-Administered Medications   Medication Dose Route Frequency Provider Last Rate Last Dose    ipratropium-albuterol (DUONEB) nebulizer solution 1 ampule  1 ampule Inhalation Q4H While awake Christina Chambers MD   1 ampule at 04/22/19 1518    hydrocortisone sodium succinate PF (SOLU-CORTEF) injection 50 mg  50 mg Intravenous Q8H Christina Chambers MD   50 mg at 04/22/19 1851    cefTRIAXone (ROCEPHIN) 1 g IVPB in 50 mL D5W minibag  1 g Intravenous Q24H Christina Chambers MD   Stopped at 04/22/19 1403    [START ON 4/23/2019] tacrolimus (PROGRAF) capsule 1 mg  1 mg Sublingual QAM Christina Chambers MD        tacrolimus (PROGRAF) capsule 2 mg  2 mg Oral Nightly Christina Chambers MD        potassium phosphate 10 mmol in dextrose 5 % 250 mL IVPB  10 mmol Intravenous Once Eron Alfaro MD        magnesium sulfate 1 g in dextrose 5% 100 mL IVPB  1 g Intravenous Q1H Eron Alfaro MD        metoprolol (LOPRESSOR) injection 5 mg  5 mg Intravenous Q6H Christina Chambers MD   5 mg at 04/22/19 1612    0.45 % sodium chloride infusion   Intravenous Continuous Stone Hollingsworth  mL/hr at 04/22/19 1332      benzocaine-menthol (CEPACOL SORE THROAT) lozenge 1 lozenge  1 lozenge Oral Q2H PRN Christina Chambers MD   1 lozenge at 04/22/19 1024    famotidine (PEPCID) injection 20 mg  20 mg Intravenous Daily Christina Chambers MD   20 mg at 04/22/19 0953    ondansetron (ZOFRAN) injection 4 mg  4 mg Intravenous Once Luis Fernando Bansal MD        sodium chloride flush 0.9 % injection 10 mL  10 mL Intravenous 2 times per day Candis Dominique MD        sodium chloride flush 0.9 % injection 10 mL  10 mL Intravenous PRN Christina Chambers MD        acetaminophen (TYLENOL) tablet 650 mg  650 mg Oral Q4H PRN Christina Chambers MD        ondansetron (ZOFRAN-ODT) disintegrating tablet 4 mg  4 mg Oral Q6H PRN Christina Chambers MD        Or    ondansetron (ZOFRAN) injection 4 mg  4 mg Intravenous Q6H PRN Candis Dominique MD   4 normal respiratory rate. Breath sounds clear to auscultation. She is not in respiratory distress. No stridor. There are decreased breath sounds. No rales. Heart: Normal rate. Regular rhythm. S1 normal and S2 normal.    Chest: No chest wall tenderness. Abdomen: Abdomen is soft and non-distended. Hypoactive bowel sounds. There is no right lower quadrant or left lower quadrant tenderness. There is no rebound tenderness. There is no guarding. There is no splenomegaly. There is no hepatomegaly. Extremities: Decreased range of motion. There is no local swelling. Neurological: Patient is alert and oriented to person, place and time. Pupils:  Pupils are equal, round, and reactive to light. Skin:  Warm and dry. Assessment:    Condition: In serious condition. Improving. (Patient Active Problem List:     SBO (small bowel obstruction) (HCC)     Ulcerative colitis ([facility] [address]     Liver transplant recipient [facility])     Benign essential HTN     Sickle cell trait (HCC)     KAYLA (acute kidney injury) ([facility] [address]     Stage 3 chronic kidney disease (HCC)     Elevated LFTs     Severe malnutrition ([facility] [address]    ). Plan:   (Pt uncomfortable  C/o difficulty in swallowing  Spitting saliva  Sitting up in bed  NG suction was DC d earlier today  Erythema  cerv LVs  Consulted on phone w Dr. Linda Avendano  Resumed suction for now  Will consult ENT  Ileus vs Ac exac UC  On steroids  Dose increased  Check TSH, T4, Ca, Mg  Adv up in chair  Ambulate  D/w nursing, 3C).        Crista Abel MD  4/22/2019

## 2019-04-23 NOTE — PROGRESS NOTES
Dr. Jia Ureña was in to see the pt. NG remains clamped. The pt denies nausea at this time. Will cont to monitor.

## 2019-04-23 NOTE — PROGRESS NOTES
Transitions of Care Pharmacy Service   Medication Review    The patient's list of current home medications has been reviewed. Source(s) of information: Patient, Elisabeth    Based on information provided by the above source(s), I have updated the patient's home med list as described below. I changed or updated the following medications on the patient's home medication list:  Discontinued Actanol PO - patient no longer uses     Added Atorvastatin 20mg - daily  Mesalamine 800mg DR - 2TID  Vitamin B-12 (unknown strength) - daily      Adjusted   Tylenol 500mg - changed from one prn to 2 nightly  Ferrous Sulface 325mg - changed from daily to BID     Other Notes Patient gets her Prograf 1mg through [facility]           Please feel free to call me with any questions about this encounter. Thank you. This note will be reviewed and co-signed by the Transitions of Care Pharmacist. The pharmacist will review inpatient orders and contact the physician about any discrepancies. Juan M King, pharmacy technician  Transitions of [facility] Pharmacy Service  Phone:  [phone]  Fax: [phone]      Electronically signed by Juan M King on 4/23/2019 at 12:06 PM       Prior to Admission medications    Medication Sig Start Date End Date Taking?  Authorizing Provider   mesalamine (DELZICOL) 800 MG TBEC TBEC tablet Take 1,600 mg by mouth 3 times daily        atorvastatin (LIPITOR) 20 MG tablet Take 20 mg by mouth daily       Cyanocobalamin (VITAMIN B 12 PO) Take 1 tablet by mouth daily       aspirin 81 MG chewable tablet Take 81 mg by mouth daily       docusate sodium (COLACE) 100 MG capsule Take 100 mg by mouth as needed for Constipation        ursodiol (ACTIGALL) 300 MG capsule Take 600 mg by mouth 2 times daily       metoprolol succinate (TOPROL XL) 25 MG extended release tablet Take 25 mg by mouth nightly        tacrolimus (PROGRAF) 1 MG capsule Take 2 mg by mouth 2 times daily        conjugated estrogens (PREMARIN) 0.625 MG/GM vaginal cream Place 0.5 g vaginally as needed        acetaminophen (TYLENOL) 500 MG tablet Take 1,000 mg by mouth nightly as needed (Sleep/Pain)        Cholecalciferol-Vitamin C (VITAMIN D3-VITAMIN C) 1000-500 UNIT-MG CAPS Take 1 tablet by mouth 2 times daily       ferrous sulfate 325 (65 Fe) MG tablet Take 325 mg by mouth 2 times daily        Multiple Vitamins-Iron (ONE DAILY MULTIVITAMIN/IRON PO) Take 1 tablet by mouth daily

## 2019-04-23 NOTE — PROGRESS NOTES
([facility] [address]    KAYLA (acute kidney injury) ([facility] [address]    Stage 3 chronic kidney disease (HCC)    Elevated LFTs    Acute pharyngitis  Resolved Problems:    * No resolved hospital problems.  *    Ileus, resolving  Full liquids started by gastroenterology  Up as tolerated  We'll continue to follow

## 2019-04-23 NOTE — FLOWSHEET NOTE
04/22/19 8009   Provider Notification   Reason for Communication New orders  (KUB results )   Provider Name Dr. Saul Drivers    Provider Notification Physician   Method of Communication Secure Message   Response See orders  (restart NG to [facility])   Notification Time 1077

## 2019-04-23 NOTE — FLOWSHEET NOTE
Patient was busy. .. tried twice to visit.  leaves prayer card form possible follow up.  shares in silent prayer for patient.      04/23/19 1258   Encounter Summary   Services provided to: Patient   Referral/Consult From: Kristian Be Visiting   (4-23-19)   Complexity of Encounter Low   Length of Encounter 15 minutes   Spiritual Assessment Completed Yes   Routine   Type Follow up   Assessment Approachable;Calm   Intervention Prayer   Outcome Expressed gratitude

## 2019-04-23 NOTE — PROGRESS NOTES
ALIA Owens MD   1 spray at 04/22/19 0045    heparin (porcine) injection 5,000 Units  5,000 Units Subcutaneous BID Aniceto Burnette MD   5,000 Units at 04/22/19 2145     Allergies   Allergen Reactions    Codeine Other (See Comments)     Patient becomes very lethargic and BP drops     Compazine [Prochlorperazine] Other (See Comments)     Lock jaw     Morphine Other (See Comments)     Patient becomes very lethargic and BP drops     Moxifloxacin Rash     Principal Problem:    [facility]  Active Problems:    SBO (small bowel obstruction) (HCC)    Liver transplant recipient [facility])    Benign essential HTN    Sickle cell trait (HCC)    KAYLA (acute kidney injury) ([facility]    Stage 3 chronic kidney disease (HCC)    Elevated LFTs  Resolved Problems:    * No resolved hospital problems. *    Blood pressure (!) 150/90, pulse 79, temperature 98 °F (36.7 °C), temperature source Oral, resp. rate 16, height 5' 7\" (1.702 m), weight 182 lb 9.6 oz (82.8 kg), SpO2 99 %. Subjective:  Symptoms:  Improved. She reports weakness and anorexia. No shortness of breath, malaise, cough, chest pain, headache, chest pressure, diarrhea or anxiety. Diet:  Poor intake. Activity level: Impaired due to weakness. Pain:  She complains of pain that is mild. She reports pain is improving. Pain is well controlled. Objective:  General Appearance:  Comfortable, ill-appearing, in no acute distress and not in pain. Vital signs: (most recent): Blood pressure (!) 150/90, pulse 79, temperature 98 °F (36.7 °C), temperature source Oral, resp. rate 16, height 5' 7\" (1.702 m), weight 182 lb 9.6 oz (82.8 kg), SpO2 99 %. Output: Producing urine and no stool output. HEENT: Normal HEENT exam.    Lungs:  Normal effort and normal respiratory rate. Breath sounds clear to auscultation. She is not in respiratory distress. No stridor. There are decreased breath sounds. No rales. Heart: Normal rate. Regular rhythm.   S1 normal and S2 normal.    Chest: No chest wall tenderness. Abdomen: Abdomen is soft and non-distended. Hypoactive bowel sounds. There is no right lower quadrant or left lower quadrant tenderness. There is no rebound tenderness. There is no guarding. There is no splenomegaly. There is no hepatomegaly. Extremities: Decreased range of motion. There is no local swelling. Neurological: Patient is alert and oriented to person, place and time. Pupils:  Pupils are equal, round, and reactive to light. Skin:  Warm and dry. Basic Metabolic Panel [[id number]] (Abnormal) Collected: 04/23/19 0507   Updated: 04/23/19 0629    Specimen Source: Blood     Glucose 172High  mg/dL    BUN 16 mg/dL    CREATININE 0. 94High  mg/dL    Bun/Cre Ratio 17    Calcium 8.2Low  mg/dL    Sodium 133Low  mmol/L    Potassium 3.9 mmol/L    Chloride 99 mmol/L    CO2 17Low  mmol/L    Anion Gap 17 mmol/L    GFR Non-African American 59Low  mL/min    GFR African American >60 mL/min    GFR Comment         Comment: Average GFR for 79or more years old:    65 mL/min/1.73sq m   Chronic Kidney Disease:    <60 mL/min/1.73sq m   Kidney failure:    <15 mL/min/1.73sq m               eGFR calculated using average adult body mass.  Additional eGFR calculator available at:         [email or web address]              GFR Staging NOT REPORTED   Magnesium [[id number]] Collected: 04/23/19 0507   Updated: 04/23/19 0629     Magnesium 1.8 mg/dL   Phosphorus [[id number]] Collected: 04/23/19 0507   Updated: 04/23/19 0629    Specimen Source: Blood     Phosphorus 3.9 mg/dL   CBC Auto Differential [[id number]] (Abnormal) Collected: 04/23/19 0507   Updated: 04/23/19 0558    Specimen Source: Blood     WBC 3.0Low  k/uL    RBC 3.98Low  m/uL    Hemoglobin 9.4Low  g/dL    Hematocrit 28.7Low  %    MCV 72.0Low  fL    MCH 23.7Low  pg    MCHC 32.9 g/dL    RDW 14.3 %    Platelets 030 k/uL    MPV NOT REPORTED fL    NRBC Automated NOT REPORTED per 100 LVH, may be normal variant  Borderline ECG  When compared with ECG of 19-APR-2019 06:35, (unconfirmed)  Premature ventricular complexes are no longer Present  Questionable change in QRS axis  T wave inversion no longer evident in Lateral leads   Thyroid Stim. Horm. [[id number]] Collected: 04/22/19 0831   Updated: 04/22/19 0915    Specimen Source: Blood     TSH 0.72 mIU/L   Magnesium [[id number]] (Abnormal) Collected: 04/22/19 0831   Updated: 04/22/19 0903    Specimen Source: Blood     Magnesium 1.5Low  mg/dL   XR ABDOMEN (KUB) (SINGLE AP VIEW) [[id number]] Collected: 04/22/19 0828   Updated: 04/22/19 0836    Narrative:     EXAMINATION:  SINGLE SUPINE XRAY VIEW(S) OF THE ABDOMEN    4/22/2019 8:25 am    COMPARISON:  04/20/2018    HISTORY:  ORDERING SYSTEM PROVIDED HISTORY: SBO  TECHNOLOGIST PROVIDED HISTORY:  SBO  Acuity: Acute  Type of Exam: Unknown    FINDINGS:  Nasogastric tube tip remains in the stomach.  Scattered gas within nondilated  bowel loops throughout the abdomen.  No gas-filled dilated small bowel loops  to suggest small bowel obstruction.  There are clips in the upper abdomen in  stable calcifications in the pelvis.  Bones are osteopenic with degenerative  changes lower lumbar spine. Impression:     Relatively stable exam with a nonspecific nonobstructive bowel gas pattern. Assessment:    Condition: In serious condition. Improving. (Patient Active Problem List:     SBO (small bowel obstruction) (HCC)     Ulcerative colitis ([facility] [address]     Liver transplant recipient [facility])     Benign essential HTN     Sickle cell trait (HCC)     KAYLA (acute kidney injury) ([facility] [address]     Stage 3 chronic kidney disease (HCC)     Elevated LFTs     Severe malnutrition (HCC)     Acute pharyngitis    ).      Plan:   (Pt much better since yesterday  Erythema improved  On Abx for Pharyngitis  NG was clamped  Now passing flatus  No BM yet  May DC NG later today  Ac exac UC  Steroids IV  HTN stable  Adv up in chair  Ambulate  DC plan once OK w all).        Crista Abel MD  4/23/2019

## 2019-04-23 NOTE — PROGRESS NOTES
Physical Therapy  Facility/Department: [facility]  Daily Treatment Note  NAME: Michael Grace  : 1950  MRN: [id number]    Date of Service: 2019    Discharge Recommendations:  Home with assist PRN, Outpatient PT   PT Equipment Recommendations  Equipment Needed: No    Patient Diagnosis(es): The primary encounter diagnosis was SBO (small bowel obstruction) ([facility] [address] A diagnosis of Generalized abdominal pain was also pertinent to this visit. has a past medical history of Liver transplant recipient [facility]), Sickle cell trait ([facility] [address] Tachycardia, and [facility] has a past surgical history that includes Liver transplant ([id number],3385); hernia repair; Cholecystectomy (); and Total knee arthroplasty (Left, 2018). Restrictions  Restrictions/Precautions  Restrictions/Precautions: General Precautions  Required Braces or Orthoses?: No  Position Activity Restriction  Other position/activity restrictions: Up with assist, IV, NG  Subjective   General  Chart Reviewed: Yes  Family / Caregiver Present: No  Subjective  Subjective: Patient is agreeable for PT treatment. General Comment  Comments: RNRadha reports patient is medically stable for PT treatment.    Pain Screening  Patient Currently in Pain: No  Pain Assessment  Pain Assessment: 0-10  Pain Level: 0  Vital Signs  Patient Currently in Pain: No       Orientation  Orientation  Overall Orientation Status: Within Functional Limits  Cognition   Cognition  Overall Cognitive Status: WFL  Cognition Comment: Pt often repeating self  Objective   Bed mobility  Rolling to Left: Independent  Rolling to Right: Independent  Supine to Sit: Independent  Sit to Supine: Independent  Scooting: Independent  Transfers  Sit to Stand: Stand by assistance  Stand to sit: Stand by assistance  Stand Pivot Transfers: Stand by assistance  Lateral Transfers: Stand by assistance  Ambulation  Ambulation?: Yes  More Ambulation?: Yes  Ambulation 1  Surface: level tile  Device: No Device  Assistance: Contact guard assistance  Quality of Gait: slow gait, mildly antalgic  Distance: 50' x 2  Comments: Patient ambulated to the stairs then back to room for a seated rest breaks   Ambulation 2  Surface - 2: level tile  Device 2: Rolling Walker  Assistance 2: Stand by assistance  Quality of Gait 2: Improved gait pattern with RW, 1 episode of retro buckling noted for Rt LE use of RW preveneted patient from falling. Distance: 150' x 1  Comments: Discussed why RW would be safety for patient as her Rt knee tends to buckling during mobility. Stairs/Curb  Stairs?: Yes  Stairs  # Steps : 13  Stairs Height: 6\"  Rails: Left ascending  Device: No Device  Assistance: Stand by assistance  Comment: Good understanding of safety during stair training. Balance  Posture: Good  Sitting - Static: Good  Sitting - Dynamic: Good  Standing - Static: Good  Standing - Dynamic: Good;+  Comments: With RW for standing balance  Exercises  Comments: Standing rachna LE AROM x 10 reps with no rest breaks needed today. Patient issues standing written HEP to review. Assessment   Body structures, Functions, Activity limitations: Decreased functional mobility ; Decreased balance  Assessment: Patient is appropriate for home meghna PRN when medically stable for PT treatment. Possibly appropriate for OP PT due to Rt knee buckling during mobility, would benefit for knee strengthing and stabilization to improve mobility. Prognosis: Excellent  Patient Education: Written standing HEP issued to patient, Verbally and physically reviewed with good understanding.    REQUIRES PT FOLLOW UP: Yes  Activity Tolerance  Activity Tolerance: Patient Tolerated treatment well     AM-PAC Score  AM-PAC Inpatient Mobility Raw Score : 22  AM-PAC Inpatient T-Scale Score : 53.28  Mobility Inpatient CMS 0-100% Score: 20.91  Mobility Inpatient CMS G-Code Modifier : CJ          Goals  Short term goals  Time Frame for Short term goals: 10 visits  Short term goal 1: Pt mod indep with all transfers and bed mobility  Short term goal 2: Pt amb 200 ft with cane mod indep  Short term goal 3: Pt negotiate 12 stairs with 1 rail and supervision  Short term goal 4: Pt improve standing balance to good  Patient Goals   Patient goals :  To feel better    Plan    Plan  Times per week: 1-2x/day, 5-6 days a week  Current Treatment Recommendations: Strengthening, Transfer Training, Endurance Training, Balance Training, Gait Training, Stair training, Functional Mobility Training  Safety Devices  Type of devices: Call light within reach, Left in chair, Nurse notified, Gait belt  Restraints  Initially in place: No     Therapy Time   Individual Concurrent Group Co-treatment   Time In 1438         Time Out 1522         Minutes [address] [place], [place]

## 2019-04-24 LAB
ABSOLUTE EOS #: 0 K/UL (ref 0–0.4)
ABSOLUTE IMMATURE GRANULOCYTE: ABNORMAL K/UL (ref 0–0.3)
ABSOLUTE LYMPH #: 1.2 K/UL (ref 1–4.8)
ABSOLUTE MONO #: 0.2 K/UL (ref 0.2–0.8)
ANION GAP SERPL CALCULATED.3IONS-SCNC: 14 MMOL/L (ref 9–17)
BASOPHILS # BLD: 0 % (ref 0–2)
BASOPHILS ABSOLUTE: 0 K/UL (ref 0–0.2)
BUN BLDV-MCNC: 11 MG/DL (ref 8–23)
BUN/CREAT BLD: 14 (ref 9–20)
C-REACTIVE PROTEIN: 17.2 MG/L (ref 0–5)
CALCIUM SERPL-MCNC: 8.8 MG/DL (ref 8.6–10.4)
CHLORIDE BLD-SCNC: 102 MMOL/L (ref 98–107)
CO2: 20 MMOL/L (ref 20–31)
CREAT SERPL-MCNC: 0.79 MG/DL (ref 0.5–0.9)
DIFFERENTIAL TYPE: ABNORMAL
EOSINOPHILS RELATIVE PERCENT: 1 % (ref 1–4)
GFR AFRICAN AMERICAN: >60 ML/MIN
GFR NON-AFRICAN AMERICAN: >60 ML/MIN
GFR SERPL CREATININE-BSD FRML MDRD: ABNORMAL ML/MIN/{1.73_M2}
GFR SERPL CREATININE-BSD FRML MDRD: ABNORMAL ML/MIN/{1.73_M2}
GLUCOSE BLD-MCNC: 168 MG/DL (ref 70–99)
HCT VFR BLD CALC: 32 % (ref 36–46)
HEMOGLOBIN: 10.2 G/DL (ref 12–16)
IMMATURE GRANULOCYTES: ABNORMAL %
LYMPHOCYTES # BLD: 41 % (ref 24–44)
MAGNESIUM: 1.5 MG/DL (ref 1.6–2.6)
MCH RBC QN AUTO: 23.2 PG (ref 26–34)
MCHC RBC AUTO-ENTMCNC: 31.8 G/DL (ref 31–37)
MCV RBC AUTO: 72.9 FL (ref 80–100)
MONOCYTES # BLD: 8 % (ref 1–7)
NRBC AUTOMATED: ABNORMAL PER 100 WBC
PDW BLD-RTO: 14.4 % (ref 11.5–14.5)
PHOSPHORUS: 2.3 MG/DL (ref 2.6–4.5)
PLATELET # BLD: 203 K/UL (ref 130–400)
PLATELET ESTIMATE: ABNORMAL
PMV BLD AUTO: ABNORMAL FL (ref 6–12)
POTASSIUM SERPL-SCNC: 3.5 MMOL/L (ref 3.7–5.3)
PROGRAF: 4.4 NG/ML (ref 5–20)
RBC # BLD: 4.39 M/UL (ref 4–5.2)
RBC # BLD: ABNORMAL 10*6/UL
SEG NEUTROPHILS: 50 % (ref 36–66)
SEGMENTED NEUTROPHILS ABSOLUTE COUNT: 1.6 K/UL (ref 1.8–7.7)
SODIUM BLD-SCNC: 136 MMOL/L (ref 135–144)
WBC # BLD: 3 K/UL (ref 3.5–11)
WBC # BLD: ABNORMAL 10*3/UL

## 2019-04-24 PROCEDURE — 1200000000 HC SEMI PRIVATE

## 2019-04-24 PROCEDURE — 84100 ASSAY OF PHOSPHORUS: CPT

## 2019-04-24 PROCEDURE — APPNB30 APP NON BILLABLE TIME 0-30 MINS: Performed by: NURSE PRACTITIONER

## 2019-04-24 PROCEDURE — 80048 BASIC METABOLIC PNL TOTAL CA: CPT

## 2019-04-24 PROCEDURE — 6360000002 HC RX W HCPCS: Performed by: FAMILY MEDICINE

## 2019-04-24 PROCEDURE — 86140 C-REACTIVE PROTEIN: CPT

## 2019-04-24 PROCEDURE — 36415 COLL VENOUS BLD VENIPUNCTURE: CPT

## 2019-04-24 PROCEDURE — 2580000003 HC RX 258: Performed by: FAMILY MEDICINE

## 2019-04-24 PROCEDURE — 83735 ASSAY OF MAGNESIUM: CPT

## 2019-04-24 PROCEDURE — 6360000002 HC RX W HCPCS: Performed by: INTERNAL MEDICINE

## 2019-04-24 PROCEDURE — 85025 COMPLETE CBC W/AUTO DIFF WBC: CPT

## 2019-04-24 PROCEDURE — 83630 LACTOFERRIN FECAL (QUAL): CPT

## 2019-04-24 PROCEDURE — 6370000000 HC RX 637 (ALT 250 FOR IP): Performed by: NURSE PRACTITIONER

## 2019-04-24 PROCEDURE — 2500000003 HC RX 250 WO HCPCS: Performed by: FAMILY MEDICINE

## 2019-04-24 PROCEDURE — 87506 IADNA-DNA/RNA PROBE TQ 6-11: CPT

## 2019-04-24 PROCEDURE — 2580000003 HC RX 258: Performed by: INTERNAL MEDICINE

## 2019-04-24 PROCEDURE — 99232 SBSQ HOSP IP/OBS MODERATE 35: CPT | Performed by: INTERNAL MEDICINE

## 2019-04-24 PROCEDURE — G0328 FECAL BLOOD SCRN IMMUNOASSAY: HCPCS

## 2019-04-24 PROCEDURE — 80197 ASSAY OF TACROLIMUS: CPT

## 2019-04-24 RX ORDER — MAGNESIUM SULFATE 1 G/100ML
1 INJECTION INTRAVENOUS
Status: DISCONTINUED | OUTPATIENT
Start: 2019-04-24 | End: 2019-04-24

## 2019-04-24 RX ORDER — TACROLIMUS 1 MG/1
1 CAPSULE ORAL EVERY MORNING
Status: DISCONTINUED | OUTPATIENT
Start: 2019-04-25 | End: 2019-04-26 | Stop reason: HOSPADM

## 2019-04-24 RX ORDER — HYDROCORTISONE 5 MG/1
40 TABLET ORAL DAILY
Status: DISCONTINUED | OUTPATIENT
Start: 2019-04-25 | End: 2019-04-25

## 2019-04-24 RX ORDER — HYDROCORTISONE 10 MG/1
40 TABLET ORAL DAILY
Status: DISCONTINUED | OUTPATIENT
Start: 2019-04-25 | End: 2019-04-24 | Stop reason: SDUPTHER

## 2019-04-24 RX ORDER — LANOLIN ALCOHOL/MO/W.PET/CERES
325 CREAM (GRAM) TOPICAL
Status: DISCONTINUED | OUTPATIENT
Start: 2019-04-25 | End: 2019-04-26 | Stop reason: HOSPADM

## 2019-04-24 RX ORDER — BISACODYL 10 MG
10 SUPPOSITORY, RECTAL RECTAL ONCE
Status: COMPLETED | OUTPATIENT
Start: 2019-04-24 | End: 2019-04-24

## 2019-04-24 RX ORDER — IPRATROPIUM BROMIDE AND ALBUTEROL SULFATE 2.5; .5 MG/3ML; MG/3ML
1 SOLUTION RESPIRATORY (INHALATION)
Status: DISCONTINUED | OUTPATIENT
Start: 2019-04-25 | End: 2019-04-26 | Stop reason: HOSPADM

## 2019-04-24 RX ORDER — MAGNESIUM SULFATE 1 G/100ML
1 INJECTION INTRAVENOUS
Status: COMPLETED | OUTPATIENT
Start: 2019-04-24 | End: 2019-04-24

## 2019-04-24 RX ORDER — POTASSIUM CHLORIDE 7.45 MG/ML
20 INJECTION INTRAVENOUS ONCE
Status: DISCONTINUED | OUTPATIENT
Start: 2019-04-24 | End: 2019-04-24

## 2019-04-24 RX ORDER — POTASSIUM CHLORIDE 7.45 MG/ML
10 INJECTION INTRAVENOUS
Status: COMPLETED | OUTPATIENT
Start: 2019-04-24 | End: 2019-04-24

## 2019-04-24 RX ADMIN — METOPROLOL TARTRATE 5 MG: 5 INJECTION INTRAVENOUS at 21:26

## 2019-04-24 RX ADMIN — ONDANSETRON 4 MG: 2 INJECTION INTRAMUSCULAR; INTRAVENOUS at 21:02

## 2019-04-24 RX ADMIN — HYDROCORTISONE SODIUM SUCCINATE 50 MG: 100 INJECTION, POWDER, FOR SOLUTION INTRAMUSCULAR; INTRAVENOUS at 02:42

## 2019-04-24 RX ADMIN — SODIUM CHLORIDE: 9 INJECTION, SOLUTION INTRAVENOUS at 02:43

## 2019-04-24 RX ADMIN — BISACODYL 10 MG: 10 SUPPOSITORY RECTAL at 19:22

## 2019-04-24 RX ADMIN — HEPARIN SODIUM 5000 UNITS: 5000 INJECTION, SOLUTION INTRAVENOUS; SUBCUTANEOUS at 21:26

## 2019-04-24 RX ADMIN — METOPROLOL TARTRATE 5 MG: 5 INJECTION INTRAVENOUS at 02:42

## 2019-04-24 RX ADMIN — TACROLIMUS 2 MG: 1 CAPSULE ORAL at 21:25

## 2019-04-24 RX ADMIN — FAMOTIDINE 20 MG: 10 INJECTION, SOLUTION INTRAVENOUS at 10:09

## 2019-04-24 RX ADMIN — METOPROLOL TARTRATE 5 MG: 5 INJECTION INTRAVENOUS at 18:15

## 2019-04-24 RX ADMIN — MAGNESIUM SULFATE HEPTAHYDRATE 1 G: 1 INJECTION, SOLUTION INTRAVENOUS at 16:58

## 2019-04-24 RX ADMIN — METOPROLOL TARTRATE 5 MG: 5 INJECTION INTRAVENOUS at 09:39

## 2019-04-24 RX ADMIN — HYDROCORTISONE SODIUM SUCCINATE 50 MG: 100 INJECTION, POWDER, FOR SOLUTION INTRAMUSCULAR; INTRAVENOUS at 09:39

## 2019-04-24 RX ADMIN — POTASSIUM CHLORIDE 10 MEQ: 7.46 INJECTION, SOLUTION INTRAVENOUS at 14:01

## 2019-04-24 RX ADMIN — HEPARIN SODIUM 5000 UNITS: 5000 INJECTION, SOLUTION INTRAVENOUS; SUBCUTANEOUS at 09:39

## 2019-04-24 RX ADMIN — Medication 10 ML: at 21:02

## 2019-04-24 RX ADMIN — MAGNESIUM SULFATE HEPTAHYDRATE 1 G: 1 INJECTION, SOLUTION INTRAVENOUS at 18:16

## 2019-04-24 RX ADMIN — CEFTRIAXONE SODIUM 1 G: 1 INJECTION, POWDER, FOR SOLUTION INTRAMUSCULAR; INTRAVENOUS at 15:31

## 2019-04-24 RX ADMIN — TACROLIMUS 1 MG: 1 CAPSULE ORAL at 09:39

## 2019-04-24 RX ADMIN — POTASSIUM CHLORIDE 10 MEQ: 7.46 INJECTION, SOLUTION INTRAVENOUS at 12:28

## 2019-04-24 ASSESSMENT — ENCOUNTER SYMPTOMS
SHORTNESS OF BREATH: 0
COUGH: 0
DIARRHEA: 0

## 2019-04-24 NOTE — PLAN OF CARE
Problem: Falls - Risk of:  Goal: Will remain free from falls  Description  Will remain free from falls  Outcome: Ongoing     Problem: Falls - Risk of:  Goal: Will remain free from falls  Description  Will remain free from falls  Outcome: Ongoing  Goal: Absence of physical injury  Description  Absence of physical injury  Outcome: Ongoing     Problem: Pain:  Goal: Control of acute pain  Description  Control of acute pain  4/24/2019 0443 by Tyson Smallwood RN  Outcome: Ongoing     Problem:  Bowel/Gastric:  Goal: Control of bowel function will improve  Description  Control of bowel function will improve  4/24/2019 0443 by Tyson Smallwood RN  Outcome: Ongoing  Note:   Patient passing gas, no BM yet, active bowel sounds  Goal: Ability to achieve a regular elimination pattern will improve  Description  Ability to achieve a regular elimination pattern will improve  4/24/2019 0443 by Tyson Smallwood RN  Outcome: Ongoing

## 2019-04-24 NOTE — PROGRESS NOTES
Nephrology Progress Note        Subjective: the patient is doing well, no SOB, on clear liquid diet,no BM, on IVF, BP is stable, afebrile, good UOP    Objective:  CURRENT TEMPERATURE:  Temp: 98.1 °F (36.7 °C)  MAXIMUM TEMPERATURE OVER 24HRS:  Temp (24hrs), Av.1 °F (36.7 °C), Min:98.1 °F (36.7 °C), Max:98.1 °F (36.7 °C)    CURRENT RESPIRATORY RATE:  Resp: 16  CURRENT PULSE:  Pulse: 85  CURRENT BLOOD PRESSURE:  BP: (!) 114/59  24HR BLOOD PRESSURE RANGE:  Systolic (05CSN), AUJ:649 , Min:114 , GIX:734   ; Diastolic (85SUC), QFB:28, Min:59, Max:86    24HR INTAKE/OUTPUT:      Intake/Output Summary (Last 24 hours) at 2019 1126  Last data filed at 2019 0517  Gross per 24 hour   Intake --   Output 800 ml   Net -800 ml     Weight   Wt Readings from Last 3 Encounters:   19 179 lb 6.4 oz (81.4 kg)       Physical Exam:  Awake, alert, in no acute distress  Skin: warm and dry, no rash or erythema  Eyes: conjunctivae normal and sclera anicteric  Pulmonary: clear to auscultation bilaterally- no wheezes, rales or rhonchi, normal air movement, no respiratory distress  Cardiovascular: normal rate and normal S1 and S2  Abdomen: soft nontender, bowel sounds present, no organomegaly,  no ascites  Extremities: no cyanosis, clubbing or edema    Current Medications:      0.9 % sodium chloride infusion Continuous   ipratropium-albuterol (DUONEB) nebulizer solution 1 ampule Q4H While awake   hydrocortisone sodium succinate PF (SOLU-CORTEF) injection 50 mg Q8H   cefTRIAXone (ROCEPHIN) 1 g IVPB in 50 mL D5W minibag Q24H   tacrolimus (PROGRAF) capsule 1 mg QAM   tacrolimus (PROGRAF) capsule 2 mg Nightly   metoprolol (LOPRESSOR) injection 5 mg Q6H   benzocaine-menthol (CEPACOL SORE THROAT) lozenge 1 lozenge Q2H PRN   famotidine (PEPCID) injection 20 mg Daily   ondansetron (ZOFRAN) injection 4 mg Once   sodium chloride flush 0.9 % injection 10 mL 2 times per day   sodium chloride flush 0.9 % injection 10 mL PRN   acetaminophen (TYLENOL) tablet 650 mg Q4H PRN   ondansetron (ZOFRAN-ODT) disintegrating tablet 4 mg Q6H PRN   Or    ondansetron (ZOFRAN) injection 4 mg Q6H PRN   HYDROmorphone (DILAUDID) injection 0.5 mg Q4H PRN   phenol 1.4 % mouth spray 1 spray Q2H PRN   heparin (porcine) injection 5,000 Units BID       Labs:    CBC:  Recent Labs     04/22/19  0525 04/23/19  0507 04/24/19  0510   WBC 4.5 3.0* 3.0*   RBC 4.12 3.98* 4.39   HGB 9.5* 9.4* 10.2*   HCT 29.3* 28.7* 32.0*   MCV 71.1* 72.0* 72.9*   MCH 23.0* 23.7* 23.2*   MCHC 32.4 32.9 31.8   RDW 15.8* 14.3 14.4    165 203   MPV 9.8 NOT REPORTED NOT REPORTED     Chemistry:  Recent Labs     04/22/19  0525 04/22/19  0831 04/23/19  0507 04/24/19  0510     --  133* 136   K 3.5*  --  3.9 3.5*     --  99 102   CO2 17*  --  17* 20   GLUCOSE 74  --  172* 168*   BUN 15  --  16 11   CREATININE 0.96*  --  0.94* 0.79   MG 1.5* 1.5* 1.8 1.5*   ANIONGAP 16  --  17 14   LABGLOM 57*  --  59* >60   GFRAA >60  --  >60 >60   CALCIUM 8.4*  --  8.2* 8.8   CAION  --  1.24  --   --    PHOS 2.6  --  3.9 2.3*     Recent Labs     04/22/19  0831   N2AOCHU 7.5   TSH 0.72     Phosphorus:    Lab Results   Component Value Date    PHOS 2.3 04/24/2019     Magnesium:   Lab Results   Component Value Date    MG 1.5 04/24/2019     Albumin:   Lab Results   Component Value Date    LABALBU 2.9 04/21/2019     Urine Protein:    Lab Results   Component Value Date    PROTEINU 1+ 04/19/2019       Radiology:  Ct Abdomen Pelvis Wo Contrast    Result Date: 4/19/2019  EXAMINATION: CT OF THE ABDOMEN AND PELVIS WITHOUT CONTRAST 4/19/2019 6:38 am TECHNIQUE: CT of the abdomen and pelvis was performed without the administration of intravenous contrast. Multiplanar reformatted images are provided for review. Dose modulation, iterative reconstruction, and/or weight based adjustment of the mA/kV was utilized to reduce the radiation dose to as low as reasonably achievable.  COMPARISON: None HISTORY: ORDERING SYSTEM PROVIDED HISTORY: ABDOMINAL PAIN TECHNOLOGIST PROVIDED HISTORY: Ordering Physician Provided Reason for Exam: Atypical Epigastric pain this early morning Acuity: Acute Type of Exam: Initial FINDINGS: Lower Chest: Dependent lung changes. Organs: Evaluation of the solid organs is limited without intravenous contrast. Postsurgical changes within the upper abdomen from history of liver transplant. No liver lesion. Mild pneumobilia. Cholecystectomy. Pancreatic atrophy. No definite pancreatic lesion. No splenomegaly. No adrenal lesion. No hydronephrosis. Bilateral renal atrophy. No renal calculus. Multiple left renal cysts. GI/Bowel: Small bowel anastomosis. Mildly dilated loop of small bowel proximal to the anastomosis. No acute inflammatory process. Pelvis: No free fluid. No bladder calculus. Peritoneum/Retroperitoneum: Atherosclerotic calcification of the abdominal aorta without aneurysmal dilatation. No adenopathy. Bones/Soft Tissues: Postsurgical changes along the lower anterior abdominal wall. No acute abnormality. Diffuse osteopenia. Lumbar spine degenerative changes. Mildly dilated loop of small bowel proximal to the anastomosis. Findings may be on the basis of delayed transit versus mild partial small bowel obstruction. No high-grade obstruction. Xr Abdomen (kub) (single Ap View)    Result Date: 4/20/2019  EXAMINATION: SINGLE SUPINE XRAY VIEW(S) OF THE ABDOMEN 4/20/2019 7:32 am COMPARISON: April 19, 2019 HISTORY: ORDERING SYSTEM PROVIDED HISTORY: SBO TECHNOLOGIST PROVIDED HISTORY: SBO Acuity: Acute Type of Exam: Unknown FINDINGS: Enteric tube with the tip within the stomach. Moderate stool volume. No abnormally dilated loops of small bowel. No abnormally dilated loops of small bowel. Xr Abdomen For Ng/og/ne Tube Placement    Result Date: 4/19/2019  EXAMINATION: SINGLE SUPINE XRAY VIEW(S) OF THE ABDOMEN 4/19/2019 12:42 pm COMPARISON: None.  HISTORY: ORDERING SYSTEM PROVIDED HISTORY: Confirmation of course of NG/OG/NE tube and location of tip of tube TECHNOLOGIST PROVIDED HISTORY: Confirmation of course of NG/OG/NE tube and location of tip of tube Portable? ->Yes Ordering Physician Provided Reason for Exam: NG Placement Acuity: Acute Type of Exam: Unknown Additional signs and symptoms: NG Placement FINDINGS: The enteric tube tip and side hole terminating in the body of the stomach. Nondilated small and large bowel gas pattern. Mild stool burden in the distal colon. Surgical clips project in the mid abdomen. The lung bases reveal no acute findings. Enteric tube tip and side hole terminate in the body of the stomach. Assessment:  1. Acute kidney injury, likely prerenal, resolved  2. Mild hyponatremia  3. acidemia , BETTER  4. Hypopharyngeal edema   5. SBO  6. H/o UC  7. Anemia , NATALIE  8. HTN, controlled  9. Hypomagnesemia        Plan:  1. Stop IVF  2. Replace potassium and magnesium  3. Start ferrous sulfate      Please do not hesitate to call with questions.     Electronically signed by Dayne Burnette MD on 4/24/2019 at 11:26 AM

## 2019-04-24 NOTE — PROGRESS NOTES
[facility]    Gastroenterology Daily Progress Note      Patient:   Jocelyn Long   :    1950   Facility:   [facility]  Date:     2019  Consultant:   Ron Caldera, CNP      SUBJECTIVE  77 y.o. female admitted 2019 with SBO (small bowel obstruction) ([facility] [address] [K56.609] and seen for SBO with a hx of UC. The pt was seen and examined. She continues to have flatus but no bowel movements. She is tolerating full liquids without pain or nausea. She denies any difficult swallowing.          OBJECTIVE  Scheduled Meds:   [START ON 2019] ferrous sulfate  325 mg Oral Daily with breakfast    magnesium sulfate  1 g Intravenous Q1H    ipratropium-albuterol  1 ampule Inhalation Q4H While awake    hydrocortisone sodium succinate PF  50 mg Intravenous Q8H    cefTRIAXone (ROCEPHIN) IV  1 g Intravenous Q24H    tacrolimus  1 mg Sublingual QAM    tacrolimus  2 mg Oral Nightly    metoprolol  5 mg Intravenous Q6H    famotidine (PEPCID) injection  20 mg Intravenous Daily    ondansetron  4 mg Intravenous Once    sodium chloride flush  10 mL Intravenous 2 times per day    heparin (porcine)  5,000 Units Subcutaneous BID       Vital Signs:  /78   Pulse 78   Temp 97.9 °F (36.6 °C) (Oral)   Resp 16   Ht 5' 7\" (1.702 m)   Wt 179 lb 6.4 oz (81.4 kg)   SpO2 98%   BMI 28.10 kg/m²      Physical Exam:   General appearance: Alert & oriented, NAD  Lungs: CTA bilaterally    Heart: S1S2 RRR  Abdomen: Soft, Nontender, Not distended, BS WNL  Skin: No jaundice, No clubbing, No cyanosis    Lab and Imaging Review     CBC  Recent Labs     19  0525 19  0507 19  0510   WBC 4.5 3.0* 3.0*   HGB 9.5* 9.4* 10.2*   HCT 29.3* 28.7* 32.0*   MCV 71.1* 72.0* 72.9*    165 203       BMP  Recent Labs     19  0525 19  0507 19  0510    133* 136   K 3.5* 3.9 3.5*    99 102   CO2 17* 17* 20   BUN 15 16 11   CREATININE 0.96* 0.94* 0.79   GLUCOSE 74 172* 168*   CALCIUM 8.4* 8.2* 8.8     FINDINGS:KUB 4/22/19   Nasogastric tube tip remains in the stomach.  Scattered gas within nondilated   bowel loops throughout the abdomen.  No gas-filled dilated small bowel loops   to suggest small bowel obstruction.  There are clips in the upper abdomen in   stable calcifications in the pelvis.  Bones are osteopenic with degenerative   changes lower lumbar spine.           Impression   Relatively stable exam with a nonspecific nonobstructive bowel gas pattern.          FINDINGS:ct abd 4/19/19   Lower Chest: Dependent lung changes.       Organs: Evaluation of the solid organs is limited without intravenous   contrast. Postsurgical changes within the upper abdomen from history of liver   transplant.  No liver lesion.  Mild pneumobilia.  Cholecystectomy.    Pancreatic atrophy.  No definite pancreatic lesion.  No splenomegaly.  No   adrenal lesion.  No hydronephrosis.  Bilateral renal atrophy.  No renal   calculus.  Multiple left renal cysts.       GI/Bowel: Small bowel anastomosis.  Mildly dilated loop of small bowel   proximal to the anastomosis.  No acute inflammatory process.       Pelvis: No free fluid.  No bladder calculus.       Peritoneum/Retroperitoneum: Atherosclerotic calcification of the abdominal   aorta without aneurysmal dilatation.  No adenopathy.       Bones/Soft Tissues: Postsurgical changes along the lower anterior abdominal   wall.  No acute abnormality.  Diffuse osteopenia.  Lumbar spine degenerative   changes.           Impression   Mildly dilated loop of small bowel proximal to the anastomosis.  Findings may   be on the basis of delayed transit versus mild partial small bowel   obstruction.  No high-grade obstruction.      Xr Abdomen (kub) (single Ap View)     Result Date: 4/20/2019  EXAMINATION: SINGLE SUPINE XRAY VIEW(S) OF THE ABDOMEN 4/20/2019 7:32 am COMPARISON: April 19, 2019 HISTORY: ORDERING SYSTEM PROVIDED HISTORY: SBO TECHNOLOGIST PROVIDED HISTORY: SBO Acuity: Acute Type of Exam: Unknown FINDINGS: Enteric tube with the tip within the stomach. Moderate stool volume.  No abnormally dilated loops of small bowel.      No abnormally dilated loops of small bowel. ANEMIA STUDIES  Recent Labs     04/23/19  0507   LABIRON 16*   TIBC 146*   FERRITIN 413*         ASSESSMENT/PLAN:  1. Abdominal pain with SBO with hx of UC, pain is resolved   No bowel movement yet, will give dulcolax supp x1 dose today  -advance to low fiber diet  -recommend decreasing the steroids to 40mg daily     2.dysphagia; improved and the pt currently denies this  -abx per ENT        This plan was formulated in collaboration with Dr. Luciana Roger .     Electronically signed by: MILAGRO Hanson - CNP on 4/24/2019 at 4:05 PM

## 2019-04-24 NOTE — PROGRESS NOTES
Abdulaziz Doherty is a 77 y.o. female patient.     Current Facility-Administered Medications   Medication Dose Route Frequency Provider Last Rate Last Dose    [START ON 4/25/2019] ferrous sulfate EC tablet 325 mg  325 mg Oral Daily with breakfast Jorden Narayanan MD        potassium chloride 10 mEq/100 mL IVPB (Peripheral Line)  10 mEq Intravenous Q1H Sharona Dunn  mL/hr at 04/24/19 1228 10 mEq at 04/24/19 1228    magnesium sulfate 1 g in dextrose 5% 100 mL IVPB  1 g Intravenous Q1H Christina Chambers MD        ipratropium-albuterol (DUONEB) nebulizer solution 1 ampule  1 ampule Inhalation Q4H While awake Christina Chambers MD   1 ampule at 04/22/19 2054    hydrocortisone sodium succinate PF (SOLU-CORTEF) injection 50 mg  50 mg Intravenous Q8H Christina Chambers MD   50 mg at 04/24/19 0939    cefTRIAXone (ROCEPHIN) 1 g IVPB in 50 mL D5W minibag  1 g Intravenous Q24H Christina Chambers MD   Stopped at 04/23/19 1412    tacrolimus (PROGRAF) capsule 1 mg  1 mg Sublingual QAM Christina Chambers MD   1 mg at 04/24/19 0939    tacrolimus (PROGRAF) capsule 2 mg  2 mg Oral Nightly Christina Chambers MD   2 mg at 04/23/19 2146    metoprolol (LOPRESSOR) injection 5 mg  5 mg Intravenous Q6H Christina Chambers MD   5 mg at 04/24/19 0939    benzocaine-menthol (CEPACOL SORE THROAT) lozenge 1 lozenge  1 lozenge Oral Q2H PRN Christina Chambers MD   1 lozenge at 04/23/19 1643    famotidine (PEPCID) injection 20 mg  20 mg Intravenous Daily Christina Chambers MD   20 mg at 04/24/19 1009    ondansetron (ZOFRAN) injection 4 mg  4 mg Intravenous Once Cheryle Huerta MD        sodium chloride flush 0.9 % injection 10 mL  10 mL Intravenous 2 times per day Christina Chambers MD        sodium chloride flush 0.9 % injection 10 mL  10 mL Intravenous PRN Christina Chambers MD        acetaminophen (TYLENOL) tablet 650 mg  650 mg Oral Q4H PRN Christina Chambers MD        ondansetron (ZOFRAN-ODT) disintegrating tablet 4 mg  4 mg Oral Q6H PRN Tl Lagunas MD        Or   [facility] ondansetron (ZOFRAN) injection 4 mg  4 mg Intravenous Q6H PRN Christina Chambers MD   4 mg at 04/21/19 1738    HYDROmorphone (DILAUDID) injection 0.5 mg  0.5 mg Intravenous Q4H PRN Christina Chambers MD   0.5 mg at 04/20/19 0810    phenol 1.4 % mouth spray 1 spray  1 spray Mouth/Throat Q2H PRN Haley Girard MD   1 spray at 04/22/19 0045    heparin (porcine) injection 5,000 Units  5,000 Units Subcutaneous BID Cassius Ascencio MD   5,000 Units at 04/24/19 9187     Allergies   Allergen Reactions    Codeine Other (See Comments)     Patient becomes very lethargic and BP drops     Compazine [Prochlorperazine] Other (See Comments)     Lock jaw     Morphine Other (See Comments)     Patient becomes very lethargic and BP drops     Moxifloxacin Rash     Principal Problem:    Ulcerative colitis ([facility]  Active Problems:    SBO (small bowel obstruction) ([facility])    Liver transplant recipient [facility])    Benign essential HTN    Sickle cell trait (HCC)    KAYLA (acute kidney injury) ([facility]    Stage 3 chronic kidney disease ([facility])    Elevated LFTs    Acute pharyngitis  Resolved Problems:    * No resolved hospital problems. *    Blood pressure 127/78, pulse 78, temperature 97.9 °F (36.6 °C), temperature source Oral, resp. rate 16, height 5' 7\" (1.702 m), weight 179 lb 6.4 oz (81.4 kg), SpO2 98 %. Subjective:  Symptoms:  Improved. She reports weakness and anorexia. No shortness of breath, malaise, cough, chest pain, headache, chest pressure, diarrhea or anxiety. Diet:  Poor intake. Activity level: Impaired due to weakness. Pain:  She reports no pain. Objective:  General Appearance:  Comfortable, ill-appearing, in no acute distress and not in pain. Vital signs: (most recent): Blood pressure 127/78, pulse 78, temperature 97.9 °F (36.6 °C), temperature source Oral, resp. rate 16, height 5' 7\" (1.702 m), weight 179 lb 6.4 oz (81.4 kg), SpO2 98 %. Output: Producing urine and no stool output.     HEENT: Normal HEENT exam. Lungs:  Normal effort and normal respiratory rate. Breath sounds clear to auscultation. She is not in respiratory distress. No stridor. There are decreased breath sounds. No rales. Heart: Normal rate. Regular rhythm. S1 normal and S2 normal.    Chest: No chest wall tenderness. Abdomen: Abdomen is soft and non-distended. Hypoactive bowel sounds. There is no right lower quadrant or left lower quadrant tenderness. There is no rebound tenderness. There is no guarding. There is no splenomegaly. There is no hepatomegaly. Extremities: Decreased range of motion. There is no local swelling. Neurological: Patient is alert and oriented to person, place and time. Pupils:  Pupils are equal, round, and reactive to light. Skin:  Warm and dry. Component Value Units   TACROLIMUS LEVEL [[id number]] (Abnormal) Collected: 04/24/19 0510   Updated: 04/24/19 1200    Specimen Source: Blood     Prograf 4.4Low  ng/mL    Comment: (NOTE)   The ABBOTT  Tacrolimus assay is a delayed one-step   immunoassay   for the quantitative determination of tacrolimus in human whole blood   using the chemiluminescent microparticle immunoassay (CMIA) technology   with flexible assay protocols, referred to as Chemiflex. The above 1 analytes were performed by [facility] [address] [address]       C-Reactive Protein [[id number]] (Abnormal) Collected: 04/24/19 0510   Updated: 04/24/19 1131    Specimen Source: Blood     CRP 17. 2High  mg/L   CBC Auto Differential [[id number]] (Abnormal) Collected: 04/24/19 0510   Updated: 04/24/19 1451    Specimen Source: Blood     WBC 3.0Low  k/uL    RBC 4.39 m/uL    Hemoglobin 10.2Low  g/dL    Hematocrit 32. 0Low  %    MCV 72.9Low  fL    MCH 23.2Low  pg    MCHC 31.8 g/dL    RDW 14.4 %    Platelets 882 k/uL    MPV NOT REPORTED fL    NRBC Automated NOT REPORTED per 100 WBC    Differential Type NOT REPORTED    Immature Granulocytes NOT REPORTED %    Absolute Immature Granulocyte NOT REPORTED k/uL    WBC Morphology NOT REPORTED    RBC Morphology NOT REPORTED    Platelet Estimate NOT REPORTED    Seg Neutrophils 50 %    Lymphocytes 41 %    Monocytes 8High  %    Eosinophils % 1 %    Basophils 0 %    Segs Absolute 1.60Low  k/uL    Absolute Lymph # 1.20 k/uL    Absolute Mono # 0.20 k/uL    Absolute Eos # 0.00 k/uL    Basophils # 0.00 k/uL   Basic Metabolic Panel [[id number]] (Abnormal) Collected: 04/24/19 0510   Updated: 04/24/19 0626    Specimen Source: Blood     Glucose 168High  mg/dL    BUN 11 mg/dL    CREATININE 0.79 mg/dL    Bun/Cre Ratio 14    Calcium 8.8 mg/dL    Sodium 136 mmol/L    Potassium 3.5Low  mmol/L    Chloride 102 mmol/L    CO2 20 mmol/L    Anion Gap 14 mmol/L    GFR Non-African American >60 mL/min    GFR African American >60 mL/min    GFR Comment         Comment: Average GFR for 79or more years old:    65 mL/min/1.73sq m   Chronic Kidney Disease:    <60 mL/min/1.73sq m   Kidney failure:    <15 mL/min/1.73sq m               eGFR calculated using average adult body mass. Additional eGFR calculator available at:         [employer]              GFR Staging NOT REPORTED   Magnesium [[id number]] (Abnormal) Collected: 04/24/19 0510   Updated: 04/24/19 0626     Magnesium 1.5Low  mg/dL   Phosphorus [[id number]] (Abnormal) Collected: 04/24/19 0510   Updated: 04/24/19 5131    Specimen Source: Blood     Phosphorus 2.3Low  mg/dL       Assessment:    Condition: In serious condition. Improving. (Patient Active Problem List:     SBO (small bowel obstruction) (HCC)     Ulcerative colitis ([facility] [address]     Liver transplant recipient [facility])     Benign essential HTN     Sickle cell trait (HCC)     KAYLA (acute kidney injury) ([facility] [address]     Stage 3 chronic kidney disease (HCC)     Elevated LFTs     Severe malnutrition (HCC)     Acute pharyngitis    ).      Plan:   (Pt much better today  Passing flatus  NG has been out  Will advance diet  KAYLA imprved  CKD3  Switch meds to PO  C/o burning K gtt  Will switch to PO  GI rec lower dose steroids  Will DC and switch to PO  Inflammation improved  DC plan once ok w Surg and GI).        Juan Manuel Chen MD  4/24/2019

## 2019-04-24 NOTE — PROGRESS NOTES
General Surgery Progress Note            PATIENT NAME: Guzman Wellington     TODAY'S DATE: 4/24/2019, 7:06 AM    SUBJECTIVE/OBJECTIVE:      VITALS:  BP (!) 141/79   Pulse 66   Temp 98.1 °F (36.7 °C) (Oral)   Resp 18   Ht 5' 7\" (1.702 m)   Wt 179 lb 6.4 oz (81.4 kg)   SpO2 98%   BMI 28.10 kg/m²      Patient seen and examined  No new issues  RRR  LCTAB  Abdomen soft, ND , NT. +BS. + flatus. No peripheral edema    INTAKE/OUTPUT:      Intake/Output Summary (Last 24 hours) at 4/24/2019 0706  Last data filed at 4/24/2019 0517  Gross per 24 hour   Intake 360 ml   Output 1300 ml   Net -940 ml       CBC with Differential:    Lab Results   Component Value Date    WBC 3.0 04/24/2019    RBC 4.39 04/24/2019    HGB 10.2 04/24/2019    HCT 32.0 04/24/2019     04/24/2019    MCV 72.9 04/24/2019    MCH 23.2 04/24/2019    MCHC 31.8 04/24/2019    RDW 14.4 04/24/2019    LYMPHOPCT 41 04/24/2019    MONOPCT 8 04/24/2019    BASOPCT 0 04/24/2019    MONOSABS 0.20 04/24/2019    LYMPHSABS 1.20 04/24/2019    EOSABS 0.00 04/24/2019    BASOSABS 0.00 04/24/2019    DIFFTYPE NOT REPORTED 04/24/2019     CMP:    Lab Results   Component Value Date     04/24/2019    K 3.5 04/24/2019     04/24/2019    CO2 20 04/24/2019    BUN 11 04/24/2019    CREATININE 0.79 04/24/2019    GFRAA >60 04/24/2019    LABGLOM >60 04/24/2019    GLUCOSE 168 04/24/2019    PROT 6.6 04/21/2019    LABALBU 2.9 04/21/2019    CALCIUM 8.8 04/24/2019    BILITOT 0.21 04/21/2019    ALKPHOS 123 04/21/2019    AST 10 04/21/2019    ALT 7 04/21/2019           ASSESSMENT/PLAN:    Principal Problem:    Ulcerative colitis ([address]  Active Problems:    SBO (small bowel obstruction) (HCC)    Liver transplant recipient [facility])    Benign essential HTN    Sickle cell trait (HCC)    KAYLA (acute kidney injury) ([facility] [address]    Stage 3 chronic kidney disease (HCC)    Elevated LFTs    Acute pharyngitis  Resolved Problems:    * No resolved hospital problems.  *    Ileus, resolving  Advance diet per GI  Consider enema/suppository to help with bowel function  Up as tolerated  Dr. Jair Lackey is providing coverage Thursday through Sunday

## 2019-04-25 VITALS
BODY MASS INDEX: 28.75 KG/M2 | WEIGHT: 183.2 LBS | SYSTOLIC BLOOD PRESSURE: 123 MMHG | TEMPERATURE: 97.9 F | HEART RATE: 87 BPM | HEIGHT: 67 IN | RESPIRATION RATE: 18 BRPM | DIASTOLIC BLOOD PRESSURE: 85 MMHG | OXYGEN SATURATION: 99 %

## 2019-04-25 LAB
ABSOLUTE EOS #: 0.17 K/UL (ref 0–0.44)
ABSOLUTE IMMATURE GRANULOCYTE: 0 K/UL (ref 0–0.3)
ABSOLUTE LYMPH #: 3.29 K/UL (ref 1.1–3.7)
ABSOLUTE MONO #: 0.38 K/UL (ref 0.1–1.2)
ANION GAP SERPL CALCULATED.3IONS-SCNC: 12 MMOL/L (ref 9–17)
BASOPHILS # BLD: 0 % (ref 0–2)
BASOPHILS ABSOLUTE: <0.03 K/UL (ref 0–0.2)
BUN BLDV-MCNC: 13 MG/DL (ref 8–23)
BUN/CREAT BLD: 13 (ref 9–20)
C-REACTIVE PROTEIN: 7.9 MG/L (ref 0–5)
CALCIUM SERPL-MCNC: 8.3 MG/DL (ref 8.6–10.4)
CAMPYLOBACTER PCR: NORMAL
CHLORIDE BLD-SCNC: 105 MMOL/L (ref 98–107)
CO2: 22 MMOL/L (ref 20–31)
CREAT SERPL-MCNC: 1.04 MG/DL (ref 0.5–0.9)
DATE, STOOL #1: ABNORMAL
DATE, STOOL #2: ABNORMAL
DATE, STOOL #3: ABNORMAL
DIFFERENTIAL TYPE: ABNORMAL
E COLI ENTEROTOXIGENIC PCR: NORMAL
EOSINOPHILS RELATIVE PERCENT: 3 % (ref 1–4)
GFR AFRICAN AMERICAN: >60 ML/MIN
GFR NON-AFRICAN AMERICAN: 52 ML/MIN
GFR SERPL CREATININE-BSD FRML MDRD: ABNORMAL ML/MIN/{1.73_M2}
GFR SERPL CREATININE-BSD FRML MDRD: ABNORMAL ML/MIN/{1.73_M2}
GLUCOSE BLD-MCNC: 115 MG/DL (ref 70–99)
HCT VFR BLD CALC: 30.2 % (ref 36.3–47.1)
HEMOCCULT SP1 STL QL: POSITIVE
HEMOCCULT SP2 STL QL: ABNORMAL
HEMOCCULT SP3 STL QL: ABNORMAL
HEMOGLOBIN: 9.2 G/DL (ref 11.9–15.1)
IMMATURE GRANULOCYTES: 0 %
LACTOFERRIN, QUAL: ABNORMAL
LYMPHOCYTES # BLD: 61 % (ref 24–43)
MAGNESIUM: 1.7 MG/DL (ref 1.6–2.6)
MCH RBC QN AUTO: 22 PG (ref 25.2–33.5)
MCHC RBC AUTO-ENTMCNC: 30.5 G/DL (ref 28.4–34.8)
MCV RBC AUTO: 72.1 FL (ref 82.6–102.9)
MONOCYTES # BLD: 7 % (ref 3–12)
NRBC AUTOMATED: 0 PER 100 WBC
PDW BLD-RTO: 15.2 % (ref 11.8–14.4)
PHOSPHORUS: 2.1 MG/DL (ref 2.6–4.5)
PLATELET # BLD: 213 K/UL (ref 138–453)
PLATELET ESTIMATE: ABNORMAL
PLESIOMONAS SHIGELLOIDES PCR: NORMAL
PMV BLD AUTO: 11.6 FL (ref 8.1–13.5)
POTASSIUM SERPL-SCNC: 3.1 MMOL/L (ref 3.7–5.3)
RBC # BLD: 4.19 M/UL (ref 3.95–5.11)
RBC # BLD: ABNORMAL 10*6/UL
SALMONELLA PCR: NORMAL
SEG NEUTROPHILS: 28 % (ref 36–65)
SEGMENTED NEUTROPHILS ABSOLUTE COUNT: 1.5 K/UL (ref 1.5–8.1)
SHIGATOXIN GENE PCR: NORMAL
SHIGELLA SP PCR: NORMAL
SODIUM BLD-SCNC: 139 MMOL/L (ref 135–144)
SPECIMEN DESCRIPTION: NORMAL
TIME, STOOL #1: 2130
TIME, STOOL #2: ABNORMAL
TIME, STOOL #3: ABNORMAL
VIBRIO PCR: NORMAL
WBC # BLD: 5.4 K/UL (ref 3.5–11.3)
WBC # BLD: ABNORMAL 10*3/UL
YERSINIA ENTEROCOLITICA PCR: NORMAL

## 2019-04-25 PROCEDURE — 83735 ASSAY OF MAGNESIUM: CPT

## 2019-04-25 PROCEDURE — 6370000000 HC RX 637 (ALT 250 FOR IP): Performed by: FAMILY MEDICINE

## 2019-04-25 PROCEDURE — 2580000003 HC RX 258: Performed by: FAMILY MEDICINE

## 2019-04-25 PROCEDURE — 6360000002 HC RX W HCPCS: Performed by: FAMILY MEDICINE

## 2019-04-25 PROCEDURE — 80048 BASIC METABOLIC PNL TOTAL CA: CPT

## 2019-04-25 PROCEDURE — APPNB30 APP NON BILLABLE TIME 0-30 MINS: Performed by: NURSE PRACTITIONER

## 2019-04-25 PROCEDURE — 84100 ASSAY OF PHOSPHORUS: CPT

## 2019-04-25 PROCEDURE — 36415 COLL VENOUS BLD VENIPUNCTURE: CPT

## 2019-04-25 PROCEDURE — 99232 SBSQ HOSP IP/OBS MODERATE 35: CPT | Performed by: INTERNAL MEDICINE

## 2019-04-25 PROCEDURE — 97110 THERAPEUTIC EXERCISES: CPT

## 2019-04-25 PROCEDURE — 86140 C-REACTIVE PROTEIN: CPT

## 2019-04-25 PROCEDURE — 2500000003 HC RX 250 WO HCPCS: Performed by: FAMILY MEDICINE

## 2019-04-25 PROCEDURE — 97116 GAIT TRAINING THERAPY: CPT

## 2019-04-25 PROCEDURE — 85025 COMPLETE CBC W/AUTO DIFF WBC: CPT

## 2019-04-25 PROCEDURE — 6360000002 HC RX W HCPCS: Performed by: INTERNAL MEDICINE

## 2019-04-25 PROCEDURE — 6370000000 HC RX 637 (ALT 250 FOR IP): Performed by: INTERNAL MEDICINE

## 2019-04-25 RX ORDER — BUDESONIDE 9 MG/1
1 TABLET, FILM COATED, EXTENDED RELEASE ORAL DAILY
Qty: 30 TABLET | Refills: 0 | Status: SHIPPED | OUTPATIENT
Start: 2019-04-25

## 2019-04-25 RX ORDER — POTASSIUM CHLORIDE 20 MEQ/1
20 TABLET, EXTENDED RELEASE ORAL ONCE
Status: COMPLETED | OUTPATIENT
Start: 2019-04-25 | End: 2019-04-25

## 2019-04-25 RX ORDER — POTASSIUM CHLORIDE 7.45 MG/ML
10 INJECTION INTRAVENOUS
Status: DISCONTINUED | OUTPATIENT
Start: 2019-04-25 | End: 2019-04-25

## 2019-04-25 RX ORDER — POTASSIUM CHLORIDE 20 MEQ/1
40 TABLET, EXTENDED RELEASE ORAL ONCE
Status: COMPLETED | OUTPATIENT
Start: 2019-04-25 | End: 2019-04-25

## 2019-04-25 RX ORDER — TACROLIMUS 1 MG/1
2 CAPSULE ORAL NIGHTLY
Qty: 60 CAPSULE | Refills: 3 | Status: SHIPPED | OUTPATIENT
Start: 2019-04-25 | End: 2019-04-25

## 2019-04-25 RX ORDER — TACROLIMUS 1 MG/1
2 CAPSULE ORAL 2 TIMES DAILY
Qty: 60 CAPSULE | Refills: 3 | Status: SHIPPED | OUTPATIENT
Start: 2019-04-25

## 2019-04-25 RX ORDER — TACROLIMUS 1 MG/1
1 CAPSULE ORAL EVERY MORNING
Qty: 60 CAPSULE | Refills: 3 | Status: SHIPPED | OUTPATIENT
Start: 2019-04-26 | End: 2019-04-25 | Stop reason: HOSPADM

## 2019-04-25 RX ADMIN — HEPARIN SODIUM 5000 UNITS: 5000 INJECTION, SOLUTION INTRAVENOUS; SUBCUTANEOUS at 08:02

## 2019-04-25 RX ADMIN — POTASSIUM CHLORIDE 40 MEQ: 20 TABLET, EXTENDED RELEASE ORAL at 09:01

## 2019-04-25 RX ADMIN — TACROLIMUS 1 MG: 1 CAPSULE ORAL at 08:02

## 2019-04-25 RX ADMIN — METOPROLOL TARTRATE 5 MG: 5 INJECTION INTRAVENOUS at 08:09

## 2019-04-25 RX ADMIN — METOPROLOL TARTRATE 5 MG: 5 INJECTION INTRAVENOUS at 03:21

## 2019-04-25 RX ADMIN — FAMOTIDINE 20 MG: 10 INJECTION, SOLUTION INTRAVENOUS at 08:02

## 2019-04-25 RX ADMIN — HYDROCORTISONE 40 MG: 5 TABLET ORAL at 08:02

## 2019-04-25 RX ADMIN — METOPROLOL TARTRATE 5 MG: 5 INJECTION INTRAVENOUS at 14:38

## 2019-04-25 RX ADMIN — CEFTRIAXONE SODIUM 1 G: 1 INJECTION, POWDER, FOR SOLUTION INTRAMUSCULAR; INTRAVENOUS at 13:48

## 2019-04-25 RX ADMIN — FERROUS SULFATE TAB EC 325 MG (65 MG FE EQUIVALENT) 325 MG: 325 (65 FE) TABLET DELAYED RESPONSE at 08:03

## 2019-04-25 RX ADMIN — POTASSIUM CHLORIDE 20 MEQ: 20 TABLET, EXTENDED RELEASE ORAL at 13:48

## 2019-04-25 RX ADMIN — POTASSIUM CHLORIDE 10 MEQ: 7.46 INJECTION, SOLUTION INTRAVENOUS at 08:01

## 2019-04-25 ASSESSMENT — PAIN SCALES - GENERAL: PAINLEVEL_OUTOF10: 0

## 2019-04-25 NOTE — PROGRESS NOTES
Nephrology Progress Note        Subjective: the patient is doing well, no SOB, tolerated regular diet yesterday,she has constipation,  BP is stable, afebrile, good UOP    Objective:  CURRENT TEMPERATURE:  Temp: 98.4 °F (36.9 °C)  MAXIMUM TEMPERATURE OVER 24HRS:  Temp (24hrs), Av.1 °F (36.7 °C), Min:97.9 °F (36.6 °C), Max:98.4 °F (36.9 °C)    CURRENT RESPIRATORY RATE:  Resp: 12  CURRENT PULSE:  Pulse: 73  CURRENT BLOOD PRESSURE:  BP: 125/67  24HR BLOOD PRESSURE RANGE:  Systolic (88KCX), XUU:662 , Min:114 , IWX:130   ; Diastolic (43QRL), DLF:51, Min:59, Max:85    24HR INTAKE/OUTPUT:    No intake or output data in the 24 hours ending 19 0743  Weight   Wt Readings from Last 3 Encounters:   19 183 lb 3.2 oz (83.1 kg)       Physical Exam:  Awake, alert, in no acute distress  Skin: warm and dry, no rash or erythema  Eyes: conjunctivae normal and sclera anicteric  Pulmonary: clear to auscultation bilaterally- no wheezes, rales or rhonchi, normal air movement, no respiratory distress  Cardiovascular: normal rate and normal S1 and S2  Abdomen: soft nontender, bowel sounds present, no organomegaly,  no ascites  Extremities: no cyanosis, clubbing or edema    Current Medications:      ferrous sulfate EC tablet 325 mg Daily with breakfast   tacrolimus (PROGRAF) capsule 1 mg QAM   ipratropium-albuterol (DUONEB) nebulizer solution 1 ampule Q4H WA   hydrocortisone (CORTEF) tablet 40 mg Daily   cefTRIAXone (ROCEPHIN) 1 g IVPB in 50 mL D5W minibag Q24H   tacrolimus (PROGRAF) capsule 2 mg Nightly   metoprolol (LOPRESSOR) injection 5 mg Q6H   benzocaine-menthol (CEPACOL SORE THROAT) lozenge 1 lozenge Q2H PRN   famotidine (PEPCID) injection 20 mg Daily   ondansetron (ZOFRAN) injection 4 mg Once   sodium chloride flush 0.9 % injection 10 mL 2 times per day   sodium chloride flush 0.9 % injection 10 mL PRN   acetaminophen (TYLENOL) tablet 650 mg Q4H PRN   ondansetron (ZOFRAN-ODT) disintegrating tablet 4 mg Q6H PRN   Or Exam: Atypical Epigastric pain this early morning Acuity: Acute Type of Exam: Initial FINDINGS: Lower Chest: Dependent lung changes. Organs: Evaluation of the solid organs is limited without intravenous contrast. Postsurgical changes within the upper abdomen from history of liver transplant. No liver lesion. Mild pneumobilia. Cholecystectomy. Pancreatic atrophy. No definite pancreatic lesion. No splenomegaly. No adrenal lesion. No hydronephrosis. Bilateral renal atrophy. No renal calculus. Multiple left renal cysts. GI/Bowel: Small bowel anastomosis. Mildly dilated loop of small bowel proximal to the anastomosis. No acute inflammatory process. Pelvis: No free fluid. No bladder calculus. Peritoneum/Retroperitoneum: Atherosclerotic calcification of the abdominal aorta without aneurysmal dilatation. No adenopathy. Bones/Soft Tissues: Postsurgical changes along the lower anterior abdominal wall. No acute abnormality. Diffuse osteopenia. Lumbar spine degenerative changes. Mildly dilated loop of small bowel proximal to the anastomosis. Findings may be on the basis of delayed transit versus mild partial small bowel obstruction. No high-grade obstruction. Xr Abdomen (kub) (single Ap View)    Result Date: 4/20/2019  EXAMINATION: SINGLE SUPINE XRAY VIEW(S) OF THE ABDOMEN 4/20/2019 7:32 am COMPARISON: April 19, 2019 HISTORY: ORDERING SYSTEM PROVIDED HISTORY: SBO TECHNOLOGIST PROVIDED HISTORY: SBO Acuity: Acute Type of Exam: Unknown FINDINGS: Enteric tube with the tip within the stomach. Moderate stool volume. No abnormally dilated loops of small bowel. No abnormally dilated loops of small bowel. Xr Abdomen For Ng/og/ne Tube Placement    Result Date: 4/19/2019  EXAMINATION: SINGLE SUPINE XRAY VIEW(S) OF THE ABDOMEN 4/19/2019 12:42 pm COMPARISON: None.  HISTORY: ORDERING SYSTEM PROVIDED HISTORY: Confirmation of course of NG/OG/NE tube and location of tip of tube TECHNOLOGIST PROVIDED HISTORY: Confirmation of course of NG/OG/NE tube and location of tip of tube Portable? ->Yes Ordering Physician Provided Reason for Exam: NG Placement Acuity: Acute Type of Exam: Unknown Additional signs and symptoms: NG Placement FINDINGS: The enteric tube tip and side hole terminating in the body of the stomach. Nondilated small and large bowel gas pattern. Mild stool burden in the distal colon. Surgical clips project in the mid abdomen. The lung bases reveal no acute findings. Enteric tube tip and side hole terminate in the body of the stomach. Assessment:  1. Acute kidney injury, likely prerenal, resolved  2. Hypokalemia   3. acidemia , BETTER  4. Hypopharyngeal edema   5. SBO  6. H/o UC  7. Anemia , NATALIE, On ferrous sulfate  8. HTN, controlled  9. Hypomagnesemia , replaced       Plan:  1. Replace potassium  2. Avoid nephrotoxins      Please do not hesitate to call with questions.     Electronically signed by Addi Peguero MD on 4/25/2019 at 7:43 AM

## 2019-04-25 NOTE — PLAN OF CARE
Problem: Nutrition  Goal: Optimal nutrition therapy  Description  Nutrition Problem: Severe malnutrition  Intervention: Food and/or Nutrient Delivery: Continue current diet, Continue current ONS  Nutritional Goals: Patient able to tolerate PO intake >75% of estimated kcal/protein needs, with good GI tolerance   Outcome: Ongoing

## 2019-04-25 NOTE — PROGRESS NOTES
Nutrition Assessment    Type and Reason for Visit: Reassess    Nutrition Recommendations: 1. Suggest continuing with low fiber diet. 2. Suggest continuing with clear liquid oral nutrition supplement (Ensure Clear) 2x/d. Nutrition Assessment: Patient slightly improved from a nutritional standpoint as evidenced by improved GI function (passing flatus, BM x1 4/25, active bowel sounds). Remains at risk for nutritional compromise related to 6% weight loss x2 weeks, mild subcutaneous fat loss, nausea, and poor PO intake (1-25% intake recorded in EHR). Upon visit with patient, she noted to writer that she is \"feeling better\" and wishes to continue to recieve clear liquid oral nutrition supplement 2x/d. Malnutrition Assessment:  · Malnutrition Status: Meets the criteria for severe malnutrition  · Context: Acute illness or injury  · Findings of the 6 clinical characteristics of malnutrition (Minimum of 2 out of 6 clinical characteristics is required to make the diagnosis of moderate or severe Protein Calorie Malnutrition based on AND/ASPEN Guidelines):  1. Energy Intake-Less than or equal to 50% of estimated energy requirement, Greater than or equal to 7 days    2. Weight Loss-5% loss or greater, (2 weeks)  3. Fat Loss-Mild subcutaneous fat loss, Orbital  4. Muscle Loss-Unable to assess  5. Fluid Accumulation-No significant fluid accumulation, Extremities  6.   Strength-Not measured    Nutrition Risk Level: High    Nutrient Needs:  · Estimated Daily Total Kcal: 6623-8156 (25-26 kcal/kg)   · Estimated Daily Protein (g): 80-90 (1.0-1.1 g/kg)   · Estimated Daily Total Fluid (ml/day): 4142-8534 (1ml/kcal)     Nutrition Diagnosis:   · Problem: Severe malnutrition  · Etiology: related to Difficulty swallowing, Alteration in GI function     Signs and symptoms:  as evidenced by Nausea, Mild loss of subcutaneous fat, Weight loss, Diet history of poor intake, Intake 0-25%    Objective Information:  · Nutrition-Focused Physical Findings: GI: soft, distended, nausea, last BM 4/25, non-tender, passing flatus, active bowel sounds; PV: WDL; Skin: WDL  · Wound Type: None  · Current Nutrition Therapies:  · Oral Diet Orders: Low Fiber   · Oral Diet intake: 1-25%  · Oral Nutrition Supplement (ONS) Orders: Clear Liquid Oral Supplement  · ONS intake: Unable to assess  · Anthropometric Measures:  · Ht: 5' 7\" (170.2 cm)   · Current Body Wt: 183 lb 3.2 oz (83.1 kg)  · Admission Body Wt: 183 lb (83 kg)(Stated)  · Usual Body Wt: 190 lb (86.2 kg)  · % Weight Change:  ,  6% loss x2 weeks   · Ideal Body Wt: 135 lb (61.2 kg), % Ideal Body 132%  · BMI Classification: BMI 25.0 - 29.9 Overweight    Nutrition Interventions:   Continue current diet, Continue current ONS  Continued Inpatient Monitoring, Coordination of Care    Nutrition Evaluation:   · Evaluation: Goals set   · Goals: Patient able to tolerate PO intake >75% of estimated kcal/protein needs, with good GI tolerance    · Monitoring: Nutrition Progression, Meal Intake, Supplement Intake, Diet Tolerance, Skin Integrity, I&O, Weight, Pertinent Labs, Chewing/Swallowing, Monitor Bowel Function, Nausea or Vomiting      Electronically signed by Hemanth Torres RD, LD on 4/25/19 at 3:29 PM    Contact Number: [phone]

## 2019-04-25 NOTE — PLAN OF CARE
Problem:  Bowel/Gastric:  Goal: Control of bowel function will improve  Description  Control of bowel function will improve  Outcome: Ongoing  Goal: Ability to achieve a regular elimination pattern will improve  Description  Ability to achieve a regular elimination pattern will improve  Outcome: Ongoing

## 2019-04-25 NOTE — PROGRESS NOTES
STUDIES  Recent Labs     04/23/19  0507   LABIRON 16*   TIBC 146*   FERRITIN 413*     FINDINGS:KUB 4/22/19   Nasogastric tube tip remains in the stomach.  Scattered gas within nondilated   bowel loops throughout the abdomen.  No gas-filled dilated small bowel loops   to suggest small bowel obstruction.  There are clips in the upper abdomen in   stable calcifications in the pelvis.  Bones are osteopenic with degenerative   changes lower lumbar spine.           Impression   Relatively stable exam with a nonspecific nonobstructive bowel gas pattern.          FINDINGS:ct abd 4/19/19   Lower Chest: Dependent lung changes.       Organs: Evaluation of the solid organs is limited without intravenous   contrast. Postsurgical changes within the upper abdomen from history of liver   transplant.  No liver lesion.  Mild pneumobilia.  Cholecystectomy.    Pancreatic atrophy.  No definite pancreatic lesion.  No splenomegaly.  No   adrenal lesion.  No hydronephrosis.  Bilateral renal atrophy.  No renal   calculus.  Multiple left renal cysts.       GI/Bowel: Small bowel anastomosis.  Mildly dilated loop of small bowel   proximal to the anastomosis.  No acute inflammatory process.       Pelvis: No free fluid.  No bladder calculus.       Peritoneum/Retroperitoneum: Atherosclerotic calcification of the abdominal   aorta without aneurysmal dilatation.  No adenopathy.       Bones/Soft Tissues: Postsurgical changes along the lower anterior abdominal   wall.  No acute abnormality.  Diffuse osteopenia.  Lumbar spine degenerative   changes.           Impression   Mildly dilated loop of small bowel proximal to the anastomosis.  Findings may   be on the basis of delayed transit versus mild partial small bowel   obstruction.  No high-grade obstruction.      Xr Abdomen (kub) (single Ap View)     Result Date: 4/20/2019  EXAMINATION: SINGLE SUPINE XRAY VIEW(S) OF THE ABDOMEN 4/20/2019 7:32 am COMPARISON: April 19, 2019 HISTORY: ORDERING SYSTEM PROVIDED HISTORY: SBO TECHNOLOGIST PROVIDED HISTORY: SBO Acuity: Acute Type of Exam: Unknown FINDINGS: Enteric tube with the tip within the stomach. Moderate stool volume.  No abnormally dilated loops of small bowel.      No abnormally dilated loops of small bowel.            ASSESSMENT/PLAN:  1. Abdominal pain with SBO with hx UC improved  -stop the prednisone  -Dr Matilde Lozano recommends starting Uceris 9mg orally daily for thirty days for her UC and she should follow up in the office with him in 2 weeks  -no need for any antibiotics from a GI standpoint.  -low fiber diet    GI signing off, ok to send home from a GI standpoint when ok with other services      This plan was formulated in collaboration with Dr. Matilde Lozano .     Electronically signed by: Sammuel Peabody, APRN - CNP on 4/25/2019 at 1:40 PM

## 2019-04-25 NOTE — PROGRESS NOTES
Physical Therapy  Facility/Department: [facility] MED SURG  Daily Treatment Note  NAME: Yaya Pastor  : 1950  MRN: [id number]    Date of Service: 2019    Discharge Recommendations:  Home with assist PRN, Outpatient PT   PT Equipment Recommendations  Equipment Needed: No    Patient Diagnosis(es): The primary encounter diagnosis was SBO (small bowel obstruction) ([facility] A diagnosis of Generalized abdominal pain was also pertinent to this visit. has a past medical history of Liver transplant recipient [facility]), Sickle cell trait ([facility] Tachycardia, and Ulcerative colitis ([facility] has a past surgical history that includes Liver transplant (4529,0517); hernia repair; Cholecystectomy (); and Total knee arthroplasty (Left, ). Restrictions  Restrictions/Precautions  Restrictions/Precautions: General Precautions  Required Braces or Orthoses?: No  Position Activity Restriction  Other position/activity restrictions:  Up with assist, IV  Subjective   General  Chart Reviewed: Yes  Family / Caregiver Present: No  General Comment  Comments: Pt willing to get up and ambulate  Pain Screening  Patient Currently in Pain: Denies  Vital Signs  Patient Currently in Pain: Denies       Orientation  Orientation  Overall Orientation Status: Within Functional Limits  Cognition   Cognition  Overall Cognitive Status: WFL  Objective   Bed mobility  Rolling to Left: Independent  Rolling to Right: Independent  Supine to Sit: Independent  Scooting: Independent  Transfers  Sit to Stand: Stand by assistance  Stand to sit: Stand by assistance  Stand Pivot Transfers: Stand by assistance  Ambulation  Ambulation?: Yes  More Ambulation?: Yes  Ambulation 1  Surface: level tile  Device: No Device  Assistance: Stand by assistance  Quality of Gait: slow gait, mildly antalgic  Distance: 200 ft  Stairs/Curb  Stairs?: Yes  Stairs  # Steps : 6  Stairs Height: 6\"  Rails: Left ascending  Device: No Device  Assistance: Stand by assistance  Comment:

## 2019-04-25 NOTE — PROGRESS NOTES
Terri Montes is a 77 y.o. female patient.     Current Facility-Administered Medications   Medication Dose Route Frequency Provider Last Rate Last Dose    potassium chloride 10 mEq/100 mL IVPB (Peripheral Line)  10 mEq Intravenous Q1H Brooke Ahn  mL/hr at 04/25/19 0801 10 mEq at 04/25/19 0801    ferrous sulfate EC tablet 325 mg  325 mg Oral Daily with breakfast Brooke Ahn MD   325 mg at 04/25/19 0803    tacrolimus (PROGRAF) capsule 1 mg  1 mg Oral QAM Christina Chambers MD   1 mg at 04/25/19 0802    ipratropium-albuterol (DUONEB) nebulizer solution 1 ampule  1 ampule Inhalation Q4H WA Christina Chambers MD        hydrocortisone (CORTEF) tablet 40 mg  40 mg Oral Daily Christina Chambers MD   40 mg at 04/25/19 0802    cefTRIAXone (ROCEPHIN) 1 g IVPB in 50 mL D5W minibag  1 g Intravenous Q24H Lori Kaufman MD   Stopped at 04/24/19 1601    tacrolimus (PROGRAF) capsule 2 mg  2 mg Oral Nightly Christina Chambers MD   2 mg at 04/24/19 2125    metoprolol (LOPRESSOR) injection 5 mg  5 mg Intravenous Q6H Christina Chambers MD   5 mg at 04/25/19 0321    benzocaine-menthol (CEPACOL SORE THROAT) lozenge 1 lozenge  1 lozenge Oral Q2H PRN Christina Chambers MD   1 lozenge at 04/23/19 1643    famotidine (PEPCID) injection 20 mg  20 mg Intravenous Daily Christina Chambers MD   20 mg at 04/25/19 0802    ondansetron (ZOFRAN) injection 4 mg  4 mg Intravenous Once Dolores Hannah MD        sodium chloride flush 0.9 % injection 10 mL  10 mL Intravenous 2 times per day Lori Kaufman MD   10 mL at 04/24/19 2102    sodium chloride flush 0.9 % injection 10 mL  10 mL Intravenous PRN Christina Chambers MD        acetaminophen (TYLENOL) tablet 650 mg  650 mg Oral Q4H PRN Christina Chambers MD        ondansetron (ZOFRAN-ODT) disintegrating tablet 4 mg  4 mg Oral Q6H PRN Christina Chambers MD        Or    ondansetron (ZOFRAN) injection 4 mg  4 mg Intravenous Q6H PRN Christina Chambers MD   4 mg at 04/24/19 2102    HYDROmorphone (DILAUDID) injection 0.5 mg  0.5 mg Intravenous Q4H PRN Christina Chambers MD   0.5 mg at 04/20/19 0810    phenol 1.4 % mouth spray 1 spray  1 spray Mouth/Throat Q2H PRN Bird Roach MD   1 spray at 04/22/19 0045    heparin (porcine) injection 5,000 Units  5,000 Units Subcutaneous BID Lori Kaufman MD   5,000 Units at 04/25/19 0802     Allergies   Allergen Reactions    Codeine Other (See Comments)     Patient becomes very lethargic and BP drops     Compazine [Prochlorperazine] Other (See Comments)     Lock jaw     Morphine Other (See Comments)     Patient becomes very lethargic and BP drops     Moxifloxacin Rash     Principal Problem:    Ulcerative colitis ([facility]  Active Problems:    SBO (small bowel obstruction) (HCC)    Liver transplant recipient [facility])    Benign essential HTN    Sickle cell trait (HCC)    KAYLA (acute kidney injury) ([facility]    Stage 3 chronic kidney disease ([facility])    Elevated LFTs    Acute pharyngitis  Resolved Problems:    * No resolved hospital problems. *    Blood pressure 125/67, pulse 73, temperature 98.4 °F (36.9 °C), temperature source Oral, resp. rate 12, height 5' 7\" (1.702 m), weight 183 lb 3.2 oz (83.1 kg), SpO2 95 %. Subjective:  Symptoms:  Improved. No shortness of breath, malaise, cough, chest pain, weakness, headache, chest pressure, anorexia, diarrhea or anxiety. Diet:  Adequate intake. Activity level: Returning to normal.    Pain:  She reports no pain. Objective:  General Appearance:  Comfortable, in no acute distress, not in pain and well-appearing. Vital signs: (most recent): Blood pressure 125/67, pulse 73, temperature 98.4 °F (36.9 °C), temperature source Oral, resp. rate 12, height 5' 7\" (1.702 m), weight 183 lb 3.2 oz (83.1 kg), SpO2 95 %. Output: Producing urine and producing stool. HEENT: Normal HEENT exam.    Lungs:  Normal effort and normal respiratory rate. Breath sounds clear to auscultation. She is not in respiratory distress. No stridor.   There are decreased MCH 22.0Low  pg    MCHC 30.5 g/dL    RDW 15.2High  %    Platelets 860 k/uL    MPV 11.6 fL       Assessment:    Condition: In stable condition. Improving. (Patient Active Problem List:     SBO (small bowel obstruction) (HCC)     Ulcerative colitis ([facility]     Liver transplant recipient [facility])     Benign essential HTN     Sickle cell trait (HCC)     [facility]LA ([facility]) ([facility]     Stage 3 chronic kidney disease (HCC)     Elevated LFTs     Severe malnutrition (HCC)     Acute pharyngitis    ). Plan:   (Pt much better  Small BM also  No bleeding  GI input and rec noted  communicated w pt and RN  meds called in per GI  Not available at [address] good  Eating  Sore throat resolved  Lab levels/ Tac reviewed also  Renal function stbale  Plan DC if OK w all).        Lam Cifuentes MD  4/25/2019

## 2019-04-25 NOTE — PLAN OF CARE
Problem: Falls - Risk of:  Goal: Will remain free from falls  Description  Will remain free from falls  Outcome: Met This Shift  Note:   Patient ambulatory in room with steady gait  Patient independent  Low fall risk     Problem: Pain:  Goal: Pain level will decrease  Description  Pain level will decrease  Outcome: Met This Shift     Problem:  Bowel/Gastric:  Goal: Control of bowel function will improve  Description  Control of bowel function will improve  4/25/2019 1556 by Patti Haney RN  Outcome: Met This Shift

## 2019-04-26 NOTE — PROGRESS NOTES
Dr Adrian Ely stated OK to discharge after clarifying new presription (Uceris PO) with Dr Amrik Tuttle. RN contacted Dr Amrik Tuttle regarding new Uceris prescription via telephone. Dr Amrik Tuttle stated pt should continue taking previous medications as prescribed, taking uceris as prescribed in addition. Pt education regarding dosage and frequency of Uceris provided to patient prior to discharge. Pt will follow up with Dr Adrian Ely as needed or with questions regarding prescription.

## 2019-04-26 NOTE — DISCHARGE SUMMARY
Pt discharged to home with spouse in stable condition. Pt education and medication information provided prior to discharge. Pt instructed to contact Dr Keshia Alba as needed for refill information regarding Uceris prescription. Pt will  Uceris prescription at preferred pharmacy ([facility] on  [facility] [facility]). Pt belongings collected prior to discharge. All questions and concerns answered.

## 2019-04-28 ASSESSMENT — ENCOUNTER SYMPTOMS
DIARRHEA: 0
SHORTNESS OF BREATH: 0
COUGH: 0

## 2019-04-28 NOTE — DISCHARGE SUMMARY
Patient ID: Tiffanie Sams    MRN: [id number]     Acct:  [de-identified]       Patient's PCP: Popeye Alexander MD    Admit Date: 4/19/2019     Discharge Date: 4/25/2019      Admitting Physician: Candis Dominique MD    Discharge Physician: Candis Dominique MD     Discharge Diagnoses: Ac exacerbation Ulcerative Colitis    Primary Problem  Ulcerative colitis [facility])    Active Hospital Problems    Diagnosis Date Noted    Acute pharyngitis [J02.9] 04/23/2019    Ulcerative colitis ([facility] [K51.90] 04/20/2019    Liver transplant recipient [facility]) [Z94.4] 04/20/2019    Benign essential HTN [I10] 04/20/2019    Sickle cell trait ([facility] [D57.3] 04/20/2019    KAYLA (acute kidney injury) ([facility] [N17.9] 04/20/2019    Stage 3 chronic kidney disease ([facility] [N18.3] 04/20/2019    Elevated LFTs [R94.5] 04/20/2019    SBO (small bowel obstruction) ([facility] [K56.609] 04/19/2019     Past Medical History:   Diagnosis Date    Liver transplant recipient [facility]) 2004 and 2010    x2     Sickle cell trait ([facility]     Tachycardia     Ulcerative colitis ([facility]      The patient was seen and examined on day of discharge and this discharge summary is in conjunction with any daily progress note from day of discharge. Code Status:  Prior    Hospital Course: Pt admitted w Ac SBO. Found to have acute Ileus with acute exacerbation of UC. NG placed. Kept NPO and on IV fluids. Started on IV steroids. Pt is s/p Liver transplant. [facility] transplant dept contacted and Tacrolimus switched to SL. Pt developed ac pharyngitis and started on IV ABx. Pt slowly progressed and Ileus resolved. Seroids reduced and meds restarted PO.  Care coordinated w Dr. Doreen Forbes office    Consults:  GI and ENT and Pharmacy    Significant Diagnostic Studies: as above, and as follows: see emr    Treatments: as above    Disposition: home    Discharged Condition: Stable    Follow Up:  Popeye Alexander MD in one week    Discharge Medications:    Tad Asher Medication Instructions

## 2019-07-09 ENCOUNTER — HOSPITAL ENCOUNTER (OUTPATIENT)
Dept: ULTRASOUND IMAGING | Facility: CLINIC | Age: 69
Discharge: HOME | End: 2019-07-11
Payer: MEDICARE

## 2019-07-09 DIAGNOSIS — M25.562 LEFT KNEE PAIN, UNSPECIFIED CHRONICITY: ICD-10-CM

## 2019-07-09 PROCEDURE — 76881 US COMPL JOINT R-T W/IMG: CPT

## 2019-08-01 ENCOUNTER — ANESTHESIA (OUTPATIENT)
Dept: ENDOSCOPY | Age: 69
End: 2019-08-01
Payer: MEDICARE

## 2019-08-01 ENCOUNTER — HOSPITAL ENCOUNTER (OUTPATIENT)
Age: 69
Setting detail: OUTPATIENT SURGERY
Discharge: HOME | End: 2019-08-01
Attending: INTERNAL MEDICINE | Admitting: INTERNAL MEDICINE
Payer: MEDICARE

## 2019-08-01 ENCOUNTER — ANESTHESIA EVENT (OUTPATIENT)
Dept: ENDOSCOPY | Age: 69
End: 2019-08-01
Payer: MEDICARE

## 2019-08-01 VITALS
TEMPERATURE: 95.3 F | SYSTOLIC BLOOD PRESSURE: 139 MMHG | DIASTOLIC BLOOD PRESSURE: 72 MMHG | OXYGEN SATURATION: 100 % | HEART RATE: 63 BPM | RESPIRATION RATE: 17 BRPM

## 2019-08-01 VITALS
RESPIRATION RATE: 20 BRPM | OXYGEN SATURATION: 100 % | SYSTOLIC BLOOD PRESSURE: 150 MMHG | DIASTOLIC BLOOD PRESSURE: 88 MMHG

## 2019-08-01 PROCEDURE — 88341 IMHCHEM/IMCYTCHM EA ADD ANTB: CPT

## 2019-08-01 PROCEDURE — 2709999900 HC NON-CHARGEABLE SUPPLY: Performed by: INTERNAL MEDICINE

## 2019-08-01 PROCEDURE — 7100000011 HC PHASE II RECOVERY - ADDTL 15 MIN: Performed by: INTERNAL MEDICINE

## 2019-08-01 PROCEDURE — 88342 IMHCHEM/IMCYTCHM 1ST ANTB: CPT

## 2019-08-01 PROCEDURE — 7100000010 HC PHASE II RECOVERY - FIRST 15 MIN: Performed by: INTERNAL MEDICINE

## 2019-08-01 PROCEDURE — 3609009500 HC COLONOSCOPY DIAGNOSTIC OR SCREENING: Performed by: INTERNAL MEDICINE

## 2019-08-01 PROCEDURE — 6360000002 HC RX W HCPCS: Performed by: NURSE ANESTHETIST, CERTIFIED REGISTERED

## 2019-08-01 PROCEDURE — 36415 COLL VENOUS BLD VENIPUNCTURE: CPT

## 2019-08-01 PROCEDURE — 2580000003 HC RX 258: Performed by: INTERNAL MEDICINE

## 2019-08-01 PROCEDURE — 88305 TISSUE EXAM BY PATHOLOGIST: CPT

## 2019-08-01 PROCEDURE — 2500000003 HC RX 250 WO HCPCS: Performed by: NURSE ANESTHETIST, CERTIFIED REGISTERED

## 2019-08-01 PROCEDURE — 3700000000 HC ANESTHESIA ATTENDED CARE: Performed by: INTERNAL MEDICINE

## 2019-08-01 PROCEDURE — 3700000001 HC ADD 15 MINUTES (ANESTHESIA): Performed by: INTERNAL MEDICINE

## 2019-08-01 RX ORDER — PROPOFOL 10 MG/ML
INJECTION, EMULSION INTRAVENOUS PRN
Status: DISCONTINUED | OUTPATIENT
Start: 2019-08-01 | End: 2019-08-01 | Stop reason: SDUPTHER

## 2019-08-01 RX ORDER — LIDOCAINE HYDROCHLORIDE 10 MG/ML
INJECTION, SOLUTION INFILTRATION; PERINEURAL PRN
Status: DISCONTINUED | OUTPATIENT
Start: 2019-08-01 | End: 2019-08-01 | Stop reason: SDUPTHER

## 2019-08-01 RX ORDER — FENTANYL CITRATE 50 UG/ML
25 INJECTION, SOLUTION INTRAMUSCULAR; INTRAVENOUS EVERY 5 MIN PRN
Status: CANCELLED | OUTPATIENT
Start: 2019-08-01

## 2019-08-01 RX ORDER — MIDAZOLAM HYDROCHLORIDE 1 MG/ML
1 INJECTION INTRAMUSCULAR; INTRAVENOUS EVERY 10 MIN PRN
Status: CANCELLED | OUTPATIENT
Start: 2019-08-01

## 2019-08-01 RX ORDER — SODIUM CHLORIDE, SODIUM LACTATE, POTASSIUM CHLORIDE, CALCIUM CHLORIDE 600; 310; 30; 20 MG/100ML; MG/100ML; MG/100ML; MG/100ML
INJECTION, SOLUTION INTRAVENOUS CONTINUOUS
Status: CANCELLED | OUTPATIENT
Start: 2019-08-01

## 2019-08-01 RX ORDER — SODIUM CHLORIDE 0.9 % (FLUSH) 0.9 %
10 SYRINGE (ML) INJECTION PRN
Status: CANCELLED | OUTPATIENT
Start: 2019-08-01

## 2019-08-01 RX ORDER — SODIUM CHLORIDE 0.9 % (FLUSH) 0.9 %
10 SYRINGE (ML) INJECTION EVERY 12 HOURS SCHEDULED
Status: CANCELLED | OUTPATIENT
Start: 2019-08-01

## 2019-08-01 RX ORDER — LIDOCAINE HYDROCHLORIDE 10 MG/ML
1 INJECTION, SOLUTION EPIDURAL; INFILTRATION; INTRACAUDAL; PERINEURAL
Status: CANCELLED | OUTPATIENT
Start: 2019-08-01 | End: 2019-08-01

## 2019-08-01 RX ORDER — SODIUM CHLORIDE 9 MG/ML
INJECTION, SOLUTION INTRAVENOUS CONTINUOUS
Status: DISCONTINUED | OUTPATIENT
Start: 2019-08-01 | End: 2019-08-01 | Stop reason: HOSPADM

## 2019-08-01 RX ADMIN — SODIUM CHLORIDE: 9 INJECTION, SOLUTION INTRAVENOUS at 09:21

## 2019-08-01 RX ADMIN — LIDOCAINE HYDROCHLORIDE 50 MG: 10 INJECTION, SOLUTION INFILTRATION; PERINEURAL at 11:15

## 2019-08-01 RX ADMIN — LIDOCAINE HYDROCHLORIDE 25 MG: 10 INJECTION, SOLUTION INFILTRATION; PERINEURAL at 11:05

## 2019-08-01 RX ADMIN — PROPOFOL 210 MG: 10 INJECTION, EMULSION INTRAVENOUS at 11:20

## 2019-08-01 RX ADMIN — PROPOFOL 200 MG: 10 INJECTION, EMULSION INTRAVENOUS at 11:07

## 2019-08-01 RX ADMIN — SODIUM CHLORIDE: 9 INJECTION, SOLUTION INTRAVENOUS at 11:02

## 2019-08-01 ASSESSMENT — PAIN SCALES - GENERAL
PAINLEVEL_OUTOF10: 0
PAINLEVEL_OUTOF10: 0

## 2019-08-01 NOTE — ANESTHESIA POSTPROCEDURE EVALUATION
Department of Anesthesiology  Postprocedure Note    Patient: Crispin Lovett  MRN: [id number]  YOB: 1950  Date of evaluation: 8/1/2019  Time:  11:59 AM     Procedure Summary     Date:  08/01/19 Room / Location:  [facility] 04 / [facility] Endoscopy    Anesthesia Start:  1102 Anesthesia Stop:  2534    Procedure:  COLONOSCOPY DIAGNOSTIC (N/A ) Diagnosis:  (COLITIS)    Surgeon:  Carmine Gowers, MD Responsible Provider:  Arnold Lock MD    Anesthesia Type:  MAC ASA Status:  3          Anesthesia Type: MAC    Adeola Phase I: Adeola Score: 10    Adeola Phase II: Adeola Score: 9    Last vitals: Reviewed and per EMR flowsheets.        Anesthesia Post Evaluation    Patient location during evaluation: PACU  Patient participation: complete - patient participated  Level of consciousness: awake and alert  Pain score: 0  Airway patency: patent  Nausea & Vomiting: no vomiting and no nausea  Complications: no  Cardiovascular status: hemodynamically stable  Respiratory status: acceptable  Hydration status: stable

## 2019-08-01 NOTE — ANESTHESIA PRE PROCEDURE
Department of Anesthesiology  Preprocedure Note       Name:  Calin Polo   Age:  77 y.o.  :  1950                                          MRN:  [id number]         Date:  2019      Surgeon: Brittani Culp):  Jose Armando Stevens MD    Procedure: COLONOSCOPY DIAGNOSTIC (N/A )    Medications prior to admission:   Prior to Admission medications    Medication Sig Start Date End Date Taking? Authorizing Provider   tacrolimus (PROGRAF) 1 MG capsule Take 2 capsules by mouth 2 times daily Pls get Tacrolimus level checked in 7 days and review results w Transplant center 19  Yes Amari Lai MD   Budesonide ER (UCERIS) 9 MG TB24 Take 1 Dose by mouth daily 19  Yes Amari Lai MD   mesalamine (DELZICOL) 800 MG TBEC TBEC tablet Take 1,600 mg by mouth 3 times daily    Yes Historical Provider, MD   atorvastatin (LIPITOR) 20 MG tablet Take 20 mg by mouth daily   Yes Historical Provider, MD   Cyanocobalamin (VITAMIN B 12 PO) Take 1 tablet by mouth daily   Yes Historical Provider, MD   ursodiol (ACTIGALL) 300 MG capsule Take 600 mg by mouth 2 times daily   Yes Historical Provider, MD   metoprolol succinate (TOPROL XL) 25 MG extended release tablet Take 25 mg by mouth nightly  18  Yes Historical Provider, MD   conjugated estrogens (PREMARIN) 0.625 MG/GM vaginal cream Place 0.5 g vaginally as needed    Yes Historical Provider, MD   Cholecalciferol-Vitamin C (VITAMIN D3-VITAMIN C) 1000-500 UNIT-MG CAPS Take 1 tablet by mouth 2 times daily   Yes Historical Provider, MD   Multiple Vitamins-Iron (ONE DAILY MULTIVITAMIN/IRON PO) Take 1 tablet by mouth daily   Yes Historical Provider, MD       Current medications:    No current facility-administered medications for this encounter. Allergies:     Allergies   Allergen Reactions    Prochlorperazine Edisylate Other (See Comments) and Hives     Lock jaw  unspecified      Codeine Other (See Comments)     Patient becomes very lethargic and BP drops    

## 2019-08-06 LAB — SURGICAL PATHOLOGY REPORT: NORMAL

## 2020-12-13 ENCOUNTER — HOSPITAL ENCOUNTER (OUTPATIENT)
Dept: LAB | Age: 70
Setting detail: SPECIMEN
Discharge: HOME | End: 2020-12-13
Payer: COMMERCIAL

## 2020-12-13 PROCEDURE — U0003 INFECTIOUS AGENT DETECTION BY NUCLEIC ACID (DNA OR RNA); SEVERE ACUTE RESPIRATORY SYNDROME CORONAVIRUS 2 (SARS-COV-2) (CORONAVIRUS DISEASE [COVID-19]), AMPLIFIED PROBE TECHNIQUE, MAKING USE OF HIGH THROUGHPUT TECHNOLOGIES AS DESCRIBED BY CMS-2020-01-R: HCPCS

## 2020-12-14 LAB
SARS-COV-2, RAPID: NORMAL
SARS-COV-2: NORMAL
SARS-COV-2: NOT DETECTED
SOURCE: NORMAL

## 2020-12-16 ENCOUNTER — TELEPHONE (OUTPATIENT)
Dept: FAMILY MEDICINE CLINIC | Age: 70
End: 2020-12-16

## 2020-12-16 ENCOUNTER — ANESTHESIA EVENT (OUTPATIENT)
Dept: ENDOSCOPY | Age: 70
End: 2020-12-16
Payer: MEDICARE

## 2020-12-17 ENCOUNTER — ANESTHESIA (OUTPATIENT)
Dept: ENDOSCOPY | Age: 70
End: 2020-12-17
Payer: MEDICARE

## 2020-12-17 ENCOUNTER — HOSPITAL ENCOUNTER (OUTPATIENT)
Age: 70
Setting detail: OUTPATIENT SURGERY
Discharge: HOME | End: 2020-12-17
Attending: INTERNAL MEDICINE | Admitting: INTERNAL MEDICINE
Payer: MEDICARE

## 2020-12-17 VITALS
WEIGHT: 202 LBS | HEART RATE: 68 BPM | BODY MASS INDEX: 31.71 KG/M2 | DIASTOLIC BLOOD PRESSURE: 82 MMHG | OXYGEN SATURATION: 98 % | TEMPERATURE: 97.7 F | HEIGHT: 67 IN | SYSTOLIC BLOOD PRESSURE: 124 MMHG | RESPIRATION RATE: 16 BRPM

## 2020-12-17 VITALS — DIASTOLIC BLOOD PRESSURE: 56 MMHG | TEMPERATURE: 97.7 F | SYSTOLIC BLOOD PRESSURE: 98 MMHG | RESPIRATION RATE: 22 BRPM

## 2020-12-17 PROCEDURE — 7100000011 HC PHASE II RECOVERY - ADDTL 15 MIN: Performed by: INTERNAL MEDICINE

## 2020-12-17 PROCEDURE — 76937 US GUIDE VASCULAR ACCESS: CPT

## 2020-12-17 PROCEDURE — 3609027000 HC COLONOSCOPY: Performed by: INTERNAL MEDICINE

## 2020-12-17 PROCEDURE — 2500000003 HC RX 250 WO HCPCS: Performed by: NURSE ANESTHETIST, CERTIFIED REGISTERED

## 2020-12-17 PROCEDURE — 2580000003 HC RX 258: Performed by: NURSE ANESTHETIST, CERTIFIED REGISTERED

## 2020-12-17 PROCEDURE — 6360000002 HC RX W HCPCS: Performed by: NURSE ANESTHETIST, CERTIFIED REGISTERED

## 2020-12-17 PROCEDURE — 2709999900 HC NON-CHARGEABLE SUPPLY: Performed by: INTERNAL MEDICINE

## 2020-12-17 PROCEDURE — 3700000000 HC ANESTHESIA ATTENDED CARE: Performed by: INTERNAL MEDICINE

## 2020-12-17 PROCEDURE — 2580000003 HC RX 258: Performed by: INTERNAL MEDICINE

## 2020-12-17 PROCEDURE — 7100000010 HC PHASE II RECOVERY - FIRST 15 MIN: Performed by: INTERNAL MEDICINE

## 2020-12-17 RX ORDER — PROPOFOL 10 MG/ML
INJECTION, EMULSION INTRAVENOUS PRN
Status: DISCONTINUED | OUTPATIENT
Start: 2020-12-17 | End: 2020-12-17 | Stop reason: SDUPTHER

## 2020-12-17 RX ORDER — SODIUM CHLORIDE 9 MG/ML
INJECTION, SOLUTION INTRAVENOUS CONTINUOUS PRN
Status: DISCONTINUED | OUTPATIENT
Start: 2020-12-17 | End: 2020-12-17 | Stop reason: SDUPTHER

## 2020-12-17 RX ORDER — LIDOCAINE HYDROCHLORIDE 10 MG/ML
INJECTION, SOLUTION EPIDURAL; INFILTRATION; INTRACAUDAL; PERINEURAL PRN
Status: DISCONTINUED | OUTPATIENT
Start: 2020-12-17 | End: 2020-12-17 | Stop reason: SDUPTHER

## 2020-12-17 RX ORDER — SODIUM CHLORIDE 9 MG/ML
INJECTION, SOLUTION INTRAVENOUS ONCE
Status: COMPLETED | OUTPATIENT
Start: 2020-12-17 | End: 2020-12-17

## 2020-12-17 RX ADMIN — PROPOFOL 80 MG: 10 INJECTION, EMULSION INTRAVENOUS at 09:53

## 2020-12-17 RX ADMIN — SODIUM CHLORIDE: 9 INJECTION, SOLUTION INTRAVENOUS at 07:56

## 2020-12-17 RX ADMIN — LIDOCAINE HYDROCHLORIDE 50 MG: 10 INJECTION, SOLUTION EPIDURAL; INFILTRATION; INTRACAUDAL; PERINEURAL at 09:48

## 2020-12-17 RX ADMIN — SODIUM CHLORIDE: 0.9 INJECTION, SOLUTION INTRAVENOUS at 09:42

## 2020-12-17 RX ADMIN — PROPOFOL 120 MG: 10 INJECTION, EMULSION INTRAVENOUS at 09:48

## 2020-12-17 ASSESSMENT — PAIN SCALES - GENERAL
PAINLEVEL_OUTOF10: 0

## 2020-12-17 ASSESSMENT — LIFESTYLE VARIABLES: SMOKING_STATUS: 0

## 2020-12-17 NOTE — H&P
History and Physical    Pt Name: Ernie Gottron  MRN: [id number]  YOB: 1950  Date of evaluation: 12/17/2020    SUBJECTIVE:   History of Chief Complaint:    Patient presents for colonoscopy. She has had colitis in the past, as well as small bowel obstruction. She has a history of primary sclerosing cholangitis, s/p liver transplant x 2. Patient says that she is to have colonoscopies yearly due to her history of liver transplant. Past Medical History    has a past medical history of Arthritis, History of MI (myocardial infarction), History of uterine cancer, Hyperlipidemia, Liver cancer ([facility] [address] Liver transplant recipient [facility]), Primary sclerosing cholangitis, Sickle cell trait ([facility] [address] Tachycardia, Thalassemia trait, Thrombocytopenia ([facility] [address] and Ulcerative colitis ([facility] Past Surgical History   has a past surgical history that includes Liver transplant ([id number]); Cholecystectomy (1987); Total knee arthroplasty (Left, 2018); hiatal hernia repair; Incisional hernia repair; and Colonoscopy (N/A, 8/1/2019). Medications  Prior to Admission medications    Medication Sig Start Date End Date Taking?  Authorizing Provider   tacrolimus (PROGRAF) 1 MG capsule Take 2 capsules by mouth 2 times daily Pls get Tacrolimus level checked in 7 days and review results w Transplant center 4/25/19  Yes Nadine Morales MD   Budesonide ER (UCERIS) 9 MG TB24 Take 1 Dose by mouth daily 4/25/19  Yes Nadine Morales MD   atorvastatin (LIPITOR) 20 MG tablet Take 20 mg by mouth daily   Yes Historical Provider, MD   Cyanocobalamin (VITAMIN B 12 PO) Take 1 tablet by mouth daily   Yes Historical Provider, MD   ursodiol (ACTIGALL) 300 MG capsule Take 600 mg by mouth 2 times daily   Yes Historical Provider, MD   metoprolol succinate (TOPROL XL) 25 MG extended release tablet Take 25 mg by mouth nightly  8/30/18  Yes Historical Provider, MD   conjugated estrogens (PREMARIN) 0.625 MG/GM vaginal cream Place 0.5 g vaginally as needed    Yes Historical Provider, MD   Cholecalciferol-Vitamin C (VITAMIN D3-VITAMIN C) 1000-500 UNIT-MG CAPS Take 1 tablet by mouth 2 times daily   Yes Historical Provider, MD   Multiple Vitamins-Iron (ONE DAILY MULTIVITAMIN/IRON PO) Take 1 tablet by mouth daily   Yes Historical Provider, MD   mesalamine (DELZICOL) 800 MG TBEC TBEC tablet Take 1,600 mg by mouth 3 times daily     Historical Provider, MD     Allergies  is allergic to prochlorperazine edisylate; codeine; compazine [prochlorperazine]; morphine; and moxifloxacin. Family History  family history includes Asthma in her father; Heart Disease in her mother; Other in her father. Social History   reports that she has never smoked. She has never used smokeless tobacco.   reports no history of alcohol use. reports no history of drug use. Marital Status   Occupation disabled  Raising her three grandchildren    OBJECTIVE:   VITALS:  height is 5' 7\" (1.702 m) and weight is 202 lb (91.6 kg). Her blood pressure is 126/78 and her pulse is 109. Her respiration is 20 and oxygen saturation is 98%. CONSTITUTIONAL:alert & oriented x 3, no acute distress. Very pleasant. SKIN:  Warm and dry, no rashes on exposed areas of skin. HEAD:  Normocephalic, atraumatic   EYES: PERRL. EOMs intact. EARS:  Hearing grossly WNL. NOSE:  Nares patent. No rhinorrhea   MOUTH/THROAT:  benign  NECK:supple, no lymphadenopathy  LUNGS: Clear to auscultation bilaterally, no wheezes. CARDIOVASCULAR: tachycardia without murmur. ABDOMEN: soft, non tender, non distended. EXTREMITIES: positive edema bilateral lower extremities. IMPRESSIONS:   1.  History of colitis, s/p liver transplant  2.  has a past medical history of Arthritis, History of MI (myocardial infarction), History of uterine cancer, Hyperlipidemia, Liver cancer [facility]), Liver transplant recipient [facility]) (2004 and 2010), Primary sclerosing cholangitis, Sickle cell trait ([facility] [address] Tachycardia, Thalassemia trait, Thrombocytopenia ([facility] [address] and Ulcerative colitis ([facility]    PLANS:   1. colonoscopy    COLE BETTS PA-C  Electronically signed 12/17/2020 at 7:52 AM

## 2020-12-17 NOTE — OP NOTE
[facility]        COLONOSCOPY    Patient:   Heber Ortiz    :    1950    Referring/PCP: Trent Haywood MD  Facility:  [address] [place] [address]  Procedure:   Colonoscopy --diagnostic  Date:     2020  Endoscopist:  Leonor Lee MD    Indication:  Follow up of UC . Anesthesia: MAC    Complications:  None      Estimated blood loss: None    Specimen collected: None       Description of Procedure:  Informed consent was obtained from the patient after explanation of the procedure including indications, description of the procedure,  benefits and possible risks and complications of the procedure, and alternatives. Questions were answered. The patient's history was reviewed and a directed physical examination was performed prior to the procedure. Patient was monitored throughout the procedure with pulse oximetry and periodic assessment of vital signs. Patient was sedated as noted above. With the patient initially in the left lateral decubitus position, a digital rectal examination was performed and revealed negative without mass, lesions or tenderness. The Olympus video colonoscope was placed in the patient's rectum and advanced without difficulty  to the transverse colon. The prep was poor. Procedure aborted The patient  was taken to the recovery area in good condition. Findings:     1. Copious brown liquid stool present precluding visualization.  Procedure aborted       Recommendations:  Repeat colon with 2 day prep at next available appointment     Leonor Lee MD

## 2020-12-17 NOTE — ANESTHESIA PRE PROCEDURE
Department of Anesthesiology  Preprocedure Note       Name:  Shay Dias   Age:  65 y.o.  :  1950                                          MRN:  [id number]         Date:  2020      Surgeon: Bree Frenchor):  Aiyana Pino MD    Procedure: COLONOSCOPY DIAGNOSTIC (N/A )    Medications prior to admission:   Prior to Admission medications    Medication Sig Start Date End Date Taking? Authorizing Provider   tacrolimus (PROGRAF) 1 MG capsule Take 2 capsules by mouth 2 times daily Pls get Tacrolimus level checked in 7 days and review results w Transplant center 19  Yes Yanni Rangel MD   Budesonide ER (UCERIS) 9 MG TB24 Take 1 Dose by mouth daily 19  Yes Yanni Rangel MD   atorvastatin (LIPITOR) 20 MG tablet Take 20 mg by mouth daily   Yes Historical Provider, MD   Cyanocobalamin (VITAMIN B 12 PO) Take 1 tablet by mouth daily   Yes Historical Provider, MD   ursodiol (ACTIGALL) 300 MG capsule Take 600 mg by mouth 2 times daily   Yes Historical Provider, MD   metoprolol succinate (TOPROL XL) 25 MG extended release tablet Take 25 mg by mouth nightly  18  Yes Historical Provider, MD   conjugated estrogens (PREMARIN) 0.625 MG/GM vaginal cream Place 0.5 g vaginally as needed    Yes Historical Provider, MD   Cholecalciferol-Vitamin C (VITAMIN D3-VITAMIN C) 1000-500 UNIT-MG CAPS Take 1 tablet by mouth 2 times daily   Yes Historical Provider, MD   Multiple Vitamins-Iron (ONE DAILY MULTIVITAMIN/IRON PO) Take 1 tablet by mouth daily   Yes Historical Provider, MD   mesalamine (DELZICOL) 800 MG TBEC TBEC tablet Take 1,600 mg by mouth 3 times daily     Historical Provider, MD       Current medications:    No current facility-administered medications for this encounter. Allergies:     Allergies   Allergen Reactions    Prochlorperazine Edisylate Other (See Comments) and Hives     Lock jaw  unspecified      Codeine Other (See Comments)     Patient becomes very lethargic and BP drops     Compazine 150/88       NPO Status: Time of last liquid consumption: 2300                        Time of last solid consumption: 1800                        Date of last liquid consumption: 12/16/20                        Date of last solid food consumption: 12/15/20    BMI:   Wt Readings from Last 3 Encounters:   12/17/20 202 lb (91.6 kg)   04/25/19 183 lb 3.2 oz (83.1 kg)     Body mass index is 31.64 kg/m². CBC:   Lab Results   Component Value Date    WBC 5.4 04/25/2019    RBC 4.19 04/25/2019    HGB 9.2 04/25/2019    HCT 30.2 04/25/2019    MCV 72.1 04/25/2019    RDW 15.2 04/25/2019     04/25/2019       CMP:   Lab Results   Component Value Date     04/25/2019    K 3.1 04/25/2019     04/25/2019    CO2 22 04/25/2019    BUN 13 04/25/2019    CREATININE 1.04 04/25/2019    GFRAA >60 04/25/2019    LABGLOM 52 04/25/2019    GLUCOSE 115 04/25/2019    PROT 6.6 04/21/2019    CALCIUM 8.3 04/25/2019    BILITOT 0.21 04/21/2019    ALKPHOS 123 04/21/2019    AST 10 04/21/2019    ALT 7 04/21/2019       POC Tests: No results for input(s): POCGLU, POCNA, POCK, POCCL, POCBUN, POCHEMO, POCHCT in the last 72 hours.     Coags:   Lab Results   Component Value Date    PROTIME 11.3 04/20/2019    INR 1.1 04/20/2019       HCG (If Applicable): No results found for: PREGTESTUR, PREGSERUM, HCG, HCGQUANT     ABGs: No results found for: PHART, PO2ART, IKF6KAA, PAA9DBJ, BEART, Y5ABIYGJ     Type & Screen (If Applicable):  No results found for: [facility]    Anesthesia Evaluation  Patient summary reviewed no history of anesthetic complications:   Airway: Mallampati: II  TM distance: >3 FB   Neck ROM: full  Mouth opening: > = 3 FB Dental:    (+) upper dentures      Pulmonary:Negative Pulmonary ROS and normal exam    (+) COPD:      (-) recent URI and not a current smoker                          ROS comment: 10 pk yrs   Cardiovascular:    (+) hypertension: moderate and no interval change, past MI: > 6 months,       ECG reviewed  Rhythm: regular  Rate: normal                    Neuro/Psych:   Negative Neuro/Psych ROS     (-) seizures and CVA           GI/Hepatic/Renal:   (+) PUD, liver disease:, renal disease: CRI,          ROS comment: UC  Liver transplant. Endo/Other: Negative Endo/Other ROS       (-) diabetes mellitus                ROS comment: SCT Abdominal:           Vascular: negative vascular ROS. Anesthesia Plan      MAC     ASA 4     (Asa 4)  Induction: intravenous. Anesthetic plan and risks discussed with patient. Plan discussed with CRNA.                   Verito Tucker MD   12/17/2020

## 2021-02-05 ENCOUNTER — HOSPITAL ENCOUNTER (OUTPATIENT)
Dept: LAB | Age: 71
Setting detail: SPECIMEN
Discharge: HOME | End: 2021-02-05
Payer: MEDICARE

## 2021-02-05 DIAGNOSIS — Z01.818 PREOP TESTING: Primary | ICD-10-CM

## 2021-02-05 PROCEDURE — U0003 INFECTIOUS AGENT DETECTION BY NUCLEIC ACID (DNA OR RNA); SEVERE ACUTE RESPIRATORY SYNDROME CORONAVIRUS 2 (SARS-COV-2) (CORONAVIRUS DISEASE [COVID-19]), AMPLIFIED PROBE TECHNIQUE, MAKING USE OF HIGH THROUGHPUT TECHNOLOGIES AS DESCRIBED BY CMS-2020-01-R: HCPCS

## 2021-02-05 PROCEDURE — U0005 INFEC AGEN DETEC AMPLI PROBE: HCPCS

## 2021-02-06 LAB
SARS-COV-2, RAPID: NORMAL
SARS-COV-2: NORMAL
SARS-COV-2: NOT DETECTED
SOURCE: NORMAL

## 2021-02-07 ENCOUNTER — TELEPHONE (OUTPATIENT)
Dept: PRIMARY CARE CLINIC | Age: 71
End: 2021-02-07

## 2021-02-09 ENCOUNTER — ANESTHESIA EVENT (OUTPATIENT)
Dept: ENDOSCOPY | Age: 71
End: 2021-02-09
Payer: MEDICARE

## 2021-02-09 ENCOUNTER — ANESTHESIA (OUTPATIENT)
Dept: ENDOSCOPY | Age: 71
End: 2021-02-09
Payer: MEDICARE

## 2021-02-09 ENCOUNTER — HOSPITAL ENCOUNTER (OUTPATIENT)
Age: 71
Setting detail: OUTPATIENT SURGERY
Discharge: HOME | End: 2021-02-09
Attending: INTERNAL MEDICINE | Admitting: INTERNAL MEDICINE
Payer: MEDICARE

## 2021-02-09 VITALS
HEIGHT: 67 IN | BODY MASS INDEX: 30.13 KG/M2 | HEART RATE: 64 BPM | WEIGHT: 192 LBS | DIASTOLIC BLOOD PRESSURE: 77 MMHG | RESPIRATION RATE: 14 BRPM | TEMPERATURE: 97.1 F | SYSTOLIC BLOOD PRESSURE: 129 MMHG | OXYGEN SATURATION: 100 %

## 2021-02-09 VITALS — SYSTOLIC BLOOD PRESSURE: 82 MMHG | DIASTOLIC BLOOD PRESSURE: 55 MMHG | RESPIRATION RATE: 30 BRPM

## 2021-02-09 PROCEDURE — 3700000000 HC ANESTHESIA ATTENDED CARE: Performed by: INTERNAL MEDICINE

## 2021-02-09 PROCEDURE — 7100000011 HC PHASE II RECOVERY - ADDTL 15 MIN: Performed by: INTERNAL MEDICINE

## 2021-02-09 PROCEDURE — 7100000010 HC PHASE II RECOVERY - FIRST 15 MIN: Performed by: INTERNAL MEDICINE

## 2021-02-09 PROCEDURE — 88305 TISSUE EXAM BY PATHOLOGIST: CPT

## 2021-02-09 PROCEDURE — 2580000003 HC RX 258: Performed by: NURSE ANESTHETIST, CERTIFIED REGISTERED

## 2021-02-09 PROCEDURE — 2709999900 HC NON-CHARGEABLE SUPPLY: Performed by: INTERNAL MEDICINE

## 2021-02-09 PROCEDURE — 6360000002 HC RX W HCPCS: Performed by: NURSE ANESTHETIST, CERTIFIED REGISTERED

## 2021-02-09 PROCEDURE — 3700000001 HC ADD 15 MINUTES (ANESTHESIA): Performed by: INTERNAL MEDICINE

## 2021-02-09 PROCEDURE — 2580000003 HC RX 258: Performed by: INTERNAL MEDICINE

## 2021-02-09 PROCEDURE — 3609010300 HC COLONOSCOPY W/BIOPSY SINGLE/MULTIPLE: Performed by: INTERNAL MEDICINE

## 2021-02-09 RX ORDER — PROPOFOL 10 MG/ML
INJECTION, EMULSION INTRAVENOUS CONTINUOUS PRN
Status: DISCONTINUED | OUTPATIENT
Start: 2021-02-09 | End: 2021-02-09 | Stop reason: SDUPTHER

## 2021-02-09 RX ORDER — SODIUM CHLORIDE 9 MG/ML
INJECTION, SOLUTION INTRAVENOUS ONCE
Status: COMPLETED | OUTPATIENT
Start: 2021-02-09 | End: 2021-02-09

## 2021-02-09 RX ORDER — SODIUM CHLORIDE 9 MG/ML
INJECTION, SOLUTION INTRAVENOUS CONTINUOUS PRN
Status: DISCONTINUED | OUTPATIENT
Start: 2021-02-09 | End: 2021-02-09 | Stop reason: SDUPTHER

## 2021-02-09 RX ORDER — PROPOFOL 10 MG/ML
INJECTION, EMULSION INTRAVENOUS PRN
Status: DISCONTINUED | OUTPATIENT
Start: 2021-02-09 | End: 2021-02-09 | Stop reason: SDUPTHER

## 2021-02-09 RX ADMIN — PROPOFOL 125 MCG/KG/MIN: 10 INJECTION, EMULSION INTRAVENOUS at 10:45

## 2021-02-09 RX ADMIN — PROPOFOL 50 MG: 10 INJECTION, EMULSION INTRAVENOUS at 10:48

## 2021-02-09 RX ADMIN — SODIUM CHLORIDE: 9 INJECTION, SOLUTION INTRAVENOUS at 09:35

## 2021-02-09 RX ADMIN — SODIUM CHLORIDE: 9 INJECTION, SOLUTION INTRAVENOUS at 10:42

## 2021-02-09 RX ADMIN — PROPOFOL 50 MG: 10 INJECTION, EMULSION INTRAVENOUS at 10:45

## 2021-02-09 ASSESSMENT — PAIN SCALES - GENERAL
PAINLEVEL_OUTOF10: 0
PAINLEVEL_OUTOF10: 0

## 2021-02-09 ASSESSMENT — PAIN - FUNCTIONAL ASSESSMENT: PAIN_FUNCTIONAL_ASSESSMENT: 0-10

## 2021-02-09 NOTE — H&P
History and Physical    Pt Name: Neeru Alejandre  MRN: [id number]  YOB: 1950  Date of evaluation: 2/9/2021    SUBJECTIVE:   History of Chief Complaint:    Patient presents for colonoscopy. She reports being diagnosed with UC in 2004. She has a history of primary sclerosing cholangitis, s/p liver transplant x 2 (2004, 2010). She had attempted colonoscopy on 12/17/20- but aborted due to poor prep- pt reports she has completed prep as directed. Past Medical History    has a past medical history of Arthritis, Colon polyp, History of chemotherapy, History of MI (myocardial infarction), History of uterine cancer, Hyperlipidemia, Liver cancer ([facility] [address] Liver transplant recipient [facility]), Primary sclerosing cholangitis, Sickle cell trait ([facility] [address] Tachycardia, Thalassemia trait, Thrombocytopenia ([facility] [address] and Ulcerative colitis ([facility] [address] Past Surgical History   has a past surgical history that includes Liver transplant ([id number]); Cholecystectomy (1987); Total knee arthroplasty (Left, 2018); hiatal hernia repair; Incisional hernia repair; Colonoscopy (N/A, 08/01/2019); Colonoscopy (N/A, 12/17/2020); and bone marrow biopsy. Medications  Prior to Admission medications    Medication Sig Start Date End Date Taking?  Authorizing Provider   tacrolimus (PROGRAF) 1 MG capsule Take 2 capsules by mouth 2 times daily Pls get Tacrolimus level checked in 7 days and review results w Transplant center 4/25/19  Yes Sammy Jo MD   Budesonide ER (UCERIS) 9 MG TB24 Take 1 Dose by mouth daily 4/25/19  Yes Sammy Jo MD   mesalamine (DELZICOL) 800 MG TBEC TBEC tablet Take 1,600 mg by mouth 3 times daily    Yes Historical Provider, MD   atorvastatin (LIPITOR) 20 MG tablet Take 20 mg by mouth daily   Yes Historical Provider, MD   Cyanocobalamin (VITAMIN B 12 PO) Take 1 tablet by mouth daily   Yes Historical Provider, MD   ursodiol (ACTIGALL) 300 MG capsule Take 600 mg by mouth 2 times daily   Yes Historical Provider, MD   metoprolol succinate (TOPROL XL) 25 MG extended release tablet Take 25 mg by mouth nightly  8/30/18  Yes Historical Provider, MD   conjugated estrogens (PREMARIN) 0.625 MG/GM vaginal cream Place 0.5 g vaginally as needed    Yes Historical Provider, MD   Cholecalciferol-Vitamin C (VITAMIN D3-VITAMIN C) 1000-500 UNIT-MG CAPS Take 1 tablet by mouth 2 times daily   Yes Historical Provider, MD   Multiple Vitamins-Iron (ONE DAILY MULTIVITAMIN/IRON PO) Take 1 tablet by mouth daily   Yes Historical Provider, MD     Allergies  is allergic to prochlorperazine edisylate; codeine; compazine [prochlorperazine]; morphine; and moxifloxacin. Family History  family history includes Asthma in her father; Heart Disease in her mother; Other in her father. Social History   reports that she has never smoked. She has never used smokeless tobacco.   reports no history of alcohol use. reports no history of drug use. Marital Status   Occupation disabled  Raising her three grandchildren    OBJECTIVE:   VITALS:  height is 5' 7\" (1.702 m) and weight is 192 lb (87.1 kg). Her infrared temperature is 96 °F (35.6 °C). Her blood pressure is 129/83 and her pulse is 75. Her respiration is 14 and oxygen saturation is 97%. CONSTITUTIONAL:alert & oriented x 3, no acute distress. Very pleasant. SKIN:  Warm and dry, no rashes on exposed areas of skin. HEAD:  Normocephalic, atraumatic   EYES: PERRL. EOMs intact. EARS:  Hearing grossly WNL. NOSE:  Nares patent. No rhinorrhea   MOUTH/THROAT:  benign  NECK:supple, no lymphadenopathy  LUNGS: Clear to auscultation bilaterally, no wheezes. CARDIOVASCULAR: RRR without murmur. ABDOMEN: soft, non tender, non distended. EXTREMITIES: trace edema bilateral lower extremities.     IMPRESSIONS:   Ulcerative colitis, s/p liver transplant  1.  has a past medical history of Arthritis, Colon polyp, History of chemotherapy, History of MI (myocardial infarction), History of uterine cancer, Hyperlipidemia, Liver cancer [facility]), Liver transplant recipient [facility]) (2004 and 2010), Primary sclerosing cholangitis, Sickle cell trait ([facility] [address] Tachycardia, Thalassemia trait, Thrombocytopenia ([facility] [address] and Ulcerative colitis ([facility]    PLANS:   alli BONE  Electronically signed 2/9/2021 at 10:14 AM

## 2021-02-09 NOTE — ANESTHESIA PRE PROCEDURE
Department of Anesthesiology  Preprocedure Note       Name:  Maurilio Little   Age:  65 y.o.  :  1950                                          MRN:  [id number]         Date:  2021      Surgeon: Edwin Sultana):  Kurt Cordero MD    Procedure: Procedure(s):  COLONOSCOPY DIAGNOSTIC    Medications prior to admission:   Prior to Admission medications    Medication Sig Start Date End Date Taking? Authorizing Provider   tacrolimus (PROGRAF) 1 MG capsule Take 2 capsules by mouth 2 times daily Pls get Tacrolimus level checked in 7 days and review results w Transplant center 19  Yes Roxanne Chacon MD   Budesonide ER (UCERIS) 9 MG TB24 Take 1 Dose by mouth daily 19  Yes Roxanne Chacon MD   mesalamine (DELZICOL) 800 MG TBEC TBEC tablet Take 1,600 mg by mouth 3 times daily    Yes Historical Provider, MD   atorvastatin (LIPITOR) 20 MG tablet Take 20 mg by mouth daily   Yes Historical Provider, MD   Cyanocobalamin (VITAMIN B 12 PO) Take 1 tablet by mouth daily   Yes Historical Provider, MD   ursodiol (ACTIGALL) 300 MG capsule Take 600 mg by mouth 2 times daily   Yes Historical Provider, MD   metoprolol succinate (TOPROL XL) 25 MG extended release tablet Take 25 mg by mouth nightly  18  Yes Historical Provider, MD   conjugated estrogens (PREMARIN) 0.625 MG/GM vaginal cream Place 0.5 g vaginally as needed    Yes Historical Provider, MD   Cholecalciferol-Vitamin C (VITAMIN D3-VITAMIN C) 1000-500 UNIT-MG CAPS Take 1 tablet by mouth 2 times daily   Yes Historical Provider, MD   Multiple Vitamins-Iron (ONE DAILY MULTIVITAMIN/IRON PO) Take 1 tablet by mouth daily   Yes Historical Provider, MD       Current medications:    No current facility-administered medications for this encounter. Allergies:     Allergies   Allergen Reactions    Prochlorperazine Edisylate Other (See Comments) and Hives     Lock jaw  unspecified      Codeine Other (See Comments)     Patient becomes very lethargic and BP drops     Compazine [Prochlorperazine] Other (See Comments)     Lock jaw     Morphine Other (See Comments)     Patient becomes very lethargic and BP drops     Moxifloxacin Rash       Problem List:    Patient Active Problem List   Diagnosis Code    SBO (small bowel obstruction) ([address] K56.609    Ulcerative colitis ([address] K51.90    Liver transplant recipient [facility]) Z94.4    Benign essential HTN I10    Sickle cell trait ([address] D57.3    KAYLA (acute kidney injury) ([address] N17.9    Stage 3 chronic kidney disease N18.30    Elevated LFTs R79.89    Severe malnutrition ([address] E43    Acute pharyngitis J02.9       Past Medical History:        Diagnosis Date    Arthritis     History of MI (myocardial infarction)     during transplant OR per patient, no stents    History of uterine cancer     Hyperlipidemia     Liver cancer ([address]     in first transplanted liver    Liver transplant recipient [facility]) 2004 and 2010    x2     Primary sclerosing cholangitis     Sickle cell trait ([address]     Tachycardia     Thalassemia trait     Thrombocytopenia ([address]     Ulcerative colitis ([address]        Past Surgical History:        Procedure Laterality Date    CHOLECYSTECTOMY  1987    COLONOSCOPY N/A 8/1/2019    COLONOSCOPY DIAGNOSTIC performed by Rocky Pagan MD at [facility]    COLONOSCOPY N/A 12/17/2020    COLONOSCOPY DIAGNOSTIC performed by Rocky Pagan MD at [address] [place] [address] LIVER TRANSPLANT  [id number]    x2    TOTAL KNEE ARTHROPLASTY Left 2018       Social History:    Social History     Tobacco Use    Smoking status: Never Smoker    Smokeless tobacco: Never Used   Substance Use Topics    Alcohol use: Never     Frequency: Never                                Counseling given: Not Answered      Vital Signs (Current):   Vitals:    02/09/21 0915 02/09/21 0925   BP:  129/83   Pulse:  75   Resp:  14   Temp: 96 °F (35.6 °C)    TempSrc: Infrared    SpO2:  97% Weight: 192 lb (87.1 kg)    Height: 5' 7\" (1.702 m)                                               BP Readings from Last 3 Encounters:   02/09/21 129/83   12/17/20 (!) 98/56   12/17/20 124/82       NPO Status: Time of last liquid consumption: 0800(sip)                        Time of last solid consumption: 1400                        Date of last liquid consumption: 02/09/21                        Date of last solid food consumption: 02/08/21    BMI:   Wt Readings from Last 3 Encounters:   02/09/21 192 lb (87.1 kg)   12/17/20 202 lb (91.6 kg)   04/25/19 183 lb 3.2 oz (83.1 kg)     Body mass index is 30.07 kg/m². CBC:   Lab Results   Component Value Date    WBC 5.4 04/25/2019    RBC 4.19 04/25/2019    HGB 9.2 04/25/2019    HCT 30.2 04/25/2019    MCV 72.1 04/25/2019    RDW 15.2 04/25/2019     04/25/2019       CMP:   Lab Results   Component Value Date     04/25/2019    K 3.1 04/25/2019     04/25/2019    CO2 22 04/25/2019    BUN 13 04/25/2019    CREATININE 1.04 04/25/2019    GFRAA >60 04/25/2019    LABGLOM 52 04/25/2019    GLUCOSE 115 04/25/2019    PROT 6.6 04/21/2019    CALCIUM 8.3 04/25/2019    BILITOT 0.21 04/21/2019    ALKPHOS 123 04/21/2019    AST 10 04/21/2019    ALT 7 04/21/2019       POC Tests: No results for input(s): POCGLU, POCNA, POCK, POCCL, POCBUN, POCHEMO, POCHCT in the last 72 hours.     Coags:   Lab Results   Component Value Date    PROTIME 11.3 04/20/2019    INR 1.1 04/20/2019       HCG (If Applicable): No results found for: PREGTESTUR, PREGSERUM, HCG, HCGQUANT     ABGs: No results found for: PHART, PO2ART, ZFJ5OHJ, YOW4VYE, BEART, S0CYCEIA     Type & Screen (If Applicable):  No results found for: LABABO, LABRH    Drug/Infectious Status (If Applicable):  No results found for: HIV, HEPCAB    COVID-19 Screening (If Applicable):   Lab Results   Component Value Date    COVID19 Not Detected 02/05/2021         Anesthesia Evaluation  Patient summary reviewed and Nursing notes reviewed no history of anesthetic complications:   Airway: Mallampati: II  TM distance: >3 FB   Neck ROM: full  Mouth opening: > = 3 FB Dental:    (+) upper dentures      Pulmonary:Negative Pulmonary ROS and normal exam                               Cardiovascular:  Exercise tolerance: good (>4 METS),   (+) hypertension:, hyperlipidemia    (-) past MI, CAD, CABG/stent and dysrhythmias    ECG reviewed  Rhythm: regular  Rate: normal  Echocardiogram reviewed         Beta Blocker:  Dose within 24 Hrs         Neuro/Psych:   (+) neuromuscular disease:,             GI/Hepatic/Renal:   (+) PUD, liver disease:,          ROS comment: S/p liver transplant x 2. Endo/Other:    (+) blood dyscrasia: anemia:., .                 Abdominal:           Vascular:                                        Anesthesia Plan      MAC and TIVA     ASA 3       Induction: intravenous. MIPS: Postoperative opioids intended and Prophylactic antiemetics administered. Anesthetic plan and risks discussed with patient.       Plan discussed with CRNA and surgical team.                  Lisseth Albert MD   2/9/2021

## 2021-02-09 NOTE — ANESTHESIA POSTPROCEDURE EVALUATION
Department of Anesthesiology  Postprocedure Note    Patient: Arron Aguirre  MRN: [id number]  YOB: 1950  Date of evaluation: 2/9/2021  Time:  1:06 PM     Procedure Summary     Date: 02/09/21 Room / Location: [address] [place] [address]    Anesthesia Start: 4474 Anesthesia Stop: 6398    Procedure: COLONOSCOPY WITH BIOPSY (N/A ) Diagnosis: (ULCERATIVE COLITIS)    Surgeons: Cathleen Pope MD Responsible Provider: Fiona Guthrie MD    Anesthesia Type: MAC, TIVA ASA Status: 3          Anesthesia Type: MAC, TIVA    Adeola Phase I: Adeola Score: 10    Adeola Phase II: Adeola Score: 10    Last vitals: Reviewed and per EMR flowsheets.        Anesthesia Post Evaluation    Patient location during evaluation: PACU  Patient participation: complete - patient participated  Level of consciousness: awake and alert  Pain score: 0  Airway patency: patent  Nausea & Vomiting: no nausea and no vomiting  Complications: no  Cardiovascular status: hemodynamically stable  Respiratory status: room air  Hydration status: euvolemic

## 2021-02-09 NOTE — OP NOTE
[facility]        COLONOSCOPY    Patient:   Yanet Villasenor    :    1950    Referring/PCP: Glenis Ulloa MD  Facility:  [facility]  Procedure:   Colonoscopy with biopsy, polypectomy (cold biopsy)  Date:     2021  Endoscopist:  Jeremie Alberts MD    Indication:  Follow up of UC. Anesthesia: MAC    Complications:  None      Estimated blood loss: Minimal    Specimen collected: Yes    Scope withdrawal time:16    Description of Procedure:  Informed consent was obtained from the patient after explanation of the procedure including indications, description of the procedure,  benefits and possible risks and complications of the procedure, and alternatives. Questions were answered. The patient's history was reviewed and a directed physical examination was performed prior to the procedure. Patient was monitored throughout the procedure with pulse oximetry and periodic assessment of vital signs. Patient was sedated as noted above. With the patient initially in the left lateral decubitus position, a digital rectal examination was performed and revealed negative without mass, lesions or tenderness. The Olympus video colonoscope was placed in the patient's rectum and advanced without difficulty  to the terminal ileum. The prep was good. Examination of the mucosa was performed during both introduction and withdrawal of the colonoscope. Retroflexed view of the rectum was performed. The patient  was taken to the recovery area in good condition. Findings:     1. Normal appearing terminal ileum. 2. 4 mm sessile polyp seen in the descending colon. Cold forcep polypectomy done. 3. Rest of the colon appeared within normal limits. Biopsies done from the right, left side of colon and rectum. 4. Internal hemorrhoids seen during retroflexion view      Recommendations:   Follow with the biopsy

## 2021-02-11 LAB — SURGICAL PATHOLOGY REPORT: NORMAL

## 2021-08-10 ENCOUNTER — HOSPITAL ENCOUNTER (OUTPATIENT)
Age: 71
Setting detail: SPECIMEN
Discharge: HOME | End: 2021-08-10
Payer: MEDICARE

## 2021-08-12 LAB
CHLAMYDIA BY THIN PREP: NEGATIVE
N. GONORRHOEAE DNA, THIN PREP: NEGATIVE
SPECIMEN DESCRIPTION: NORMAL

## 2021-08-26 LAB — CYTOLOGY REPORT: NORMAL

## 2022-07-27 ENCOUNTER — ANESTHESIA EVENT (OUTPATIENT)
Dept: ENDOSCOPY | Age: 72
End: 2022-07-27
Payer: MEDICARE

## 2022-07-28 ENCOUNTER — ANESTHESIA (OUTPATIENT)
Dept: ENDOSCOPY | Age: 72
End: 2022-07-28
Payer: MEDICARE

## 2022-07-28 ENCOUNTER — HOSPITAL ENCOUNTER (OUTPATIENT)
Age: 72
Setting detail: OUTPATIENT SURGERY
Discharge: HOME | End: 2022-07-28
Attending: INTERNAL MEDICINE | Admitting: INTERNAL MEDICINE
Payer: MEDICARE

## 2022-07-28 VITALS
HEART RATE: 67 BPM | RESPIRATION RATE: 17 BRPM | DIASTOLIC BLOOD PRESSURE: 68 MMHG | WEIGHT: 172 LBS | SYSTOLIC BLOOD PRESSURE: 94 MMHG | OXYGEN SATURATION: 96 % | HEIGHT: 67 IN | TEMPERATURE: 97.9 F | BODY MASS INDEX: 27 KG/M2

## 2022-07-28 DIAGNOSIS — Z87.19 H/O ULCERATIVE COLITIS: ICD-10-CM

## 2022-07-28 PROCEDURE — 7100000011 HC PHASE II RECOVERY - ADDTL 15 MIN: Performed by: INTERNAL MEDICINE

## 2022-07-28 PROCEDURE — 3700000000 HC ANESTHESIA ATTENDED CARE: Performed by: INTERNAL MEDICINE

## 2022-07-28 PROCEDURE — 3609010300 HC COLONOSCOPY W/BIOPSY SINGLE/MULTIPLE: Performed by: INTERNAL MEDICINE

## 2022-07-28 PROCEDURE — 6360000002 HC RX W HCPCS: Performed by: NURSE ANESTHETIST, CERTIFIED REGISTERED

## 2022-07-28 PROCEDURE — 2709999900 HC NON-CHARGEABLE SUPPLY: Performed by: INTERNAL MEDICINE

## 2022-07-28 PROCEDURE — 88341 IMHCHEM/IMCYTCHM EA ADD ANTB: CPT

## 2022-07-28 PROCEDURE — 88342 IMHCHEM/IMCYTCHM 1ST ANTB: CPT

## 2022-07-28 PROCEDURE — 2580000003 HC RX 258: Performed by: INTERNAL MEDICINE

## 2022-07-28 PROCEDURE — 3700000001 HC ADD 15 MINUTES (ANESTHESIA): Performed by: INTERNAL MEDICINE

## 2022-07-28 PROCEDURE — 88305 TISSUE EXAM BY PATHOLOGIST: CPT

## 2022-07-28 PROCEDURE — 7100000010 HC PHASE II RECOVERY - FIRST 15 MIN: Performed by: INTERNAL MEDICINE

## 2022-07-28 RX ORDER — ASPIRIN 81 MG/1
81 TABLET ORAL DAILY
COMMUNITY

## 2022-07-28 RX ORDER — PROPOFOL 10 MG/ML
INJECTION, EMULSION INTRAVENOUS CONTINUOUS PRN
Status: DISCONTINUED | OUTPATIENT
Start: 2022-07-28 | End: 2022-07-28 | Stop reason: SDUPTHER

## 2022-07-28 RX ORDER — PROPOFOL 10 MG/ML
INJECTION, EMULSION INTRAVENOUS PRN
Status: DISCONTINUED | OUTPATIENT
Start: 2022-07-28 | End: 2022-07-28 | Stop reason: SDUPTHER

## 2022-07-28 RX ORDER — SODIUM CHLORIDE 9 MG/ML
INJECTION, SOLUTION INTRAVENOUS ONCE
Status: COMPLETED | OUTPATIENT
Start: 2022-07-28 | End: 2022-07-28

## 2022-07-28 RX ADMIN — PROPOFOL 70 MG: 10 INJECTION, EMULSION INTRAVENOUS at 10:58

## 2022-07-28 RX ADMIN — PROPOFOL 150 MCG/KG/MIN: 10 INJECTION, EMULSION INTRAVENOUS at 10:58

## 2022-07-28 RX ADMIN — SODIUM CHLORIDE: 9 INJECTION, SOLUTION INTRAVENOUS at 10:14

## 2022-07-28 ASSESSMENT — PAIN - FUNCTIONAL ASSESSMENT: PAIN_FUNCTIONAL_ASSESSMENT: 0-10

## 2022-07-28 ASSESSMENT — PAIN SCALES - GENERAL: PAINLEVEL_OUTOF10: 0

## 2022-07-28 NOTE — DISCHARGE INSTRUCTIONS
[facility]    POST-ENDOSCOPY INSTRUCTIONS    1. ACTIVITY   No driving, operating machinery, or making important decisions for 24 hours. Resume normal activity after 24 hours. You may return to work after 24 hours. 2. DIET        Resume your usual diet unless specified below. ***    3. MEDICATIONS    Resume your usual medications. Do not consume alcohol, tranquilizers, or sleeping medications for 24 hour unless advised by your physician. 4. PHYSICIAN FOLLOW-UP / INSTRUCTIONS    Please call the office/clinic in 10 days for biopsy results:      [  ] GI office:  [phone]          Follow up with your family physician as planned. 6. NORMAL CHANGES YOU MAY EXPERIENCE AFTER ENDOSCOPY:          COLONOSCOPY      Passing of gas for several hours. Some mild abdominal cramping. You may feel fatigued for the next 24-48   hours due to the prep and sedation    7. CALL YOUR PHYSICIAN IF YOU EXPERIENCE ANY OF THE FOLLOWING      A. Passing blood rectally or vomiting blood (color may be red or black)      B. Severe abdominal pain or tenderness (that is not relieved by passing air)      C.   Fever, chills, or excessive sweating      D.  Persistent nausea or vomiting      E.  Redness or swelling at the IV site    If you have additional questions, PLEASE call your doctor or the [place] [id number] [facility] ([phone])

## 2022-07-28 NOTE — H&P
History and Physical    Pt Name: Edwin Palmer  MRN: [id number]  YOB: 1950  Date of evaluation: 7/28/2022  Primary Care Physician: Nhi Jimenez MD    SUBJECTIVE:   History of Chief Complaint:    Edwin Palmer is a 66 y.o. female who is scheduled today for COLONOSCOPY. Patient reports she has a history of liver cancer and s/p liver transplant, as well as history of UC so she has colonoscopies every year. Patient denies any recent diarrhea, constipation, rectal bleeding or abdominal pain. She does state she has UC flare ups every few months, about two within the last six months which she states is normal for her. Allergies  is allergic to prochlorperazine edisylate, codeine, compazine [prochlorperazine], morphine, and moxifloxacin. Medications  Prior to Admission medications    Medication Sig Start Date End Date Taking? Authorizing Provider   aspirin 81 MG EC tablet Take 81 mg by mouth in the morning.    Yes Historical Provider, MD   tacrolimus (PROGRAF) 1 MG capsule Take 2 capsules by mouth 2 times daily Pls get Tacrolimus level checked in 7 days and review results w Transplant center 4/25/19   Christina Chambers MD   Budesonide ER (UCERIS) 9 MG TB24 Take 1 Dose by mouth daily 4/25/19   Christina Chambers MD   mesalamine (DELZICOL) 800 MG TBEC TBEC tablet Take 1,600 mg by mouth 3 times daily     Historical Provider, MD   atorvastatin (LIPITOR) 20 MG tablet Take 20 mg by mouth daily    Historical Provider, MD   Cyanocobalamin (VITAMIN B 12 PO) Take 1 tablet by mouth daily    Historical Provider, MD   ursodiol (ACTIGALL) 300 MG capsule Take 600 mg by mouth 2 times daily    Historical Provider, MD   metoprolol succinate (TOPROL XL) 25 MG extended release tablet Take 25 mg by mouth nightly  8/30/18   Historical Provider, MD   conjugated estrogens (PREMARIN) 0.625 MG/GM vaginal cream Place 0.5 g vaginally as needed     Historical Provider, MD   Cholecalciferol-Vitamin C (VITAMIN D3-VITAMIN C) 1000-500 UNIT-MG

## 2022-07-28 NOTE — PROGRESS NOTES
DISCHARGE CRITERIA   MET INSTRUCTIONS GIVEN ABDOMEN SOFT  IV DISCONTINUED SITE CLEAR PATIENT DENIES ANY PAIN

## 2022-07-28 NOTE — ANESTHESIA POSTPROCEDURE EVALUATION
Department of Anesthesiology  Postprocedure Note    Patient: Art Allan  MRN: [id number]  YOB: 1950  Date of evaluation: 7/28/2022      Procedure Summary     Date: 07/28/22 Room / Location: [address] [place] [address]    Anesthesia Start: 1049 Anesthesia Stop: 5655    Procedure: COLONOSCOPY WITH BIOPSY Diagnosis:       H/O ulcerative colitis      (Vladimir Tejeda)    Surgeons: Nancy Briseno MD Responsible Provider: Britt Duran MD    Anesthesia Type: MAC ASA Status: 3          Anesthesia Type: No value filed.     Adeola Phase I:      Adeola Phase II: Adeola Score: 10      Anesthesia Post Evaluation    Patient location during evaluation: PACU  Patient participation: complete - patient participated  Level of consciousness: awake  Airway patency: patent  Nausea & Vomiting: no nausea and no vomiting  Complications: no  Cardiovascular status: hemodynamically stable  Respiratory status: acceptable  Hydration status: stable

## 2022-07-28 NOTE — ANESTHESIA PRE PROCEDURE
Department of Anesthesiology  Preprocedure Note       Name:  Nesha Bradford   Age:  66 y.o.  :  1950                                          MRN:  [id number]         Date:  2022      Surgeon: Gonzalez Lemos):  Miley Peterson MD    Procedure: Procedure(s):  COLONOSCOPY    Medications prior to admission:   Prior to Admission medications    Medication Sig Start Date End Date Taking? Authorizing Provider   tacrolimus (PROGRAF) 1 MG capsule Take 2 capsules by mouth 2 times daily Pls get Tacrolimus level checked in 7 days and review results w Transplant center 19   Christina Chambers MD   Budesonide ER (UCERIS) 9 MG TB24 Take 1 Dose by mouth daily 19   Christina Chambers MD   mesalamine (DELZICOL) 800 MG TBEC TBEC tablet Take 1,600 mg by mouth 3 times daily     Historical Provider, MD   atorvastatin (LIPITOR) 20 MG tablet Take 20 mg by mouth daily    Historical Provider, MD   Cyanocobalamin (VITAMIN B 12 PO) Take 1 tablet by mouth daily    Historical Provider, MD   ursodiol (ACTIGALL) 300 MG capsule Take 600 mg by mouth 2 times daily    Historical Provider, MD   metoprolol succinate (TOPROL XL) 25 MG extended release tablet Take 25 mg by mouth nightly  18   Historical Provider, MD   conjugated estrogens (PREMARIN) 0.625 MG/GM vaginal cream Place 0.5 g vaginally as needed     Historical Provider, MD   Cholecalciferol-Vitamin C (VITAMIN D3-VITAMIN C) 1000-500 UNIT-MG CAPS Take 1 tablet by mouth 2 times daily    Historical Provider, MD   Multiple Vitamins-Iron (ONE DAILY MULTIVITAMIN/IRON PO) Take 1 tablet by mouth daily    Historical Provider, MD       Current medications:    No current facility-administered medications for this visit. No current outpatient medications on file.      Facility-Administered Medications Ordered in Other Visits   Medication Dose Route Frequency Provider Last Rate Last Admin    0.9 % sodium chloride infusion   IntraVENous Once Miley Peterson MD           Allergies: Allergies   Allergen Reactions    Prochlorperazine Edisylate Other (See Comments) and Hives     Lock jaw  unspecified      Codeine Other (See Comments)     Patient becomes very lethargic and BP drops     Compazine [Prochlorperazine] Other (See Comments)     Lock jaw     Morphine Other (See Comments)     Patient becomes very lethargic and BP drops     Moxifloxacin Rash       Problem List:    Patient Active Problem List   Diagnosis Code    SBO (small bowel obstruction) ([facility] K56.609    Ulcerative colitis ([facility] K51.90    Liver transplant recipient [facility]) Z94.4    Benign essential HTN I10    Sickle cell trait ([facility] D57.3    KAYLA (acute kidney injury) ([facility] [facility] N17.9    Stage 3 chronic kidney disease ([facility] N18.30    Elevated LFTs R79.89    Severe malnutrition ([facility] E43    Acute pharyngitis J02.9       Past Medical History:        Diagnosis Date    Arthritis     Colon polyp     History of chemotherapy     History of MI (myocardial infarction)     during transplant OR per patient, no stents    History of uterine cancer     Hyperlipidemia     Liver cancer ([address]     in first transplanted liver    Liver transplant recipient [facility]) 2004 and 2010    x2     Primary sclerosing cholangitis     Sickle cell trait (HCC)     Tachycardia     Thalassemia trait     Thrombocytopenia (HCC)     Ulcerative colitis ([address]        Past Surgical History:        Procedure Laterality Date    BONE MARROW BIOPSY      CHOLECYSTECTOMY  1987    COLONOSCOPY N/A 08/01/2019    COLONOSCOPY DIAGNOSTIC performed by Miley Peterson MD at [facility]    COLONOSCOPY N/A 12/17/2020    COLONOSCOPY DIAGNOSTIC performed by Miley Peterson MD at [facility]    COLONOSCOPY N/A 2/9/2021    COLONOSCOPY WITH BIOPSY performed by Miley Peterson MD at [facility]    [address] LIVER TRANSPLANT  [phone]    x2    TOTAL KNEE ARTHROPLASTY Left 2018       Social History: Social History     Tobacco Use    Smoking status: Never    Smokeless tobacco: Never   Substance Use Topics    Alcohol use: Never                                Counseling given: Not Answered      Vital Signs (Current): There were no vitals filed for this visit. BP Readings from Last 3 Encounters:   07/28/22 113/73   02/09/21 129/77   02/09/21 (!) 82/55       NPO Status:                                                                                 BMI:   Wt Readings from Last 3 Encounters:   07/28/22 172 lb (78 kg)   02/09/21 192 lb (87.1 kg)   12/17/20 202 lb (91.6 kg)     There is no height or weight on file to calculate BMI.    CBC:   Lab Results   Component Value Date/Time    WBC 5.4 04/25/2019 05:14 AM    RBC 4.19 04/25/2019 05:14 AM    HGB 9.2 04/25/2019 05:14 AM    HCT 30.2 04/25/2019 05:14 AM    MCV 72.1 04/25/2019 05:14 AM    RDW 15.2 04/25/2019 05:14 AM     04/25/2019 05:14 AM       CMP:   Lab Results   Component Value Date/Time     04/25/2019 05:14 AM    K 3.1 04/25/2019 05:14 AM     04/25/2019 05:14 AM    CO2 22 04/25/2019 05:14 AM    BUN 13 04/25/2019 05:14 AM    CREATININE 1.04 04/25/2019 05:14 AM    GFRAA >60 04/25/2019 05:14 AM    LABGLOM 52 04/25/2019 05:14 AM    GLUCOSE 115 04/25/2019 05:14 AM    PROT 6.6 04/21/2019 05:20 AM    CALCIUM 8.3 04/25/2019 05:14 AM    BILITOT 0.21 04/21/2019 05:20 AM    ALKPHOS 123 04/21/2019 05:20 AM    AST 10 04/21/2019 05:20 AM    ALT 7 04/21/2019 05:20 AM       POC Tests: No results for input(s): POCGLU, POCNA, POCK, POCCL, POCBUN, POCHEMO, POCHCT in the last 72 hours.     Coags:   Lab Results   Component Value Date/Time    PROTIME 11.3 04/20/2019 04:57 AM    INR 1.1 04/20/2019 04:57 AM       HCG (If Applicable): No results found for: PREGTESTUR, PREGSERUM, HCG, HCGQUANT     ABGs: No results found for: PHART, PO2ART, WKX8XSX, PCP0QQQ, BEART, S9GXGNHY     Type & Screen (If Applicable):  No results found for: Donald Choi    Drug/Infectious Status (If Applicable):  No results found for: HIV, HEPCAB    COVID-19 Screening (If Applicable):   Lab Results   Component Value Date/Time    COVID19 Not Detected 02/05/2021 11:40 AM         Anesthesia Evaluation  Patient summary reviewed and Nursing notes reviewed no history of anesthetic complications:   Airway: Mallampati: II  TM distance: >3 FB   Neck ROM: full  Mouth opening: > = 3 FB   Dental:    (+) upper dentures and edentulous      Pulmonary:Negative Pulmonary ROS and normal exam                               Cardiovascular:  Exercise tolerance: good (>4 METS),   (+) hypertension:, hyperlipidemia    (-) past MI, CAD, CABG/stent and dysrhythmias    ECG reviewed  Rhythm: regular  Rate: normal  Echocardiogram reviewed         Beta Blocker:  Dose within 24 Hrs         Neuro/Psych:   (+) neuromuscular disease:,             GI/Hepatic/Renal:   (+) PUD, liver disease:,          ROS comment: S/p liver transplant x 2. Endo/Other:    (+) blood dyscrasia: anemia:., .                 Abdominal:             Vascular: Other Findings:             Anesthesia Plan      MAC     ASA 3       Induction: intravenous. Anesthetic plan and risks discussed with patient. Use of blood products discussed with patient whom consented to blood products. Plan discussed with CRNA.                     Katharina Smith MD   7/28/2022

## 2022-07-28 NOTE — OP NOTE
Operative Note      Patient: Shailesh Fletcher  YOB: 1950  MRN: [id number]    Date of Procedure: 7/28/2022    Pre-Op Diagnosis: ULCERTIVE COLITIS    Post-Op Diagnosis: Same       Procedure(s):  COLONOSCOPY WITH BIOPSY    Surgeon(s):  Kristen Johnson MD    Assistant:   * No surgical staff found *    Anesthesia: Monitor Anesthesia Care    Estimated Blood Loss (mL): Minimal    Complications: None    Specimens:   ID Type Source Tests Collected by Time Destination   A : cecum bx- include CMV & HSV Tissue Cecum SURGICAL PATHOLOGY Kristen Johnson MD 7/28/2022 1113    B : ascending colon bx- include CMV & HSV Tissue Colon-Ascending SURGICAL PATHOLOGY Kristen Johnson MD 7/28/2022 1117    C : rectal bx-include CMV & HSV Tissue Rectum SURGICAL PATHOLOGY Kristen Johnson MD 7/28/2022 1107    D : transverse colon polyp Tissue Colon-Transverse SURGICAL PATHOLOGY Kristen Johnson MD 7/28/2022 1118    E : transverse colon bx- include CMV & HSV Tissue Colon-Transverse SURGICAL PATHOLOGY Kristen Johnson MD 7/28/2022 1121    F : sigmoid colon bx- include CMV & HSV Tissue Sigmoid Colon SURGICAL PATHOLOGY Kristen Johnson MD 7/28/2022 1122    G : descending colon bx- include CMV & HSV Tissue Colon-Descending SURGICAL PATHOLOGY Kristen Johnson MD 7/28/2022 1123        Implants:  * No implants in log *      Drains: * No LDAs found *      Scope withdrawal time: 18 min     Description of Procedure:  Informed consent was obtained from the patient after explanation of the procedure including indications, description of the procedure,  benefits and possible risks and complications of the procedure, and alternatives. Questions were answered. The patient's history was reviewed and a directed physical examination was performed prior to the procedure. Patient was monitored throughout the procedure with pulse oximetry and periodic assessment of vital signs.  Patient was sedated as noted above. With the patient initially in the left lateral decubitus position, a digital rectal examination was performed and revealed negative without mass, lesions or tenderness. The Olympus video colonoscope was placed in the patient's rectum and advanced without difficulty  to the terminal ileum. The prep was fair. Examination of the mucosa was performed during both introduction and withdrawal of the colonoscope. Retroflexed view of the rectum was performed. The patient  was taken to the recovery area in good condition. Findings:     1. Normal appearing terminal ileum. 2. Moderate inflammation characterized by diffuse erythema was seen in the cecum . Biopsies were done. 3. Moderate inflammation characterized by erythema and diffuse granular mucosa was seen in the proximal Ascending colon . Biopsies were done. 4. 5 mm sessile polyp seen in the Transverse colon . Cold forcep's polypectomy was done. 5. Rest of the colon including Transverse, Descending , Sigmoid and Rectum appeared within normal limits. Biopsied   6. Sigmoid diverticulosis   7. Internal hemorrhoids seen during retroflexion view      Recommendations:   Follow with the biopsy results                                        Repeat colon in 1 year                                       Follow up at the GI office     Re Gilmore MD       Electronically signed by Bessy Andrew MD on 7/28/2022 at 11:29 AM

## 2022-08-01 LAB — SURGICAL PATHOLOGY REPORT: NORMAL

## 2023-05-30 ENCOUNTER — HOSPITAL ENCOUNTER (OUTPATIENT)
Dept: VASCULAR LAB | Age: 73
Discharge: HOME | End: 2023-05-30
Payer: MEDICARE

## 2023-05-30 DIAGNOSIS — I65.23 OCCLUSION AND STENOSIS OF BILATERAL CAROTID ARTERIES: ICD-10-CM

## 2023-05-30 PROCEDURE — 93880 EXTRACRANIAL BILAT STUDY: CPT

## 2023-07-12 ENCOUNTER — HOSPITAL ENCOUNTER (OUTPATIENT)
Dept: PREADMISSION TESTING | Age: 73
Discharge: HOME | End: 2023-07-12
Payer: MEDICARE

## 2023-07-12 VITALS
BODY MASS INDEX: 25.61 KG/M2 | SYSTOLIC BLOOD PRESSURE: 108 MMHG | DIASTOLIC BLOOD PRESSURE: 68 MMHG | HEART RATE: 64 BPM | TEMPERATURE: 97.7 F | HEIGHT: 68 IN | WEIGHT: 169 LBS | RESPIRATION RATE: 18 BRPM

## 2023-07-12 LAB
ALBUMIN SERPL-MCNC: 3.5 G/DL (ref 3.5–5.2)
ALP SERPL-CCNC: 522 U/L (ref 35–104)
ALT SERPL-CCNC: 25 U/L (ref 5–33)
ANION GAP SERPL CALCULATED.3IONS-SCNC: 12 MMOL/L (ref 9–17)
AST SERPL-CCNC: 26 U/L
BILIRUB DIRECT SERPL-MCNC: 0.1 MG/DL
BILIRUB INDIRECT SERPL-MCNC: 0.2 MG/DL (ref 0–1)
BILIRUB SERPL-MCNC: 0.3 MG/DL (ref 0.3–1.2)
BUN SERPL-MCNC: 25 MG/DL (ref 8–23)
BUN/CREAT SERPL: 16 (ref 9–20)
CALCIUM SERPL-MCNC: 9.4 MG/DL (ref 8.6–10.4)
CHLORIDE SERPL-SCNC: 104 MMOL/L (ref 98–107)
CO2 SERPL-SCNC: 21 MMOL/L (ref 20–31)
CREAT SERPL-MCNC: 1.6 MG/DL (ref 0.5–0.9)
ERYTHROCYTE [DISTWIDTH] IN BLOOD BY AUTOMATED COUNT: 15.2 % (ref 11.8–14.4)
GFR SERPL CREATININE-BSD FRML MDRD: 34 ML/MIN/1.73M2
GLUCOSE SERPL-MCNC: 237 MG/DL (ref 70–99)
HCT VFR BLD AUTO: 35.2 % (ref 36.3–47.1)
HGB BLD-MCNC: 10.5 G/DL (ref 11.9–15.1)
INR PPP: 1.2
MCH RBC QN AUTO: 23.1 PG (ref 25.2–33.5)
MCHC RBC AUTO-ENTMCNC: 29.8 G/DL (ref 28.4–34.8)
MCV RBC AUTO: 77.5 FL (ref 82.6–102.9)
NRBC BLD-RTO: 0 PER 100 WBC
PARTIAL THROMBOPLASTIN TIME: 29.4 SEC (ref 23.9–33.8)
PLATELET # BLD AUTO: 211 K/UL (ref 138–453)
PMV BLD AUTO: 12.5 FL (ref 8.1–13.5)
POTASSIUM SERPL-SCNC: 4.2 MMOL/L (ref 3.7–5.3)
PROT SERPL-MCNC: 7.4 G/DL (ref 6.4–8.3)
PROTHROMBIN TIME: 14.5 SEC (ref 11.5–14.2)
RBC # BLD AUTO: 4.54 M/UL (ref 3.95–5.11)
SODIUM SERPL-SCNC: 137 MMOL/L (ref 135–144)
WBC OTHER # BLD: 7.1 K/UL (ref 3.5–11.3)

## 2023-07-12 PROCEDURE — 85610 PROTHROMBIN TIME: CPT

## 2023-07-12 PROCEDURE — 85730 THROMBOPLASTIN TIME PARTIAL: CPT

## 2023-07-12 PROCEDURE — 85027 COMPLETE CBC AUTOMATED: CPT

## 2023-07-12 PROCEDURE — 36415 COLL VENOUS BLD VENIPUNCTURE: CPT

## 2023-07-12 PROCEDURE — 80048 BASIC METABOLIC PNL TOTAL CA: CPT

## 2023-07-12 PROCEDURE — 80076 HEPATIC FUNCTION PANEL: CPT

## 2023-07-12 RX ORDER — SACUBITRIL AND VALSARTAN 24; 26 MG/1; MG/1
0.5 TABLET, FILM COATED ORAL 2 TIMES DAILY
COMMUNITY
Start: 2022-01-06

## 2023-07-12 RX ORDER — FUROSEMIDE 20 MG/1
20 TABLET ORAL DAILY PRN
COMMUNITY

## 2023-07-12 NOTE — PRE-PROCEDURE INSTRUCTIONS
clothing that will fit over them. In case of illness - If you have cold or flu like symptoms (high fever, runny nose, sore throat, cough, etc.) rash, nausea, vomiting, loose stools, and/or recent contact with someone who has a contagious disease (chicken pox, measles, etc.) Please call your doctor before coming to the hospital.     Day of Surgery/Procedure:    As a patient at [facility] INPAT[facility] you can expect quality medical and nursing care that is centered on your individual needs. Our goal is to make your surgical experience as comfortable as possible    . Transportation After Your Surgery/Procedure: You will need a friend or family member to drive you home after your procedure. Your  must be 25years of age or older and able to sign off on your discharge instructions. A taxi cab or any other form of public transportation is not acceptable. Your friend or family member must stay at the hospital throughout your procedure. Someone must remain with you for the first 24 hours after your surgery if you receive anesthesia or medication. If you do not have someone to stay with you, your procedure may be cancelled.       If you have any other questions regarding your procedure or the day of surgery, please call [phone]      _________________________  ____________________________  Signature (Patient)              Signature (Provider) & date

## 2023-07-14 NOTE — FLOWSHEET NOTE
07/14/23 1318   Anesthesia PAT Clearance   Anesthesia Review Status Anes has reviewed patient for surgery  (Dr. Janel Arroyo reviewed history,labs,cardiac info and is requesting medical due to increased creatinine level and cardiac. left a voice message to call to confirm this request.)

## 2023-07-28 ENCOUNTER — HOSPITAL ENCOUNTER (OUTPATIENT)
Age: 73
Setting detail: OUTPATIENT SURGERY
Discharge: HOME | End: 2023-07-28
Attending: OTOLARYNGOLOGY | Admitting: OTOLARYNGOLOGY
Payer: MEDICARE

## 2023-07-28 ENCOUNTER — ANESTHESIA EVENT (OUTPATIENT)
Dept: OPERATING ROOM | Age: 73
End: 2023-07-28
Payer: MEDICARE

## 2023-07-28 ENCOUNTER — ANESTHESIA (OUTPATIENT)
Dept: OPERATING ROOM | Age: 73
End: 2023-07-28
Payer: MEDICARE

## 2023-07-28 VITALS
OXYGEN SATURATION: 96 % | SYSTOLIC BLOOD PRESSURE: 161 MMHG | RESPIRATION RATE: 15 BRPM | HEART RATE: 69 BPM | TEMPERATURE: 98.6 F | DIASTOLIC BLOOD PRESSURE: 97 MMHG

## 2023-07-28 DIAGNOSIS — J32.3 CHRONIC SPHENOIDAL SINUSITIS: ICD-10-CM

## 2023-07-28 DIAGNOSIS — R51.9 FACIAL PAIN: ICD-10-CM

## 2023-07-28 DIAGNOSIS — G89.18 POSTOPERATIVE PAIN: Primary | ICD-10-CM

## 2023-07-28 DIAGNOSIS — R09.81 NASAL CONGESTION: ICD-10-CM

## 2023-07-28 DIAGNOSIS — J32.2 CHRONIC ETHMOIDAL SINUSITIS: ICD-10-CM

## 2023-07-28 PROCEDURE — 2720000010 HC SURG SUPPLY STERILE: Performed by: OTOLARYNGOLOGY

## 2023-07-28 PROCEDURE — 7100000001 HC PACU RECOVERY - ADDTL 15 MIN: Performed by: OTOLARYNGOLOGY

## 2023-07-28 PROCEDURE — 88305 TISSUE EXAM BY PATHOLOGIST: CPT

## 2023-07-28 PROCEDURE — 3700000000 HC ANESTHESIA ATTENDED CARE: Performed by: OTOLARYNGOLOGY

## 2023-07-28 PROCEDURE — 3600000003 HC SURGERY LEVEL 3 BASE: Performed by: OTOLARYNGOLOGY

## 2023-07-28 PROCEDURE — 87075 CULTR BACTERIA EXCEPT BLOOD: CPT

## 2023-07-28 PROCEDURE — 2500000003 HC RX 250 WO HCPCS: Performed by: NURSE ANESTHETIST, CERTIFIED REGISTERED

## 2023-07-28 PROCEDURE — 6360000002 HC RX W HCPCS: Performed by: OTOLARYNGOLOGY

## 2023-07-28 PROCEDURE — 6370000000 HC RX 637 (ALT 250 FOR IP): Performed by: ANESTHESIOLOGY

## 2023-07-28 PROCEDURE — 6360000002 HC RX W HCPCS: Performed by: NURSE ANESTHETIST, CERTIFIED REGISTERED

## 2023-07-28 PROCEDURE — 6370000000 HC RX 637 (ALT 250 FOR IP): Performed by: OTOLARYNGOLOGY

## 2023-07-28 PROCEDURE — 3700000001 HC ADD 15 MINUTES (ANESTHESIA): Performed by: OTOLARYNGOLOGY

## 2023-07-28 PROCEDURE — 2709999900 HC NON-CHARGEABLE SUPPLY: Performed by: OTOLARYNGOLOGY

## 2023-07-28 PROCEDURE — 2500000003 HC RX 250 WO HCPCS: Performed by: OTOLARYNGOLOGY

## 2023-07-28 PROCEDURE — 3600000013 HC SURGERY LEVEL 3 ADDTL 15MIN: Performed by: OTOLARYNGOLOGY

## 2023-07-28 PROCEDURE — 87205 SMEAR GRAM STAIN: CPT

## 2023-07-28 PROCEDURE — 6360000002 HC RX W HCPCS: Performed by: ANESTHESIOLOGY

## 2023-07-28 PROCEDURE — 87077 CULTURE AEROBIC IDENTIFY: CPT

## 2023-07-28 PROCEDURE — 7100000011 HC PHASE II RECOVERY - ADDTL 15 MIN: Performed by: OTOLARYNGOLOGY

## 2023-07-28 PROCEDURE — 7100000000 HC PACU RECOVERY - FIRST 15 MIN: Performed by: OTOLARYNGOLOGY

## 2023-07-28 PROCEDURE — 87070 CULTURE OTHR SPECIMN AEROBIC: CPT

## 2023-07-28 PROCEDURE — 7100000010 HC PHASE II RECOVERY - FIRST 15 MIN: Performed by: OTOLARYNGOLOGY

## 2023-07-28 PROCEDURE — 87102 FUNGUS ISOLATION CULTURE: CPT

## 2023-07-28 PROCEDURE — 2580000003 HC RX 258: Performed by: ANESTHESIOLOGY

## 2023-07-28 RX ORDER — HYDROMORPHONE HYDROCHLORIDE 1 MG/ML
0.5 INJECTION, SOLUTION INTRAMUSCULAR; INTRAVENOUS; SUBCUTANEOUS EVERY 5 MIN PRN
Status: DISCONTINUED | OUTPATIENT
Start: 2023-07-28 | End: 2023-07-28 | Stop reason: HOSPADM

## 2023-07-28 RX ORDER — LIDOCAINE HYDROCHLORIDE AND EPINEPHRINE 10; 10 MG/ML; UG/ML
INJECTION, SOLUTION INFILTRATION; PERINEURAL PRN
Status: DISCONTINUED | OUTPATIENT
Start: 2023-07-28 | End: 2023-07-28 | Stop reason: ALTCHOICE

## 2023-07-28 RX ORDER — SODIUM CHLORIDE 0.9 % (FLUSH) 0.9 %
5-40 SYRINGE (ML) INJECTION PRN
Status: DISCONTINUED | OUTPATIENT
Start: 2023-07-28 | End: 2023-07-28 | Stop reason: HOSPADM

## 2023-07-28 RX ORDER — SODIUM CHLORIDE, SODIUM LACTATE, POTASSIUM CHLORIDE, CALCIUM CHLORIDE 600; 310; 30; 20 MG/100ML; MG/100ML; MG/100ML; MG/100ML
INJECTION, SOLUTION INTRAVENOUS CONTINUOUS
Status: DISCONTINUED | OUTPATIENT
Start: 2023-07-28 | End: 2023-07-28 | Stop reason: HOSPADM

## 2023-07-28 RX ORDER — CLINDAMYCIN PHOSPHATE 600 MG/50ML
600 INJECTION INTRAVENOUS ONCE
Status: COMPLETED | OUTPATIENT
Start: 2023-07-28 | End: 2023-07-28

## 2023-07-28 RX ORDER — SODIUM CHLORIDE 9 MG/ML
INJECTION, SOLUTION INTRAVENOUS CONTINUOUS
Status: DISCONTINUED | OUTPATIENT
Start: 2023-07-28 | End: 2023-07-28 | Stop reason: HOSPADM

## 2023-07-28 RX ORDER — ONDANSETRON 2 MG/ML
4 INJECTION INTRAMUSCULAR; INTRAVENOUS
Status: COMPLETED | OUTPATIENT
Start: 2023-07-28 | End: 2023-07-28

## 2023-07-28 RX ORDER — CLINDAMYCIN HYDROCHLORIDE 300 MG/1
300 CAPSULE ORAL 2 TIMES DAILY
Qty: 14 CAPSULE | Refills: 0 | Status: SHIPPED | OUTPATIENT
Start: 2023-07-28 | End: 2023-08-04

## 2023-07-28 RX ORDER — OXYCODONE HYDROCHLORIDE AND ACETAMINOPHEN 5; 325 MG/1; MG/1
1 TABLET ORAL
Status: COMPLETED | OUTPATIENT
Start: 2023-07-28 | End: 2023-07-28

## 2023-07-28 RX ORDER — SODIUM CHLORIDE 0.9 % (FLUSH) 0.9 %
5-40 SYRINGE (ML) INJECTION EVERY 12 HOURS SCHEDULED
Status: DISCONTINUED | OUTPATIENT
Start: 2023-07-28 | End: 2023-07-28 | Stop reason: HOSPADM

## 2023-07-28 RX ORDER — LIDOCAINE HYDROCHLORIDE 10 MG/ML
1 INJECTION, SOLUTION EPIDURAL; INFILTRATION; INTRACAUDAL; PERINEURAL
Status: DISCONTINUED | OUTPATIENT
Start: 2023-07-28 | End: 2023-07-28 | Stop reason: HOSPADM

## 2023-07-28 RX ORDER — DEXAMETHASONE SODIUM PHOSPHATE 10 MG/ML
INJECTION INTRAMUSCULAR; INTRAVENOUS PRN
Status: DISCONTINUED | OUTPATIENT
Start: 2023-07-28 | End: 2023-07-28 | Stop reason: SDUPTHER

## 2023-07-28 RX ORDER — OXYCODONE HYDROCHLORIDE AND ACETAMINOPHEN 5; 325 MG/1; MG/1
1 TABLET ORAL EVERY 6 HOURS PRN
Qty: 12 TABLET | Refills: 0 | Status: SHIPPED | OUTPATIENT
Start: 2023-07-28 | End: 2023-07-31

## 2023-07-28 RX ORDER — FENTANYL CITRATE 50 UG/ML
25 INJECTION, SOLUTION INTRAMUSCULAR; INTRAVENOUS EVERY 5 MIN PRN
Status: DISCONTINUED | OUTPATIENT
Start: 2023-07-28 | End: 2023-07-28 | Stop reason: HOSPADM

## 2023-07-28 RX ORDER — SODIUM CHLORIDE 9 MG/ML
INJECTION, SOLUTION INTRAVENOUS PRN
Status: DISCONTINUED | OUTPATIENT
Start: 2023-07-28 | End: 2023-07-28 | Stop reason: HOSPADM

## 2023-07-28 RX ORDER — PROPOFOL 10 MG/ML
INJECTION, EMULSION INTRAVENOUS PRN
Status: DISCONTINUED | OUTPATIENT
Start: 2023-07-28 | End: 2023-07-28 | Stop reason: SDUPTHER

## 2023-07-28 RX ORDER — OXYMETAZOLINE HYDROCHLORIDE 0.05 G/100ML
SPRAY NASAL PRN
Status: DISCONTINUED | OUTPATIENT
Start: 2023-07-28 | End: 2023-07-28 | Stop reason: ALTCHOICE

## 2023-07-28 RX ORDER — LIDOCAINE HYDROCHLORIDE 20 MG/ML
INJECTION, SOLUTION EPIDURAL; INFILTRATION; INTRACAUDAL; PERINEURAL PRN
Status: DISCONTINUED | OUTPATIENT
Start: 2023-07-28 | End: 2023-07-28 | Stop reason: SDUPTHER

## 2023-07-28 RX ADMIN — CLINDAMYCIN PHOSPHATE 600 MG: 600 INJECTION, SOLUTION INTRAVENOUS at 12:10

## 2023-07-28 RX ADMIN — LIDOCAINE HYDROCHLORIDE 50 MG: 20 INJECTION, SOLUTION EPIDURAL; INFILTRATION; INTRACAUDAL; PERINEURAL at 12:14

## 2023-07-28 RX ADMIN — PROPOFOL 150 MG: 10 INJECTION, EMULSION INTRAVENOUS at 12:14

## 2023-07-28 RX ADMIN — SODIUM CHLORIDE, POTASSIUM CHLORIDE, SODIUM LACTATE AND CALCIUM CHLORIDE: 600; 310; 30; 20 INJECTION, SOLUTION INTRAVENOUS at 10:33

## 2023-07-28 RX ADMIN — ONDANSETRON 4 MG: 2 INJECTION INTRAMUSCULAR; INTRAVENOUS at 13:17

## 2023-07-28 RX ADMIN — OXYCODONE AND ACETAMINOPHEN 1 TABLET: 5; 325 TABLET ORAL at 14:05

## 2023-07-28 RX ADMIN — DEXAMETHASONE SODIUM PHOSPHATE 10 MG: 10 INJECTION INTRAMUSCULAR; INTRAVENOUS at 12:16

## 2023-07-28 RX ADMIN — HYDROMORPHONE HYDROCHLORIDE 0.5 MG: 1 INJECTION, SOLUTION INTRAMUSCULAR; INTRAVENOUS; SUBCUTANEOUS at 13:42

## 2023-07-28 ASSESSMENT — PAIN SCALES - GENERAL
PAINLEVEL_OUTOF10: 7
PAINLEVEL_OUTOF10: 3
PAINLEVEL_OUTOF10: 7

## 2023-07-28 ASSESSMENT — PAIN DESCRIPTION - DESCRIPTORS: DESCRIPTORS: BURNING

## 2023-07-28 ASSESSMENT — PAIN DESCRIPTION - PAIN TYPE: TYPE: SURGICAL PAIN

## 2023-07-28 ASSESSMENT — PAIN DESCRIPTION - FREQUENCY: FREQUENCY: INTERMITTENT

## 2023-07-28 ASSESSMENT — PAIN - FUNCTIONAL ASSESSMENT: PAIN_FUNCTIONAL_ASSESSMENT: 0-10

## 2023-07-28 ASSESSMENT — PAIN DESCRIPTION - ORIENTATION: ORIENTATION: RIGHT

## 2023-07-28 ASSESSMENT — PAIN DESCRIPTION - LOCATION: LOCATION: NOSE

## 2023-07-28 NOTE — H&P
History and Physical Service   [address] [place] [address]     HISTORY AND PHYSICAL EXAMINATION            Date of Evaluation: 7/28/2023  Patient name:  Selvin Inman  MRN:   [id number]  YOB: 1950  PCP:    Renan Aguirre MD    History Obtained From:     Patient, medical records    History of Present Illness: This is Selvin Inman a 74 y.o. female who presents today for a RIGHT ETHMOIDECTOMY  RIGHT SPHENOIDOTOMY   IMAGE GUIDED by Adrianne Ruiz MD for nasal congestion. The pt has a hx of chronic sinusitis, ct sinus showed complete opacification of the right sphenoid and right ethmoid sinus with septal deviation to the left with a nasal spur. She has not been on antibiotics for her sinus's recently, reports a normal sense of smell. Denies fever, chills, shortness of breath, cough, congestion, wheezing, chest pain, open sores or wounds. The pt received medical clearance on 7/6/23     The pt received cardiology clearance on 7/25/23 from dr Ratna Tipton  The pt does not have DM  The pt is not taking any anticoagulation medications    Past Medical History:     Past Medical History:   Diagnosis Date    Arthritis     CAD (coronary artery disease)     Colon polyp     COVID-19 02/2022    mild cold symptoms    GERD (gastroesophageal reflux disease)     Glaucoma     per pt \"beginning stage of glaucoma\"    History of blood transfusion     no reaction    History of chemotherapy     first liver transplant, they found cancer but it was dorminant.  So with second transplant had chemo    History of MI (myocardial infarction)     during second transplant pt had a MI during OR per patient, no stents    History of uterine cancer 1995    had complete hysterectomy    Hyperlipidemia     Liver cancer ([address])     in first transplanted liver    Liver transplant recipient [facility]) 2004 and 2010    x2     NSTEMI (non-ST elevated myocardial infarction) ([address])     Primary sclerosing cholangitis     Sickle cell trait ([address]) Marylen Deter, MD   mesalamine (DELZICOL) 800 MG TBEC TBEC tablet Take 2 tablets by mouth 3 times daily prn    Historical Provider, MD   atorvastatin (LIPITOR) 20 MG tablet Take 1 tablet by mouth daily    Historical Provider, MD   Cyanocobalamin (VITAMIN B 12 PO) Take 1 tablet by mouth daily 1200mg PO daily    Historical Provider, MD   ursodiol (ACTIGALL) 300 MG capsule Take 2 capsules by mouth 2 times daily    Historical Provider, MD   metoprolol succinate (TOPROL XL) 25 MG extended release tablet Take 2 tablets by mouth in the morning and at bedtime 8/30/18   Historical Provider, MD   conjugated estrogens (PREMARIN) 0.625 MG/GM vaginal cream Place 0.5 g vaginally as needed    Historical Provider, MD   Cholecalciferol-Vitamin C (VITAMIN D3-VITAMIN C) 1000-500 UNIT-MG CAPS Take 1 tablet by mouth daily    Historical Provider, MD   Multiple Vitamins-Iron (ONE DAILY MULTIVITAMIN/IRON PO) Take 1 tablet by mouth daily    Historical Provider, MD        Allergies:     Prochlorperazine edisylate, Codeine, Compazine [prochlorperazine], Morphine, and Moxifloxacin    Social History:     Tobacco:    reports that she has never smoked. She has never used smokeless tobacco.  Alcohol:      reports no history of alcohol use. Drug Use:  reports no history of drug use. Family History:     Family History   Problem Relation Age of Onset    Heart Disease Mother     Other Father         colon disease    Asthma Father        Review of Systems:     Positive and Negative as described in HPI. CONSTITUTIONAL:  negative for fevers, chills, sweats, fatigue, weight loss  HEENT: glaucoma negative for vision, hearing changes, runny nose, throat pain  RESPIRATORY:  negative for shortness of breath, cough, congestion, wheezing. CARDIOVASCULAR: cad nstemi hld negative for chest pain, palpitations.   GASTROINTESTINAL: gerd ulcerative colitis PSC on actigall  and budesinide er s/p liver transplant x2, on prograf with the first transplant the

## 2023-07-28 NOTE — ANESTHESIA PRE PROCEDURE
Department of Anesthesiology  Preprocedure Note       Name:  Kathryn Lancaster   Age:  74 y.o.  :  1950                                          MRN:  [id number]         Date:  2023      Surgeon: Edward Harden): Nisreen Thompson MD    Procedure: Procedure(s):  RIGHT ETHMOIDECTOMY  RIGHT SPHENOIDOTOMY   IMAGE GUIDED    Medications prior to admission:   Prior to Admission medications    Medication Sig Start Date End Date Taking?  Authorizing Provider   furosemide (LASIX) 20 MG tablet Take 1 tablet by mouth daily as needed    Historical Provider, MD   dapagliflozin (FARXIGA) 10 MG tablet Take 0.5 tablets by mouth in the morning and at bedtime 22   Historical Provider, MD   sacubitril-valsartan (ENTRESTO) 24-26 MG per tablet Take 0.5 tablets by mouth 2 times daily 22   Historical Provider, MD   aspirin 81 MG EC tablet Take 1 tablet by mouth daily    Historical Provider, MD   tacrolimus (PROGRAF) 1 MG capsule Take 2 capsules by mouth 2 times daily Pls get Tacrolimus level checked in 7 days and review results w Transplant center 19   Christina Chambers MD   Budesonide ER (UCERIS) 9 MG TB24 Take 1 Dose by mouth daily  Patient not taking: Reported on 2023   Christina Chambers MD   mesalamine (DELZICOL) 800 MG TBEC TBEC tablet Take 2 tablets by mouth 3 times daily prn    Historical Provider, MD   atorvastatin (LIPITOR) 20 MG tablet Take 1 tablet by mouth daily    Historical Provider, MD   Cyanocobalamin (VITAMIN B 12 PO) Take 1 tablet by mouth daily 1200mg PO daily    Historical Provider, MD   ursodiol (ACTIGALL) 300 MG capsule Take 2 capsules by mouth 2 times daily    Historical Provider, MD   metoprolol succinate (TOPROL XL) 25 MG extended release tablet Take 2 tablets by mouth in the morning and at bedtime 18   Historical Provider, MD   conjugated estrogens (PREMARIN) 0.625 MG/GM vaginal cream Place 0.5 g vaginally as needed    Historical Provider, MD   Cholecalciferol-Vitamin C (VITAMIN

## 2023-07-28 NOTE — DISCHARGE INSTRUCTIONS
[phone]   [phone]      POST-OPERATIVE INSTRUCTIONS: NASAL SURGERY      Activity: Rest today. Do not drive or operate machinery for 24 hours. Do not bend over. Bending increase the pressure in the area of  the surgery and may cause bleeding. Do not engage in any exertional activity, like heavy lifting or  exercise for at least two weeks. Diet:  As Tolerated     General: Crusting and scabbing in the nose is expected for 3-4 weeks. Do not blow your nose hard for 2 weeks     Use a vaporizer or humidifier at bedside     Medications: Use a saline nasal spray 3 times a day in each nares. Use Afrin nasal spray, follow directions on the bottle     Office Visit: Call today for an appointment in 10-14 days    Observe for: Small amount of bloody discharge from the nose is expected for 7 - 10 days, but any fresh bleeding should be reported to your physician. Call your physician if you have any problems that concern you.

## 2023-07-28 NOTE — ANESTHESIA POSTPROCEDURE EVALUATION
Department of Anesthesiology  Postprocedure Note    Patient: Matt Palma  MRN: [id number]  YOB: 1950  Date of evaluation: 7/28/2023      Procedure Summary     Date: 07/28/23 Room / Location: [address] - INPATIENT    Anesthesia Start: 1210 Anesthesia Stop: 1300    Procedure: RIGHT ETHMOIDECTOMY  RIGHT SPHENOIDOTOMY   IMAGE GUIDED (Right) Diagnosis:       Chronic ethmoidal sinusitis      Chronic sphenoidal sinusitis      Nasal congestion      Facial pain      (Chronic ethmoidal sinusitis [J32.2])      (Chronic sphenoidal sinusitis [J32.3])      (Nasal congestion [R09.81])      (Facial pain [R51.9])    Surgeons: Chela Arriaza MD Responsible Provider: Goldie Draper DO    Anesthesia Type: general ASA Status: 3          Anesthesia Type: No value filed.     Adeola Phase I: Adeola Score: 9    Adeola Phase II:        Anesthesia Post Evaluation    Patient location during evaluation: PACU  Patient participation: complete - patient participated  Level of consciousness: awake and alert  Airway patency: patent  Nausea & Vomiting: no nausea and no vomiting  Complications: no  Cardiovascular status: hemodynamically stable  Respiratory status: acceptable  Hydration status: stable  Pain management: adequate

## 2023-07-28 NOTE — OP NOTE
Operative Note      Patient: Alexsander Kirkland  YOB: 1950  MRN: [id number]    Date of Procedure: 7/28/2023    Pre-Op Diagnosis Codes:     * Chronic ethmoidal sinusitis [J32.2]     * Chronic sphenoidal sinusitis [J32.3]     * Nasal congestion [R09.81]     * Facial pain [R51.9]    Post-Op Diagnosis: Same       Procedure(s):  Computer Aided Stereotactic Endoscopic Right Total Ethmoidectomy  Computer Aided Stereotactic Endoscopic Right Sphenoidotomy with removal of diseased tissue    Surgeon(s): Merna Lancaster MD    Assistant:   * No surgical staff found *    Anesthesia: General    Estimated Blood Loss (mL): Minimal    Complications: None    Specimens:   ID Type Source Tests Collected by Time Destination   1 : Right Sphenoid Sinus Contents Tissue Sinus CULTURE, FUNGUS, CULTURE, ANAEROBIC AND AEROBIC Lashanda Hurd MD 7/28/2023 1237    A : Right Sphenoid Sinus Contents Tissue Sinus SURGICAL PATHOLOGY Lashanda Lai MD 7/28/2023 1238        Implants:  * No implants in log *      Drains: * No LDAs found *    Findings: chronic infected debris in sphenoid    Detailed Description of Procedure: Indications:    Alexsander Kirkland has had chronic/recurrent sinus infections that have been aggressively treated with antibiotics and steroids. Despite this treatment the infections are refractory to medical management and require surgical interventions. The risks of the surgery were reviewed which included bleeding, injury to orbital structures resulting in change/loss in vision, CSF leak, injury to brain, persistent infections, and possibly additional surgery. All questions where answered and informed consent was obtained. Operation:  The patient was identified in the preoperative holding area and brought to the operating room. The patient was prepped and draped in usual fashion. The navigation equipment was brought into the field and landmarks were mapped. The accuracy was verified.   The sinonasal passages were

## 2023-07-30 LAB
MICROORGANISM SPEC CULT: ABNORMAL
MICROORGANISM SPEC CULT: ABNORMAL
MICROORGANISM/AGENT SPEC: ABNORMAL
MICROORGANISM/AGENT SPEC: ABNORMAL
SPECIMEN DESCRIPTION: ABNORMAL

## 2023-07-31 LAB
MICROORGANISM SPEC CULT: NORMAL
SPECIMEN DESCRIPTION: NORMAL

## 2023-08-02 LAB
MICROORGANISM SPEC CULT: ABNORMAL
MICROORGANISM SPEC CULT: ABNORMAL
MICROORGANISM/AGENT SPEC: ABNORMAL
MICROORGANISM/AGENT SPEC: ABNORMAL
SPECIMEN DESCRIPTION: ABNORMAL
SURGICAL PATHOLOGY REPORT: NORMAL

## 2023-08-07 LAB
MICROORGANISM SPEC CULT: NORMAL
SPECIMEN DESCRIPTION: NORMAL

## 2023-08-14 LAB
MICROORGANISM SPEC CULT: NORMAL
SPECIMEN DESCRIPTION: NORMAL

## 2023-08-21 LAB
MICROORGANISM SPEC CULT: NORMAL
SPECIMEN DESCRIPTION: NORMAL

## 2023-08-28 LAB
MICROORGANISM SPEC CULT: NORMAL
SPECIMEN DESCRIPTION: NORMAL

## 2024-03-27 ENCOUNTER — APPOINTMENT (OUTPATIENT)
Dept: GENERAL RADIOLOGY | Age: 74
End: 2024-03-27
Payer: OTHER MISCELLANEOUS

## 2024-03-27 ENCOUNTER — APPOINTMENT (OUTPATIENT)
Dept: CT IMAGING | Age: 74
End: 2024-03-27
Payer: OTHER MISCELLANEOUS

## 2024-03-27 ENCOUNTER — HOSPITAL ENCOUNTER (EMERGENCY)
Age: 74
Discharge: HOME | End: 2024-03-27
Attending: EMERGENCY MEDICINE
Payer: OTHER MISCELLANEOUS

## 2024-03-27 VITALS
BODY MASS INDEX: 23.7 KG/M2 | TEMPERATURE: 97.5 F | OXYGEN SATURATION: 97 % | HEIGHT: 67 IN | HEART RATE: 79 BPM | RESPIRATION RATE: 18 BRPM | SYSTOLIC BLOOD PRESSURE: 141 MMHG | WEIGHT: 151 LBS | DIASTOLIC BLOOD PRESSURE: 92 MMHG
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
## 2024-03-27 PROCEDURE — 6370000000 HC RX 637 (ALT 250 FOR IP): Performed by: EMERGENCY MEDICINE

## 2024-03-27 PROCEDURE — 93005 ELECTROCARDIOGRAM TRACING: CPT | Performed by: EMERGENCY MEDICINE

## 2024-03-27 PROCEDURE — 71101 X-RAY EXAM UNILAT RIBS/CHEST: CPT

## 2024-03-27 PROCEDURE — 72125 CT NECK SPINE W/O DYE: CPT

## 2024-03-27 PROCEDURE — 99284 EMERGENCY DEPT VISIT MOD MDM: CPT

## 2024-03-27 PROCEDURE — 70450 CT HEAD/BRAIN W/O DYE: CPT

## 2024-03-27 RX ORDER — LIDOCAINE HYDROCHLORIDE 20 MG/ML
JELLY TOPICAL PRN
Status: DISCONTINUED | OUTPATIENT
Start: 2024-03-27 | End: 2024-03-27 | Stop reason: SDUPTHER

## 2024-03-27 RX ADMIN — Medication 3 ML: at 14:45

## 2024-03-27 ASSESSMENT — PAIN SCALES - GENERAL: PAINLEVEL_OUTOF10: 10

## 2024-03-27 ASSESSMENT — PAIN - FUNCTIONAL ASSESSMENT: PAIN_FUNCTIONAL_ASSESSMENT: 0-10
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
## 2024-03-29 LAB
EKG ATRIAL RATE: 71 BPM
EKG P AXIS: 69 DEGREES
EKG P-R INTERVAL: 172 MS
EKG Q-T INTERVAL: 400 MS
EKG QRS DURATION: 82 MS
EKG QTC CALCULATION (BAZETT): 434 MS
EKG R AXIS: -35 DEGREES
EKG T AXIS: -2 DEGREES
EKG VENTRICULAR RATE: 71 BPM

## 2025-02-16 ENCOUNTER — HOSPITAL ENCOUNTER (EMERGENCY)
Age: 75
Discharge: HOME | End: 2025-02-16
Attending: EMERGENCY MEDICINE
Payer: MEDICARE

## 2025-02-16 ENCOUNTER — APPOINTMENT (OUTPATIENT)
Dept: GENERAL RADIOLOGY | Age: 75
End: 2025-02-16
Payer: MEDICARE

## 2025-02-16 VITALS
BODY MASS INDEX: 23.49 KG/M2 | WEIGHT: 150 LBS | OXYGEN SATURATION: 99 % | SYSTOLIC BLOOD PRESSURE: 134 MMHG | TEMPERATURE: 98.4 F | HEART RATE: 94 BPM | RESPIRATION RATE: 18 BRPM | DIASTOLIC BLOOD PRESSURE: 100 MMHG

## 2025-02-16 DIAGNOSIS — B34.9 VIRAL ILLNESS: Primary | ICD-10-CM

## 2025-02-16 LAB
ALBUMIN SERPL-MCNC: 3.9 G/DL (ref 3.5–5.2)
ALBUMIN/GLOB SERPL: 0.8 {RATIO} (ref 1–2.5)
ALP SERPL-CCNC: 644 U/L (ref 35–104)
ALT SERPL-CCNC: 27 U/L (ref 10–35)
ANION GAP SERPL CALCULATED.3IONS-SCNC: 18 MMOL/L (ref 9–16)
AST SERPL-CCNC: 46 U/L (ref 10–35)
BASOPHILS # BLD: <0.03 K/UL (ref 0–0.2)
BASOPHILS NFR BLD: 1 % (ref 0–2)
BILIRUB DIRECT SERPL-MCNC: 0.3 MG/DL (ref 0–0.2)
BILIRUB INDIRECT SERPL-MCNC: 0.3 MG/DL
BILIRUB SERPL-MCNC: 0.6 MG/DL (ref 0–1.2)
BUN SERPL-MCNC: 24 MG/DL (ref 8–23)
CALCIUM SERPL-MCNC: 9.9 MG/DL (ref 8.8–10.2)
CHLORIDE SERPL-SCNC: 95 MMOL/L (ref 98–107)
CO2 SERPL-SCNC: 20 MMOL/L (ref 20–31)
CREAT SERPL-MCNC: 1.5 MG/DL (ref 0.5–0.9)
EOSINOPHIL # BLD: <0.03 K/UL (ref 0–0.44)
EOSINOPHILS RELATIVE PERCENT: 0 % (ref 1–4)
ERYTHROCYTE [DISTWIDTH] IN BLOOD BY AUTOMATED COUNT: 15.6 % (ref 11.8–14.4)
GFR, ESTIMATED: 35 ML/MIN/1.73M2
GLUCOSE SERPL-MCNC: 167 MG/DL (ref 82–115)
HCT VFR BLD AUTO: 41.6 % (ref 36.3–47.1)
HGB BLD-MCNC: 13.5 G/DL (ref 11.9–15.1)
IMM GRANULOCYTES # BLD AUTO: 0.02 K/UL (ref 0–0.3)
IMM GRANULOCYTES NFR BLD: 1 %
LYMPHOCYTES NFR BLD: 1.37 K/UL (ref 1.1–3.7)
LYMPHOCYTES RELATIVE PERCENT: 31 % (ref 24–43)
MCH RBC QN AUTO: 23.5 PG (ref 25.2–33.5)
MCHC RBC AUTO-ENTMCNC: 32.5 G/DL (ref 28.4–34.8)
MCV RBC AUTO: 72.3 FL (ref 82.6–102.9)
MONOCYTES NFR BLD: 0.42 K/UL (ref 0.1–1.2)
MONOCYTES NFR BLD: 10 % (ref 3–12)
NEUTROPHILS NFR BLD: 59 % (ref 36–65)
NEUTS SEG NFR BLD: 2.59 K/UL (ref 1.5–8.1)
NRBC BLD-RTO: 0 PER 100 WBC
PLATELET # BLD AUTO: 186 K/UL (ref 138–453)
PMV BLD AUTO: 12.4 FL (ref 8.1–13.5)
POTASSIUM SERPL-SCNC: 3.7 MMOL/L (ref 3.7–5.3)
PROT SERPL-MCNC: 9 G/DL (ref 6.6–8.7)
RBC # BLD AUTO: 5.75 M/UL (ref 3.95–5.11)
RBC # BLD: ABNORMAL 10*6/UL
SODIUM SERPL-SCNC: 133 MMOL/L (ref 136–145)
WBC OTHER # BLD: 4.4 K/UL (ref 3.5–11.3)

## 2025-02-16 PROCEDURE — 6360000002 HC RX W HCPCS: Performed by: NURSE PRACTITIONER

## 2025-02-16 PROCEDURE — 2580000003 HC RX 258: Performed by: NURSE PRACTITIONER

## 2025-02-16 PROCEDURE — 96361 HYDRATE IV INFUSION ADD-ON: CPT

## 2025-02-16 PROCEDURE — 96376 TX/PRO/DX INJ SAME DRUG ADON: CPT

## 2025-02-16 PROCEDURE — 71045 X-RAY EXAM CHEST 1 VIEW: CPT

## 2025-02-16 PROCEDURE — 85025 COMPLETE CBC W/AUTO DIFF WBC: CPT

## 2025-02-16 PROCEDURE — 80048 BASIC METABOLIC PNL TOTAL CA: CPT

## 2025-02-16 PROCEDURE — 80076 HEPATIC FUNCTION PANEL: CPT

## 2025-02-16 PROCEDURE — 96375 TX/PRO/DX INJ NEW DRUG ADDON: CPT

## 2025-02-16 PROCEDURE — 6370000000 HC RX 637 (ALT 250 FOR IP): Performed by: NURSE PRACTITIONER

## 2025-02-16 PROCEDURE — 96374 THER/PROPH/DIAG INJ IV PUSH: CPT

## 2025-02-16 PROCEDURE — 99284 EMERGENCY DEPT VISIT MOD MDM: CPT

## 2025-02-16 PROCEDURE — 96372 THER/PROPH/DIAG INJ SC/IM: CPT

## 2025-02-16 PROCEDURE — 2500000003 HC RX 250 WO HCPCS: Performed by: NURSE PRACTITIONER

## 2025-02-16 RX ORDER — ACETAMINOPHEN 325 MG/1
650 TABLET ORAL ONCE
Status: COMPLETED | OUTPATIENT
Start: 2025-02-16 | End: 2025-02-16

## 2025-02-16 RX ORDER — ONDANSETRON 2 MG/ML
4 INJECTION INTRAMUSCULAR; INTRAVENOUS ONCE
Status: COMPLETED | OUTPATIENT
Start: 2025-02-16 | End: 2025-02-16

## 2025-02-16 RX ORDER — 0.9 % SODIUM CHLORIDE 0.9 %
1000 INTRAVENOUS SOLUTION INTRAVENOUS ONCE
Status: COMPLETED | OUTPATIENT
Start: 2025-02-16 | End: 2025-02-16

## 2025-02-16 RX ORDER — ONDANSETRON 4 MG/1
4 TABLET, ORALLY DISINTEGRATING ORAL EVERY 8 HOURS PRN
Qty: 12 TABLET | Refills: 0 | Status: SHIPPED | OUTPATIENT
Start: 2025-02-16

## 2025-02-16 RX ORDER — KETOROLAC TROMETHAMINE 15 MG/ML
15 INJECTION, SOLUTION INTRAMUSCULAR; INTRAVENOUS ONCE
Status: COMPLETED | OUTPATIENT
Start: 2025-02-16 | End: 2025-02-16

## 2025-02-16 RX ORDER — ACETAMINOPHEN 500 MG
500 TABLET ORAL EVERY 4 HOURS PRN
Qty: 15 TABLET | Refills: 0 | Status: SHIPPED | OUTPATIENT
Start: 2025-02-16

## 2025-02-16 RX ORDER — KETOROLAC TROMETHAMINE 15 MG/ML
15 INJECTION, SOLUTION INTRAMUSCULAR; INTRAVENOUS ONCE
Status: DISCONTINUED | OUTPATIENT
Start: 2025-02-16 | End: 2025-02-16

## 2025-02-16 RX ADMIN — ONDANSETRON 4 MG: 2 INJECTION, SOLUTION INTRAMUSCULAR; INTRAVENOUS at 19:21

## 2025-02-16 RX ADMIN — SODIUM CHLORIDE 1000 ML: 9 INJECTION, SOLUTION INTRAVENOUS at 19:22

## 2025-02-16 RX ADMIN — KETOROLAC TROMETHAMINE 15 MG: 15 INJECTION, SOLUTION INTRAMUSCULAR; INTRAVENOUS at 21:22

## 2025-02-16 RX ADMIN — ACETAMINOPHEN 650 MG: 325 TABLET ORAL at 20:19

## 2025-02-16 RX ADMIN — FAMOTIDINE 20 MG: 10 INJECTION, SOLUTION INTRAVENOUS at 19:21

## 2025-02-16 RX ADMIN — ONDANSETRON 4 MG: 2 INJECTION, SOLUTION INTRAMUSCULAR; INTRAVENOUS at 22:57

## 2025-02-16 ASSESSMENT — PAIN DESCRIPTION - LOCATION: LOCATION: ABDOMEN;BACK

## 2025-02-16 ASSESSMENT — PAIN SCALES - GENERAL
PAINLEVEL_OUTOF10: 9
PAINLEVEL_OUTOF10: 8
PAINLEVEL_OUTOF10: 10
PAINLEVEL_OUTOF10: 10

## 2025-02-16 ASSESSMENT — PAIN DESCRIPTION - DESCRIPTORS: DESCRIPTORS: ACHING

## 2025-02-16 ASSESSMENT — PAIN - FUNCTIONAL ASSESSMENT: PAIN_FUNCTIONAL_ASSESSMENT: 0-10

## 2025-02-17 NOTE — ED PROVIDER NOTES
Findings were discussed. She was given medication with improvement. Prescriptions were provided for Zofran and Tylenol. Follow up with pcp for a recheck, further evaluation and treatment. Evaluation and treatment course in the ED, and plan of care upon discharge was discussed in length with the patient. Patient had no further questions prior to being discharged and was instructed to return to the ED for new or worsening symptoms.          FINAL IMPRESSION      1. Viral illness            Problem List  Patient Active Problem List   Diagnosis Code    SBO (small bowel obstruction) (HCC) K56.609    Ulcerative colitis (HCC) K51.90    Liver transplant recipient (HCC) Z94.4    Benign essential HTN I10    Sickle cell trait (HCC) D57.3    KAYLA (acute kidney injury) (HCC) N17.9    Stage 3 chronic kidney disease (HCC) N18.30    Elevated LFTs R79.89    Severe malnutrition (HCC) E43    Acute pharyngitis J02.9         DISPOSITION/PLAN   DISPOSITION Decision To Discharge 02/16/2025 11:38:24 PM               PATIENT REFERRED TO:   Ashly Childs MD  [address] [place] [address]  [phone]    Schedule an appointment as soon as possible for a visit       [facility] Emergency Department  [address] [place] [address]  [phone]    If symptoms worsen, As needed      DISCHARGE MEDICATIONS:     Discharge Medication List as of 2/16/2025 11:31 PM        START taking these medications    Details   ondansetron (ZOFRAN-ODT) 4 MG disintegrating tablet Take 1 tablet by mouth every 8 hours as needed for Nausea or Vomiting, Disp-12 tablet, R-0Normal      acetaminophen (TYLENOL) 500 MG tablet Take 1 tablet by mouth every 4 hours as needed for Pain or Fever, Disp-15 tablet, R-0Normal                 (Please note that portions of this note were completed with a voice recognition program.  Efforts were made to edit the dictations but occasionally words are mis-transcribed.)    MILAGRO Medrano - HUEY Madsen,

## 2025-03-25 ENCOUNTER — HOSPITAL ENCOUNTER (EMERGENCY)
Facility: CLINIC | Age: 75
Discharge: HOME | End: 2025-03-25
Attending: EMERGENCY MEDICINE
Payer: MEDICARE

## 2025-03-25 VITALS
BODY MASS INDEX: 25.84 KG/M2 | RESPIRATION RATE: 14 BRPM | TEMPERATURE: 98.1 F | HEART RATE: 96 BPM | WEIGHT: 165 LBS | OXYGEN SATURATION: 98 % | DIASTOLIC BLOOD PRESSURE: 82 MMHG | SYSTOLIC BLOOD PRESSURE: 155 MMHG

## 2025-03-25 DIAGNOSIS — J02.9 ACUTE PHARYNGITIS, UNSPECIFIED ETIOLOGY: Primary | ICD-10-CM

## 2025-03-25 LAB
SPECIMEN SOURCE: NORMAL
STREP A, MOLECULAR: NEGATIVE

## 2025-03-25 PROCEDURE — 87651 STREP A DNA AMP PROBE: CPT

## 2025-03-25 PROCEDURE — 99283 EMERGENCY DEPT VISIT LOW MDM: CPT

## 2025-03-25 ASSESSMENT — PAIN SCALES - GENERAL: PAINLEVEL_OUTOF10: 3

## 2025-03-25 ASSESSMENT — ENCOUNTER SYMPTOMS
SINUS PRESSURE: 0
DIARRHEA: 0
SHORTNESS OF BREATH: 0
VOMITING: 0
SORE THROAT: 1
PHOTOPHOBIA: 0
COUGH: 1
ABDOMINAL PAIN: 0
WHEEZING: 0
CONSTIPATION: 0
SINUS PAIN: 0
NAUSEA: 0

## 2025-03-25 NOTE — DISCHARGE INSTRUCTIONS
Follow the supportive care measures as outlined in the discharge instruction.  I would recommend you keep your follow-up appointment on Thursday with your primary care physician if your symptoms persist.

## 2025-03-25 NOTE — ED PROVIDER NOTES
[facility]  EMERGENCY DEPARTMENT ENCOUNTER      Pt Name: Bharati Mendez  MRN: [id number]  Birthdate 1950  Date of evaluation: 3/25/2025  Provider: MILAGRO Ireland NP  11:34 AM    CHIEF COMPLAINT     No chief complaint on file.        HISTORY OF PRESENT ILLNESS    Bharati Mendez is a 74 y.o. female who presents to the emergency department     Patient had influenza approximately 1 month ago and recovered from that well.  Over the past 5 days she has been having sore throat which has been worsening in intensity.  She has difficulty swallowing.  Denies fevers or chills.  No shortness of breath.  Intermittent cough from pharyngeal irritation.  She is concerned she may have strep throat as she has had this in the past and the symptoms were identical.    The history is provided by the patient.       Nursing Notes were reviewed.    REVIEW OF SYSTEMS       Review of Systems   Constitutional:  Negative for activity change, chills, fatigue and fever.   HENT:  Positive for sore throat. Negative for sinus pressure and sinus pain.    Eyes:  Negative for photophobia and visual disturbance.   Respiratory:  Positive for cough. Negative for shortness of breath and wheezing.    Cardiovascular: Negative.  Negative for chest pain, palpitations and leg swelling.   Gastrointestinal:  Negative for abdominal pain, constipation, diarrhea, nausea and vomiting.   Endocrine: Negative for cold intolerance, heat intolerance and polyuria.   Genitourinary:  Negative for difficulty urinating and urgency.   Musculoskeletal:  Negative for arthralgias and myalgias.   Skin: Negative.  Negative for wound.   Neurological:  Negative for dizziness, syncope, weakness and light-headedness.   Hematological:  Negative for adenopathy. Does not bruise/bleed easily.   Psychiatric/Behavioral:  Negative for agitation and confusion. The patient is not nervous/anxious.        Except as noted above the remainder of the review of

## 2025-03-25 NOTE — ED PROVIDER NOTES
[facility]  eMERGENCY dEPARTMENT eNCOUnter   Attending Physician Attestation    Pt Name: Bharati Mendez  MRN: [id number]  Birthdate 1950  Date of evaluation: 3/25/25        I personally made/approves the management plan for this patient's and take responsibility for the patient management.      (Please note that portions of this note were completed with a voice recognition program.  Efforts were made to edit the dictations but occasionally words are mis-transcribed.)    fJ Cardenas DO  Attending Emergency  Physician                Jf Cardenas DO  03/25/25 113

## (undated) DEVICE — SOLUTION IRRIG 3000ML 0.9% SOD CHL USP UROMATIC PLAS CONT

## (undated) DEVICE — KIT,ANTI FOG,W/SPONGE & FLUID,SOFT PACK: Brand: MEDLINE

## (undated) DEVICE — SOCK SPEC L9IN WHT UNIV W/ STD PRT FOR FLD MGMT

## (undated) DEVICE — BLANKET WRM W40.2XL55.9IN IORT LO BODY + MISTRAL AIR

## (undated) DEVICE — ANES EXTENSION SET 90IN-LF: Brand: MEDLINE INDUSTRIES, INC.

## (undated) DEVICE — FORCEPS BX L240CM WRK CHN 2.8MM STD CAP W/ NDL MIC MESH

## (undated) DEVICE — SPONGE GZ W2XL2IN NONWOVEN 4 PLY FASTER WICKING ABIL AVANT

## (undated) DEVICE — CODMAN® SURGICAL PATTIES 1/2" X 3" (1.27CM X 7.62CM): Brand: CODMAN®

## (undated) DEVICE — SYRINGE MED 10ML TRNSLUC BRL PLUNG BLK MRK POLYPR CTRL

## (undated) DEVICE — INSTRUMENT TRACKER 9733533XOM ENT 1PK

## (undated) DEVICE — MINOR BSIN PK

## (undated) DEVICE — FIRM 8CM: Brand: NASOPORE

## (undated) DEVICE — WIPE INSTR W73XL73CM VISITEC

## (undated) DEVICE — TUBING, SUCTION, 1/4" X 12', STRAIGHT: Brand: MEDLINE

## (undated) DEVICE — GLOVE ORTHO 8   MSG9480

## (undated) DEVICE — TUBING 1895522 5PK STRAIGHTSHOT TO XPS: Brand: STRAIGHTSHOT®

## (undated) DEVICE — SET ADMIN 25ML L117IN PMP MOD CK VLV RLER CLMP 2 SMRTSITE

## (undated) DEVICE — POUCH INSTR W6.75XL11.5IN FRST 2 PKT ADH FOR ORTH AND

## (undated) DEVICE — MEDICINE CUP, GRADUATED, STER: Brand: MEDLINE

## (undated) DEVICE — FORCEP BX MESH TOOTH MIC 2.8 MMX240 CM NDL STRL RADIAL JAW 4

## (undated) DEVICE — SOLUTION IRRIGATION STRL H2O 1000 ML UROMATIC CONTAINER

## (undated) DEVICE — SHEET, ORTHO, SPLIT, STERILE: Brand: MEDLINE

## (undated) DEVICE — GLOVE SURG SZ 7 CRM LTX FREE POLYISOPRENE POLYMER BEAD ANTI

## (undated) DEVICE — PATIENT TRACKER 9734887XOM NON-INVASIVE